# Patient Record
Sex: MALE | Race: WHITE | Employment: OTHER | URBAN - METROPOLITAN AREA
[De-identification: names, ages, dates, MRNs, and addresses within clinical notes are randomized per-mention and may not be internally consistent; named-entity substitution may affect disease eponyms.]

---

## 2019-11-26 ENCOUNTER — OFFICE VISIT (OUTPATIENT)
Dept: ENT CLINIC | Age: 74
End: 2019-11-26
Payer: MEDICARE

## 2019-11-26 VITALS
BODY MASS INDEX: 30.64 KG/M2 | HEART RATE: 88 BPM | TEMPERATURE: 97.2 F | WEIGHT: 226.2 LBS | SYSTOLIC BLOOD PRESSURE: 128 MMHG | DIASTOLIC BLOOD PRESSURE: 77 MMHG | HEIGHT: 72 IN

## 2019-11-26 DIAGNOSIS — H90.3 SENSORINEURAL HEARING LOSS (SNHL) OF BOTH EARS: Primary | ICD-10-CM

## 2019-11-26 DIAGNOSIS — J30.2 SEASONAL ALLERGIES: ICD-10-CM

## 2019-11-26 PROCEDURE — 99203 OFFICE O/P NEW LOW 30 MIN: CPT | Performed by: OTOLARYNGOLOGY

## 2019-11-26 RX ORDER — ZOLPIDEM TARTRATE 12.5 MG/1
12.5 TABLET, FILM COATED, EXTENDED RELEASE ORAL
COMMUNITY

## 2019-11-26 RX ORDER — AZELASTINE HCL 205.5 UG/1
SPRAY NASAL
COMMUNITY
Start: 2019-11-14

## 2019-11-26 RX ORDER — LOSARTAN POTASSIUM AND HYDROCHLOROTHIAZIDE 25; 100 MG/1; MG/1
TABLET ORAL
COMMUNITY
Start: 2019-10-06

## 2019-11-26 RX ORDER — TRIAMCINOLONE ACETONIDE 55 UG/1
1 SPRAY, METERED NASAL
COMMUNITY

## 2019-11-26 RX ORDER — ROSUVASTATIN CALCIUM 10 MG/1
10 TABLET, COATED ORAL
COMMUNITY

## 2019-11-26 RX ORDER — OLOPATADINE HYDROCHLORIDE 1 MG/ML
1 SOLUTION/ DROPS OPHTHALMIC
COMMUNITY

## 2019-11-26 RX ORDER — AMLODIPINE BESYLATE 5 MG/1
TABLET ORAL
COMMUNITY
Start: 2019-11-24

## 2019-11-26 RX ORDER — VITAMIN E 268 MG
CAPSULE ORAL
COMMUNITY

## 2019-11-26 RX ORDER — MELOXICAM 15 MG/1
TABLET ORAL
COMMUNITY
Start: 2019-11-12

## 2019-11-26 ASSESSMENT — ENCOUNTER SYMPTOMS
SHORTNESS OF BREATH: 0
STRIDOR: 0
VOICE CHANGE: 0
COLOR CHANGE: 0
TROUBLE SWALLOWING: 0
CHOKING: 0
APNEA: 0
COUGH: 0
SORE THROAT: 0
EYE PAIN: 0
VOMITING: 0
FACIAL SWELLING: 0
DIARRHEA: 0
CHEST TIGHTNESS: 0
BACK PAIN: 0
EYE DISCHARGE: 0
SINUS PAIN: 0
RHINORRHEA: 0
NAUSEA: 0
SINUS PRESSURE: 0
BLOOD IN STOOL: 0
WHEEZING: 0
CONSTIPATION: 0

## 2022-06-10 ENCOUNTER — HOSPITAL ENCOUNTER (OUTPATIENT)
Dept: PHYSICAL THERAPY | Age: 77
Setting detail: THERAPIES SERIES
Discharge: HOME | End: 2022-06-10
Payer: MEDICARE

## 2022-06-10 PROCEDURE — 97163 PT EVAL HIGH COMPLEX 45 MIN: CPT

## 2022-06-10 NOTE — FLOWSHEET NOTE
[address]  Phone: [phone]   Fax:     [phone]    Physical Therapy Treatment Note/ Progress Report:     Date:  2022  6/10/2022    Patient Name:  Devan Mejia    :  1945  MRN: [id number]  Restrictions/Precautions:    Medical/Treatment Diagnosis Information:  Diagnosis: R shoulder pain, L shoulder pain, B RTC tear, low back pain, L knee pain  Treatment Diagnosis: tear RTC M75.100, R shoulder pain M25.511, L shoulder pain M25.512, lumbar pain M54.5, L knee pain M25.562, post laminectomy syndrome E87.9  Insurance/Certification information:  PT Insurance Information: [employer]  Physician Information:    Dr González Parker and Dr John Hughes of care signed (Y/N):     Date of Patient follow up with Physician:      Progress Report: [x]  Yes  []  No     Date Range for reporting period:  Beginnin/10/2022  Ending: -    Progress report due (10 Rx/or 30 days whichever is less):      Recertification due (POC duration/ or 90 days whichever is less):8/10/2022     Visit # Insurance Allowable Auth Needed   1 [employer] Medicare []Yes    []No     Pain level:  3-8/10   Functional Scale: Lumbar Spine (Foto 62 and RAJESH 15.6), Shoulder (Foto 52, Dash 38.3)   Date Assessed: 6/10/2022    SUBJECTIVE:  See eval    OBJECTIVE: See eval   Observation:    Test measurements:      RESTRICTIONS/PRECAUTIONS: n/a    Exercises/Interventions:     Therapeutic Ex (83514)   Min: sets/sec reps notes   Hip Flexor stretch in standing 30 4    Bridge       Kneeling Alt Arm-Leg      Side lying LB rot      Front Plank      Side planks       Kneeling hip abd/ext      1/2 kneeling down chop      Std band pull down      SL hip abd/clam      Lateral band pull      Lateral band walk      Bosu Lunge      Slide lunge       Ham string stretch      Hip Flex stretch      Glute Stretch      SLS Ball/wall glute      Manual Intervention ([id number]) Min:      DN      Prone PA      GISTM/STM      Lumbar Manip      SI Manip      Hip belt mobs      Hip LA distraction            NMR re-education (32730)   Min:      Mf Activation- re-ed      TrA Re-ed activation      Glute Max re-ed activation      Prone zandra Shane            Therapeutic Activity (88302) Min:                                Therapeutic Exercise and NMR EXR  [x] (09058) Provided verbal/tactile cueing for activities related to strengthening, flexibility, endurance, ROM  for improvements in proximal hip and core control with self care, mobility, lifting and ambulation. [x] (68008) Provided verbal/tactile cueing for activities related to improving balance, coordination, kinesthetic sense, posture, motor skill, proprioception  to assist with core control in self care, mobility, lifting, and ambulation.      Therapeutic Activities:    [] (53498 or 33946) Provided verbal/tactile cueing for activities related to improving balance, coordination, kinesthetic sense, posture, motor skill, proprioception and motor activation to allow for proper function  with self care and ADLs  [] (24559) Provided training and instruction to the patient for proper core and proximal hip recruitment and positioning with ambulation re-education     Home Exercise Program:    [x] (49317) Reviewed/Progressed HEP activities related to strengthening, flexibility, endurance, ROM of core, proximal hip and LE for functional self-care, mobility, lifting and ambulation   [] (83952) Reviewed/Progressed HEP activities related to improving balance, coordination, kinesthetic sense, posture, motor skill, proprioception of core, proximal hip and LE for self care, mobility, lifting, and ambulation      Manual Treatments:  PROM / STM / Oscillations-Mobs:  G-I, II, III, IV (PA's, Inf., Post.)  [x] (64438) Provided manual therapy to mobilize proximal hip and LS spine soft tissue/joints for the purpose of modulating pain, promoting relaxation,  increasing ROM, reducing/eliminating soft tissue swelling/inflammation/restriction, improving soft tissue extensibility and allowing for proper ROM for normal function with self care, mobility, lifting and ambulation. Modalities:       Charges:  Timed Code Treatment Minutes: 5   Total Treatment Minutes: 45     [] EVAL (LOW) 64846 (typically 20 minutes face-to-face)  [] EVAL (MOD) 40009 (typically 30 minutes face-to-face)  [x] EVAL (HIGH) 81136 (typically 45 minutes face-to-face)  [] RE-EVAL     [] AY(18117) x     [] DRY NEEDLE 1 OR 2 MUSCLES  [] NMR (08360) x     [] DRY NEEDLE 3+ MUSCLES  [] Manual (10507) x       [] TA (06262) x     [] Mech Traction (65895)  [] ES(attended) (04133)     [] ES (un) (47978):   [] VASO (65783)  [] Other:    If BWC Please Indicate Time In/Out  CPT Code Time in Time out                                     GOALS:  Patient stated goal: relieve shoulder and buttock/leg pain  [] Progressing: [] Met: [] Not Met: [] Adjusted  Therapist goals for Patient:   Short Term Goals: To be achieved in: 2 weeks  1. Independent in HEP and progression per patient tolerance, in order to prevent re-injury. [] Progressing: [] Met: [] Not Met: [] Adjusted  2. Patient will have a decrease in pain to facilitate improvement in movement, function, and ADLs as indicated by Functional Deficits. [] Progressing: [] Met: [] Not Met: [] Adjusted    Long Term Goals: To be achieved in: 8 weeks  1. Disability index score of 62 or better for Foto (shoulder) and 25 or better for Dash score (Foto). Disability index score of 10 or better for RAJESH (Foto). To assist with reaching prior level of function. [] Progressing: [] Met: [] Not Met: [] Adjusted  2. Patient will demonstrate increased AROM to at least 160 degrees forward elevation of shoulder and good LS mobility, good hip ROM to allow for proper joint functioning as indicated by patients Functional Deficits.    [] Progressing: [] Met: [] Not Met: [] Adjusted  3. Patient will demonstrate an increase in Strength of bilateral shoulder girdle and good proximal hip and core activation to allow for proper functional mobility as indicated by patients Functional Deficits. [] Progressing: [] Met: [] Not Met: [] Adjusted  4. Patient will return to lifting and reaching in functional planes with decreased painful arc of shoulder at  degrees without increased symptoms or restriction. [] Progressing: [] Met: [] Not Met: [] Adjusted  5. Patient will report decreased lateral thigh pain with sitting and standing for greater than 30-60 minutes. (patient specific functional goal)    [] Progressing: [] Met: [] Not Met: [] Adjusted    ASSESSMENT:  See eval    Treatment/Activity Tolerance:  [x] Patient tolerated treatment well [] Patient limited by fatique  [] Patient limited by pain  [] Patient limited by other medical complications  [] Other:     Overall Progression Towards Functional goals/ Treatment Progress Update:  [] Patient is progressing as expected towards functional goals listed. [] Progression is slowed due to complexities/Impairments listed. [] Progression has been slowed due to co-morbidities. [x] Plan just implemented, too soon to assess goals progression <30days   [] Goals require adjustment due to lack of progress  [] Patient is not progressing as expected and requires additional follow up with physician  [] Other:    Prognosis for POC: [x] Good [] Fair  [] Poor    Patient requires continued skilled intervention: [x] Yes  [] No        PLAN: See eval  [] Continue per plan of care [] Alter current plan (see comments)  [x] Plan of care initiated [] Hold pending MD visit [] Discharge    Electronically signed by: Vickey Salazar PT    Note: If patient does not return for scheduled/recommended follow up visits, this note will serve as a discharge from care along with the most recent update on progress.

## 2022-06-10 NOTE — PLAN OF CARE
Todd Coronel  Phone: [phone]   Fax:     [phone]                                                       Physical Therapy Certification    Dear Dr Pablo Sanford and Dr Martin Field  ,    We had the pleasure of evaluating the following patient for physical therapy services at [address] [place] [address]. A summary of our findings can be found in the initial assessment below. This includes our plan of care. If you have any questions or concerns regarding these findings, please do not hesitate to contact me at the office phone number checked above. Thank you for the referral.       Physician Signature:_______________________________Date:__________________  By signing above (or electronic signature), therapists plan is approved by physician            Patient: Osbaldo Johnson   : 1945   MRN: [id number]  Referring Physician:  Dr Pablo Sanford (shoulder order) and Dr Martin Field (lumbar spine order)      Evaluation Date: 2022      Medical Diagnosis Information:  Diagnosis: R shoulder pain, L shoulder pain, B RTC tear, low back pain, L knee pain   Treatment Diagnosis: tear RTC M75.100, R shoulder pain M25.511, L shoulder pain M25.512, lumbar pain M54.5, L knee pain M25.562, post laminectomy syndrome M96.1                                         Insurance information: PT Insurance Information: [employer] Medicare     Precautions/ Contra-indications: none  Latex Allergy:  [x]NO      []YES  Preferred Language for Healthcare:   [x]English       []other:    C-SSRS Triggered by Intake questionnaire (Past 2 wk assessment ):   [x] No, Questionnaire did not trigger screening.   [] Yes, Patient intake triggered C-SSRS Screening      [] C-SSRS Screening completed  [] PCP notified via Epic     SUBJECTIVE: Patient stated complaint:Patient reports that he has been having issues with his back/knee for some time, as well as his shoulders. States that he had discectomy in 1986, but a few years ago started having increased issues. Had B TKR in 2015, but notes that he continues to have IT band type discomfort on L leg. Was treated with PT in 2018 for this with no significant improvement. States that he also had HARPER injection in 2019 and bilateral HARPER in 2020; both without significant improvement in pain. Underwent a genicular block in 2020 as well and did note much improvement with this. Reports that most of his pain is around buttocks and into lateral thigh. States that it occurs mostly with sitting for prolonged periods, as well as with standing. Will be having an EMG. Patient additionally having bilateral shoulder pain which is limiting him with reaching and lifting OH. Pain has been intermittent over the last 8 years, but has been more prominent recently. Patient has had xray images, but no MRI of shoulder to date.      Relevant Medical History: B TKR 2015, discectomy 1986  Functional Scale/Score: Lumbar Spine (Foto 62 and RAJESH 15.6), Shoulder (Foto 52, Dash 38.3)    Pain Scale: 3-8/10  Easing factors: ice, heat, medication  Provocative factors: sitting, standing, sleeping, reaching, OH movements     12/2019 MRI lumbar spine: severe lumbar spondylosis with severe spinal stenosis at L3-4, severe L L3 and moderate right L2, moderate to severe right L4 and moderate right L5 foraminal stenosis    Type: [x]Constant   []Intermittent  []Radiating []Localized []other:     Numbness/Tingling: n/a    Occupation/School: retired    Living Status/Prior Level of Function: Independent with ADLs and IADLs, yard work, carpentry, walking    OBJECTIVE:   Repeated Movements:    ROM  Comments   Lumbar Flex 70 degrees Fingertips to ankle mortise   Lumbar Ext 15 degrees      ROM LEFT RIGHT Comments   Lumbar Side Bend 15 10    Lumbar Rotation limited limited    Quadrant negative negative    Hip Flexion 105 102    Hip Abd 45 45    Hip ER 50 55    Hip IR 10 10    Hip Extension WNL    Bandages/Dressings/Incisions: NA    Neurodynamics: WNL    Orthopedic Special Tests:    Neural dynamic tension testing Normal Abnormal Comments   Slump Test  - Degree of knee flexion:  [x] []    SLR  [x] []    0-30 [] []    30-70 [] []    Femoral nerve (L2-4) [x] []       Normal Abnormal N/A Comments   Fwd Bend-aberrant or innominate mvmt) [x] [] []    Trendelenburg [] [] []    Kemps/Quadrant [] [] []    Vineet Fey [] [] []    CANDIDA/Pieter [] [] []    Hip scour [] [] []    Supine to sit [] [] []    Prone knee bend [] [] []           Hip thrust [] [] []    SI distraction/compression [] [] []    Sacral Spring/thrust [] [] []               [x] Patient history, allergies, meds reviewed. Medical chart reviewed. See intake form. Review Of Systems (ROS):  [x]Performed Review of systems (Integumentary, CardioPulmonary, Neurological) by intake and observation. Intake form has been scanned into medical record. Patient has been instructed to contact their primary care physician regarding ROS issues if not already being addressed at this time.       Co-morbidities/Complexities (which will affect course of rehabilitation):   []None           Arthritic conditions   []Rheumatoid arthritis (M05.9)  [x]Osteoarthritis (M19.91)   Cardiovascular conditions   [x]Hypertension (I10)  []Hyperlipidemia (E78.5)  []Angina pectoris (I20)  []Atherosclerosis (I70)  []CVA Musculoskeletal conditions   []Disc pathology   []Congenital spine pathologies   [x]Prior surgical intervention  []Osteoporosis (M81.8)  []Osteopenia (M85.8)   Endocrine conditions   []Hypothyroid (E03.9)  []Hyperthyroid Gastrointestinal conditions   []Constipation (C16.70)   Metabolic conditions   []Morbid obesity (E66.01)  []Diabetes type 1(E10.65) or 2 (E11.65)   []Neuropathy (G60.9)     Pulmonary conditions   []Asthma (J45)  []Coughing   []COPD (J44.9)   Psychological Disorders  [x]Anxiety (F41.9)  []Depression (F32.9)   []Other:   [x]Other:   Hx of CA        Barriers to/and or personal factors that will affect rehab potential:              []Age  []Sex    []Smoker              []Motivation/Lack of Motivation                        []Co-Morbidities              []Cognitive Function, education/learning barriers              []Environmental, home barriers              []profession/work barriers  []past PT/medical experience  []other:  Justification:     Falls Risk Assessment (30 days):   [x] Falls Risk assessed and no intervention required. [] Falls Risk assessed and Patient requires intervention due to being higher risk   TUG score (>12s at risk):     [] Falls education provided, including:         ASSESSMENT: Patient is a 68 yo female who presents to therapy with reports of B shoulder pain as well as chronic low back pain with symptoms distally along lateral thigh. Upon assessment, patient with decreased lumbar ROM, decreased hip ROM, decreased hip musculature flexibility, decreased hip extensor strength, and decreased bilateral UE ROM and strength. Patient will benefit from skilled PT services to address noted deficits and work to further improve ROM in lumbar spine, hips and UE; as well as improve core stability, shoulder girdle stability and eccentric control of shoulder.      Functional Impairments:     [x]Noted lumbar/proximal hip hypomobility   []Noted lumbosacral and/or generalized hypermobility   [x]Decreased Lumbosacral/hip/LE functional ROM and shoulder ROM   [x]Decreased core/proximal hip strength and neuromuscular control    [x]Decreased LE/UE functional strength    []Abnormal reflexes/sensation/myotomal/dermatomal deficits  []Reduced balance/proprioceptive control    []other:      Functional Activity Limitations (from functional questionnaire and intake)   [x]Reduced ability to tolerate prolonged functional positions   [x]Reduced ability or difficulty with changes of positions or transfers between positions   []Reduced ability to maintain good posture and demonstrate good body mechanics with sitting, bending, and lifting   [x]Reduced ability to sleep   [] Reduced ability or tolerance with driving and/or computer work   [x]Reduced ability to perform lifting, reaching, carrying tasks   [x]Reduced ability to squat   []Reduced ability to forward bend   [x]Reduced ability to ambulate prolonged functional periods/distances/surfaces   []Reduced ability to ascend/descend stairs   [x]other:       Participation Restrictions   []Reduced participation in self care activities   [x]Reduced participation in home management activities   [x]Reduced participation in yard/barn work activities   []Reduced participation in social activities. []Reduced participation in sport/recreational activities. Classification:   [x]Signs/symptoms consistent with Lumbar instability/stabilization subgroup. []Signs/symptoms consistent with Lumbar mobilization/manipulation subgroup, myotomes and dermatomes intact. Meets manipulation criteria. []Signs/symptoms consistent with Lumbar direction specific/centralization subgroup   []Signs/symptoms consistent with Lumbar traction subgroup       []Signs/symptoms consistent with lumbar facet dysfunction   [x]Signs/symptoms consistent with lumbar stenosis type dysfunction   []Signs/symptoms consistent with nerve root involvement including myotome & dermatome dysfunction   []Signs/symptoms consistent with post-surgical status including: decreased ROM, strength and function.    [x]signs/symptoms consistent with pathology which may benefit from Dry needling     [x]other:  Signs/symptoms consistent with RTC tear    Prognosis/Rehab Potential:      []Excellent   [x]Good    []Fair   []Poor    Tolerance of evaluation/treatment:    []Excellent   [x]Good    []Fair   []Poor     Physical Therapy Evaluation Complexity Justification  [x] A history of present problem with:  [] no personal factors and/or comorbidities that impact the plan of care;  [x]1-2 personal factors and/or comorbidities that impact the plan of care  []3 personal factors and/or comorbidities that impact the plan of care  [x] An examination of body systems using standardized tests and measures addressing any of the following: body structures and functions (impairments), activity limitations, and/or participation restrictions;:  [] a total of 1-2 or more elements   [] a total of 3 or more elements   [x] a total of 4 or more elements   [x] A clinical presentation with:  [] stable and/or uncomplicated characteristics   [x] evolving clinical presentation with changing characteristics  [] unstable and unpredictable characteristics;   [x] Clinical decision making of [] low, [] moderate, [x] high complexity using standardized patient assessment instrument and/or measurable assessment of functional outcome. [x] EVAL (LOW) 08792 (typically 20 minutes face-to-face)  [] EVAL (MOD) 62308 (typically 30 minutes face-to-face)  [x] EVAL (HIGH) 39412 (typically 45 minutes face-to-face)  [] RE-EVAL     PLAN: Begin PT focusing on: proximal hip mobilizations, LB mobs, LB core activation, proximal hip activation, and HEP    Frequency/Duration:  2 days per week for 8 Weeks:  Interventions:  [x]  Therapeutic exercise including: strength training, ROM, for LE, Glutes and core   [x]  NMR activation and proprioception for glutes , LE and Core   [x]  Manual therapy as indicated for Hip complex, LE and spine to include: Dry Needling/IASTM, STM, PROM, Gr I-IV mobilizations, manipulation. [x]  Modalities as needed that may include: thermal agents, E-stim, Biofeedback, US, iontophoresis as indicated  [x]  Patient education on joint protection, postural re-education, activity modification, progression of HEP. HEP instruction: hip flexor stretch (see scanned forms)    GOALS:  Patient stated goal: relieve shoulder and buttock/leg pain  [] Progressing: [] Met: [] Not Met: [] Adjusted  Therapist goals for Patient:   Short Term Goals:  To be achieved in: 2 weeks  1. Independent in HEP and progression per patient tolerance, in order to prevent re-injury. [] Progressing: [] Met: [] Not Met: [] Adjusted  2. Patient will have a decrease in pain to facilitate improvement in movement, function, and ADLs as indicated by Functional Deficits. [] Progressing: [] Met: [] Not Met: [] Adjusted    Long Term Goals: To be achieved in: 8 weeks  1. Disability index score of 62 or better for Foto (shoulder) and 25 or better for Dash score (Foto). Disability index score of 10 or better for RAJESH (Foto). To assist with reaching prior level of function. [] Progressing: [] Met: [] Not Met: [] Adjusted  2. Patient will demonstrate increased AROM to at least 160 degrees forward elevation of shoulder and good LS mobility, good hip ROM to allow for proper joint functioning as indicated by patients Functional Deficits. [] Progressing: [] Met: [] Not Met: [] Adjusted  3. Patient will demonstrate an increase in Strength of bilateral shoulder girdle and good proximal hip and core activation to allow for proper functional mobility as indicated by patients Functional Deficits. [] Progressing: [] Met: [] Not Met: [] Adjusted  4. Patient will return to lifting and reaching in functional planes with decreased painful arc of shoulder at  degrees without increased symptoms or restriction. [] Progressing: [] Met: [] Not Met: [] Adjusted  5.  Patient will report decreased lateral thigh pain with sitting and standing for greater than 30-60 minutes. (patient specific functional goal)    [] Progressing: [] Met: [] Not Met: [] Adjusted     Electronically signed by:  Abdifatah Al PT

## 2022-06-14 ENCOUNTER — HOSPITAL ENCOUNTER (OUTPATIENT)
Dept: PHYSICAL THERAPY | Age: 77
Setting detail: THERAPIES SERIES
Discharge: HOME | End: 2022-06-14
Payer: MEDICARE

## 2022-06-14 PROCEDURE — 97110 THERAPEUTIC EXERCISES: CPT

## 2022-06-14 PROCEDURE — 97140 MANUAL THERAPY 1/> REGIONS: CPT

## 2022-06-14 NOTE — FLOWSHEET NOTE
[address]  Phone: [phone]   Fax:     [phone]    Physical Therapy Treatment Note/ Progress Report:     Date:  2022      Patient Name:  Osbaldo Johnson    :  1945  MRN: [id number]  Restrictions/Precautions:    Medical/Treatment Diagnosis Information:  Diagnosis: R shoulder pain, L shoulder pain, B RTC tear, low back pain, L knee pain  Treatment Diagnosis: tear RTC M75.100, R shoulder pain M25.511, L shoulder pain M25.512, lumbar pain M54.5, L knee pain M25.562, post laminectomy syndrome Z98.5  Insurance/Certification information:  PT Insurance Information: [facility] Medicare  Physician Information:    Dr Martin Field and Dr Ale Koenig of care signed (Y/N):     Date of Patient follow up with Physician:      Progress Report: [x]  Yes  []  No     Date Range for reporting period:  Beginnin/10/2022  Ending: -    Progress report due (10 Rx/or 30 days whichever is less): 3/02/8428     Recertification due (POC duration/ or 90 days whichever is less):8/10/2022     Visit # Insurance Allowable Auth Needed   2 [employer] []Yes    []No     Pain level:  3-8/10   Functional Scale: Lumbar Spine (Foto 62 and RAJESH 15.6), Shoulder (Foto 52, Dash 38.3)   Date Assessed: 6/10/2022    SUBJECTIVE:  Patient reports that he has been working on stretch provided. Would like to review it again today.      OBJECTIVE: See eval   Observation:    Test measurements:      RESTRICTIONS/PRECAUTIONS: n/a    Exercises/Interventions:     Therapeutic Ex (13559)   Min: 20 sets/sec reps notes   Hip Flexor stretch in standing 30 4    Glute max stretch (SKTC) 30 4 bilat   Piriformis stretch supine 30 4 bilat   Hip extension POT 10sec 10 bilat   Hip abd at wall 10sec 10 bilat   Bridge       Kneeling Alt Arm-Leg      Side lying LB rot      Front Plank      Side planks       Kneeling hip abd/ext      1/2 kneeling down chop      Std band pull down      SL hip abd/clam      Lateral band pull      Lateral band walk      Bosu Lunge      Slide lunge       Ham string stretch      Hip Flex stretch      Glute Stretch      SLS Ball/wall glute      Manual Intervention (74256) Min: 25 min      Prone L hip ER and anterior hip mob  8 min    Prone L hip ER MET 30 4    Prone R hip anterior hip mob  3 min    Hip belt mobs L hip  3 min    Prone PA lumbar mobs  8 min                      NMR re-education (30462)   Min:      Mf Activation- re-ed      TrA Re-ed activation      Glute Max re-ed activation      Prone zandra Shane            Therapeutic Activity (30828) Min:                                Therapeutic Exercise and NMR EXR  [x] (00131) Provided verbal/tactile cueing for activities related to strengthening, flexibility, endurance, ROM  for improvements in proximal hip and core control with self care, mobility, lifting and ambulation. [x] (29350) Provided verbal/tactile cueing for activities related to improving balance, coordination, kinesthetic sense, posture, motor skill, proprioception  to assist with core control in self care, mobility, lifting, and ambulation.      Therapeutic Activities:    [] (88046 or 60122) Provided verbal/tactile cueing for activities related to improving balance, coordination, kinesthetic sense, posture, motor skill, proprioception and motor activation to allow for proper function  with self care and ADLs  [] (62090) Provided training and instruction to the patient for proper core and proximal hip recruitment and positioning with ambulation re-education     Home Exercise Program:    [x] (45074) Reviewed/Progressed HEP activities related to strengthening, flexibility, endurance, ROM of core, proximal hip and LE for functional self-care, mobility, lifting and ambulation   [] (83928) Reviewed/Progressed HEP activities related to improving balance, coordination, kinesthetic sense, posture, motor skill, proprioception of core, proximal hip and LE for self care, mobility, lifting, and ambulation      Manual Treatments:  PROM / STM / Oscillations-Mobs:  G-I, II, III, IV (PA's, Inf., Post.)  [x] (84584) Provided manual therapy to mobilize proximal hip and LS spine soft tissue/joints for the purpose of modulating pain, promoting relaxation,  increasing ROM, reducing/eliminating soft tissue swelling/inflammation/restriction, improving soft tissue extensibility and allowing for proper ROM for normal function with self care, mobility, lifting and ambulation. Modalities:       Charges:  Timed Code Treatment Minutes: 45   Total Treatment Minutes: 45     [] EVAL (LOW) 02404 (typically 20 minutes face-to-face)  [] EVAL (MOD) 20561 (typically 30 minutes face-to-face)  [] EVAL (HIGH) 73885 (typically 45 minutes face-to-face)  [] RE-EVAL     [x] QN(59496) x  1   [] DRY NEEDLE 1 OR 2 MUSCLES  [] NMR (85759) x     [] DRY NEEDLE 3+ MUSCLES  [x] Manual (17604) x  2     [] TA (97448) x     [] Mech Traction (00454)  [] ES(attended) (82153)     [] ES (un) (38904):   [] VASO (57922)  [] Other:    If [facility] Please Indicate Time In/Out  CPT Code Time in Time out                                     GOALS:  Patient stated goal: relieve shoulder and buttock/leg pain  [] Progressing: [] Met: [] Not Met: [] Adjusted  Therapist goals for Patient:   Short Term Goals: To be achieved in: 2 weeks  1. Independent in HEP and progression per patient tolerance, in order to prevent re-injury. [] Progressing: [] Met: [] Not Met: [] Adjusted  2. Patient will have a decrease in pain to facilitate improvement in movement, function, and ADLs as indicated by Functional Deficits. [] Progressing: [] Met: [] Not Met: [] Adjusted    Long Term Goals: To be achieved in: 8 weeks  1. Disability index score of 62 or better for Foto (shoulder) and 25 or better for Dash score (Foto). Disability index score of 10 or better for RAJESH (Foto). To assist with reaching prior level of function. [] Progressing: [] Met: [] Not Met: [] Adjusted  2. Patient will demonstrate increased AROM to at least 160 degrees forward elevation of shoulder and good LS mobility, good hip ROM to allow for proper joint functioning as indicated by patients Functional Deficits. [] Progressing: [] Met: [] Not Met: [] Adjusted  3. Patient will demonstrate an increase in Strength of bilateral shoulder girdle and good proximal hip and core activation to allow for proper functional mobility as indicated by patients Functional Deficits. [] Progressing: [] Met: [] Not Met: [] Adjusted  4. Patient will return to lifting and reaching in functional planes with decreased painful arc of shoulder at  degrees without increased symptoms or restriction. [] Progressing: [] Met: [] Not Met: [] Adjusted  5. Patient will report decreased lateral thigh pain with sitting and standing for greater than 30-60 minutes. (patient specific functional goal)    [] Progressing: [] Met: [] Not Met: [] Adjusted    ASSESSMENT:  See eval    Treatment/Activity Tolerance:  [x] Patient tolerated treatment well [] Patient limited by fatique  [] Patient limited by pain  [] Patient limited by other medical complications  [] Other:     Overall Progression Towards Functional goals/ Treatment Progress Update:  [] Patient is progressing as expected towards functional goals listed. [] Progression is slowed due to complexities/Impairments listed. [] Progression has been slowed due to co-morbidities.   [x] Plan just implemented, too soon to assess goals progression <30days   [] Goals require adjustment due to lack of progress  [] Patient is not progressing as expected and requires additional follow up with physician  [] Other:    Prognosis for POC: [x] Good [] Fair  [] Poor    Patient requires continued skilled intervention: [x] Yes  [] No        PLAN: See eval  [] Continue per plan of care [] Alter current plan (see comments)  [x] Plan of care initiated [] Hold pending MD visit [] Discharge    Electronically signed by: Candice Darden PT    Note: If patient does not return for scheduled/recommended follow up visits, this note will serve as a discharge from care along with the most recent update on progress.

## 2022-06-16 ENCOUNTER — HOSPITAL ENCOUNTER (OUTPATIENT)
Dept: PHYSICAL THERAPY | Age: 77
Setting detail: THERAPIES SERIES
Discharge: HOME | End: 2022-06-16
Payer: MEDICARE

## 2022-06-16 PROCEDURE — 97110 THERAPEUTIC EXERCISES: CPT

## 2022-06-16 PROCEDURE — 97140 MANUAL THERAPY 1/> REGIONS: CPT

## 2022-06-16 NOTE — FLOWSHEET NOTE
[address]  Phone: [phone]   Fax:     [phone]    Physical Therapy Treatment Note/ Progress Report:     Date:  2022      Patient Name:  Jose Douglas    :  1945  MRN: [id number]  Restrictions/Precautions:    Medical/Treatment Diagnosis Information:  Diagnosis: R shoulder pain, L shoulder pain, B RTC tear, low back pain, L knee pain  Treatment Diagnosis: tear RTC M75.100, R shoulder pain M25.511, L shoulder pain M25.512, lumbar pain M54.5, L knee pain M25.562, post laminectomy syndrome V25.6  Insurance/Certification information:  PT Insurance Information: [employer]  Physician Information:    Dr Perkins and Dr Washington Thapa of care signed (Y/N):     Date of Patient follow up with Physician:      Progress Report: [x]  Yes  []  No     Date Range for reporting period:  Beginnin/10/2022  Ending: -    Progress report due (10 Rx/or 30 days whichever is less):      Recertification due (POC duration/ or 90 days whichever is less):8/10/2022     Visit # Insurance Allowable Auth Needed   3 [employer] []Yes    []No     Pain level:  3-8/10   Functional Scale: Lumbar Spine (Foto 62 and RAJESH 15.6), Shoulder (Foto 52, Dash 38.3)   Date Assessed: 6/10/2022    SUBJECTIVE:  Patient reports that he has been working on home stretch provided for legs. Ready to work on shoulders today.       OBJECTIVE: See eval   Observation:    Test measurements:      RESTRICTIONS/PRECAUTIONS: n/a    Exercises/Interventions:     Therapeutic Ex (64455)   Min: 25 sets/sec reps notes   Hip Flexor stretch in standing 30 4    Glute max stretch (SKTC) 30 4 bilat   Piriformis stretch supine 30 4 bilat   Hip extension POT 10sec 10 bilat   Hip abd at wall 10sec 10 bilat   Bridge       Kneeling Alt Arm-Leg      Side lying LB rot      Front Plank      Side planks       Kneeling hip abd/ext            Shoulder press/flex/ER 1 15 Brown wand   SA punch 1 15 2 lb, bilat   Standing band row 1 15 black   Standing band sh extension 1 15 black   Side stepping ER with band 1 15 Bilat, black                                 Manual Intervention (17671) Min: 20 min      Prone L hip ER and anterior hip mob  8 min    Prone L hip ER MET 30 4    Prone R hip anterior hip mob  3 min    Hip belt mobs L hip  3 min    Prone PA lumbar mobs  8 min          Shoulder posterior and inferior mobs  20 bilat                           NMR re-education (69756)   Min:      Mf Activation- re-ed      TrA Re-ed activation      Glute Max re-ed activation      Prone froggers      [place]            Therapeutic Activity (45564) Min:                                Therapeutic Exercise and NMR EXR  [x] (34263) Provided verbal/tactile cueing for activities related to strengthening, flexibility, endurance, ROM  for improvements in proximal hip and core control with self care, mobility, lifting and ambulation. [x] (61274) Provided verbal/tactile cueing for activities related to improving balance, coordination, kinesthetic sense, posture, motor skill, proprioception  to assist with core control in self care, mobility, lifting, and ambulation.      Therapeutic Activities:    [] (41763 or 91173) Provided verbal/tactile cueing for activities related to improving balance, coordination, kinesthetic sense, posture, motor skill, proprioception and motor activation to allow for proper function  with self care and ADLs  [] (36284) Provided training and instruction to the patient for proper core and proximal hip recruitment and positioning with ambulation re-education     Home Exercise Program:    [x] (10962) Reviewed/Progressed HEP activities related to strengthening, flexibility, endurance, ROM of core, proximal hip and LE for functional self-care, mobility, lifting and ambulation   [] (12792) Reviewed/Progressed HEP activities related to improving balance, coordination, kinesthetic sense, posture, motor skill, proprioception of core, proximal hip and LE for self care, mobility, lifting, and ambulation      Manual Treatments:  PROM / STM / Oscillations-Mobs:  G-I, II, III, IV (PA's, Inf., Post.)  [x] (56184) Provided manual therapy to mobilize proximal hip and LS spine soft tissue/joints for the purpose of modulating pain, promoting relaxation,  increasing ROM, reducing/eliminating soft tissue swelling/inflammation/restriction, improving soft tissue extensibility and allowing for proper ROM for normal function with self care, mobility, lifting and ambulation. Modalities:       Charges:  Timed Code Treatment Minutes: 45   Total Treatment Minutes: 45     [] EVAL (LOW) 02221 (typically 20 minutes face-to-face)  [] EVAL (MOD) 01793 (typically 30 minutes face-to-face)  [] EVAL (HIGH) 99930 (typically 45 minutes face-to-face)  [] RE-EVAL     [x] CC(42879) x  2   [] DRY NEEDLE 1 OR 2 MUSCLES  [] NMR (56478) x     [] DRY NEEDLE 3+ MUSCLES  [x] Manual (31511) x  1     [] TA (95129) x     [] Mech Traction (42921)  [] ES(attended) (73136)     [] ES (un) (58815):   [] VASO (57333)  [] Other:    If [facility] Please Indicate Time In/Out  CPT Code Time in Time out                                     GOALS:  Patient stated goal: relieve shoulder and buttock/leg pain  [] Progressing: [] Met: [] Not Met: [] Adjusted  Therapist goals for Patient:   Short Term Goals: To be achieved in: 2 weeks  1. Independent in HEP and progression per patient tolerance, in order to prevent re-injury. [] Progressing: [] Met: [] Not Met: [] Adjusted  2. Patient will have a decrease in pain to facilitate improvement in movement, function, and ADLs as indicated by Functional Deficits. [] Progressing: [] Met: [] Not Met: [] Adjusted    Long Term Goals: To be achieved in: 8 weeks  1. Disability index score of 62 or better for Foto (shoulder) and 25 or better for Dash score (Foto). Disability index score of 10 or better for RAJESH (Foto).  To assist with reaching prior level of function. [] Progressing: [] Met: [] Not Met: [] Adjusted  2. Patient will demonstrate increased AROM to at least 160 degrees forward elevation of shoulder and good LS mobility, good hip ROM to allow for proper joint functioning as indicated by patients Functional Deficits. [] Progressing: [] Met: [] Not Met: [] Adjusted  3. Patient will demonstrate an increase in Strength of bilateral shoulder girdle and good proximal hip and core activation to allow for proper functional mobility as indicated by patients Functional Deficits. [] Progressing: [] Met: [] Not Met: [] Adjusted  4. Patient will return to lifting and reaching in functional planes with decreased painful arc of shoulder at  degrees without increased symptoms or restriction. [] Progressing: [] Met: [] Not Met: [] Adjusted  5. Patient will report decreased lateral thigh pain with sitting and standing for greater than 30-60 minutes. (patient specific functional goal)    [] Progressing: [] Met: [] Not Met: [] Adjusted    ASSESSMENT:  Patient with restricted posterior and inferior mobility in bilat shoulder. Weak in periscapular musculature and RTC. Challenged with reactive ER sidestepping. Provided band for HEP. Treatment/Activity Tolerance:  [x] Patient tolerated treatment well [] Patient limited by fatique  [] Patient limited by pain  [] Patient limited by other medical complications  [] Other:     Overall Progression Towards Functional goals/ Treatment Progress Update:  [] Patient is progressing as expected towards functional goals listed. [] Progression is slowed due to complexities/Impairments listed. [] Progression has been slowed due to co-morbidities.   [x] Plan just implemented, too soon to assess goals progression <30days   [] Goals require adjustment due to lack of progress  [] Patient is not progressing as expected and requires additional follow up with physician  [] Other:    Prognosis for POC: [x] Good [] Fair  [] Poor    Patient requires continued skilled intervention: [x] Yes  [] No        PLAN: See eval  [] Continue per plan of care [] Alter current plan (see comments)  [x] Plan of care initiated [] Hold pending MD visit [] Discharge    Electronically signed by: Hector Morris PT    Note: If patient does not return for scheduled/recommended follow up visits, this note will serve as a discharge from care along with the most recent update on progress.

## 2022-06-21 ENCOUNTER — HOSPITAL ENCOUNTER (OUTPATIENT)
Dept: PHYSICAL THERAPY | Age: 77
Setting detail: THERAPIES SERIES
Discharge: HOME | End: 2022-06-21
Payer: MEDICARE

## 2022-06-21 PROCEDURE — 97110 THERAPEUTIC EXERCISES: CPT

## 2022-06-21 PROCEDURE — 97140 MANUAL THERAPY 1/> REGIONS: CPT

## 2022-06-21 NOTE — FLOWSHEET NOTE
[address]  Phone: [phone]   Fax:     [phone]    Physical Therapy Treatment Note/ Progress Report:     Date:  2022      Patient Name:  Enedelia Madera    :  1945  MRN: [id number]  Restrictions/Precautions:    Medical/Treatment Diagnosis Information:  Diagnosis: R shoulder pain, L shoulder pain, B RTC tear, low back pain, L knee pain  Treatment Diagnosis: tear RTC M75.100, R shoulder pain M25.511, L shoulder pain M25.512, lumbar pain M54.5, L knee pain M25.562, post laminectomy syndrome H61.0  Insurance/Certification information:  PT Insurance Information: [employer]  Physician Information:    Dr Jackie Blount and Dr Andres Limb of care signed (Y/N):     Date of Patient follow up with Physician:      Progress Report: [x]  Yes  []  No     Date Range for reporting period:  Beginnin/10/2022  Ending: -    Progress report due (10 Rx/or 30 days whichever is less):      Recertification due (POC duration/ or 90 days whichever is less):8/10/2022     Visit # Insurance Allowable Auth Needed   4 [employer] Medicare []Yes    []No     Pain level:  3-8/10   Functional Scale: Lumbar Spine (Foto 62 and RAJESH 15.6), Shoulder (Foto 52, Dash 38.3)   Date Assessed: 6/10/2022    SUBJECTIVE:  Patient reports that he has been having less N/T into lower leg (from knee to calf). Notes hips are feeling more loose. Still has times when he has pain along IT band area. Uses ball to roll area. A lot depends on what he has done throughout the day. Has a few questions regarding home exercises to ensure he is doing them properly.        OBJECTIVE: See eval   Observation:    Test measurements:      RESTRICTIONS/PRECAUTIONS: n/a    Exercises/Interventions:     Therapeutic Ex (53342)   Min: 10 sets/sec reps notes   Hip Flexor stretch in standing 30 4    Glute max stretch (SKTC) 30 4 bilat   Piriformis stretch supine 30 4 bilat Hip extension POT 10sec 10 bilat   Hip abd at wall 10sec 10 bilat   Bridge       Kneeling Alt Arm-Leg      Side lying LB rot 10 10    Review of HEP  5 min    Side planks       Kneeling hip abd/ext            Shoulder press/flex/ER 1 15 Brown wand   SA punch 1 15 2 lb, bilat   Standing band row 1 15 black   Standing band sh extension 1 15 black   Side stepping ER with band 1 15 Bilat, black                                 Manual Intervention (39273) Min: 30 min      Prone L hip ER and anterior hip mob  8 min    Prone L hip ER MET 0     Prone R hip anterior hip mob  0 min    Hip belt mobs L hip  4 min    Prone PA lumbar mobs  8 min    STM IT band + IT band mobs  10 min                      Shoulder posterior and inferior mobs  20 bilat                           NMR re-education (69058)   Min:      Mf Activation- re-ed      TrA Re-ed activation      Glute Max re-ed activation      Prone froggers      [place]            Therapeutic Activity (13377) Min:                                Therapeutic Exercise and NMR EXR  [x] (30096) Provided verbal/tactile cueing for activities related to strengthening, flexibility, endurance, ROM  for improvements in proximal hip and core control with self care, mobility, lifting and ambulation. [x] (30339) Provided verbal/tactile cueing for activities related to improving balance, coordination, kinesthetic sense, posture, motor skill, proprioception  to assist with core control in self care, mobility, lifting, and ambulation.      Therapeutic Activities:    [] (96010 or 70823) Provided verbal/tactile cueing for activities related to improving balance, coordination, kinesthetic sense, posture, motor skill, proprioception and motor activation to allow for proper function  with self care and ADLs  [] (39740) Provided training and instruction to the patient for proper core and proximal hip recruitment and positioning with ambulation re-education     Home Exercise Program:    [x] (33903) Reviewed/Progressed HEP activities related to strengthening, flexibility, endurance, ROM of core, proximal hip and LE for functional self-care, mobility, lifting and ambulation   [] (23898) Reviewed/Progressed HEP activities related to improving balance, coordination, kinesthetic sense, posture, motor skill, proprioception of core, proximal hip and LE for self care, mobility, lifting, and ambulation      Manual Treatments:  PROM / STM / Oscillations-Mobs:  G-I, II, III, IV (PA's, Inf., Post.)  [x] (93145) Provided manual therapy to mobilize proximal hip and LS spine soft tissue/joints for the purpose of modulating pain, promoting relaxation,  increasing ROM, reducing/eliminating soft tissue swelling/inflammation/restriction, improving soft tissue extensibility and allowing for proper ROM for normal function with self care, mobility, lifting and ambulation. Modalities:       Charges:  Timed Code Treatment Minutes: 40   Total Treatment Minutes: 41     [] EVAL (LOW) 53253 (typically 20 minutes face-to-face)  [] EVAL (MOD) 56198 (typically 30 minutes face-to-face)  [] EVAL (HIGH) 93558 (typically 45 minutes face-to-face)  [] RE-EVAL     [x] CI(40315) x  1   [] DRY NEEDLE 1 OR 2 MUSCLES  [] NMR (99476) x     [] DRY NEEDLE 3+ MUSCLES  [x] Manual (89417) x  2     [] TA (90930) x     [] Mech Traction (86987)  [] ES(attended) (20478)     [] ES (un) (01081):   [] VASO (92901)  [] Other:    If [facility] Please Indicate Time In/Out  CPT Code Time in Time out                                     GOALS:  Patient stated goal: relieve shoulder and buttock/leg pain  [] Progressing: [] Met: [] Not Met: [] Adjusted  Therapist goals for Patient:   Short Term Goals: To be achieved in: 2 weeks  1. Independent in HEP and progression per patient tolerance, in order to prevent re-injury. [] Progressing: [] Met: [] Not Met: [] Adjusted  2.  Patient will have a decrease in pain to facilitate improvement in movement, function, and ADLs as indicated by Functional Deficits. [] Progressing: [] Met: [] Not Met: [] Adjusted    Long Term Goals: To be achieved in: 8 weeks  1. Disability index score of 62 or better for Foto (shoulder) and 25 or better for Dash score (Foto). Disability index score of 10 or better for RJAESH (Foto). To assist with reaching prior level of function. [] Progressing: [] Met: [] Not Met: [] Adjusted  2. Patient will demonstrate increased AROM to at least 160 degrees forward elevation of shoulder and good LS mobility, good hip ROM to allow for proper joint functioning as indicated by patients Functional Deficits. [] Progressing: [] Met: [] Not Met: [] Adjusted  3. Patient will demonstrate an increase in Strength of bilateral shoulder girdle and good proximal hip and core activation to allow for proper functional mobility as indicated by patients Functional Deficits. [] Progressing: [] Met: [] Not Met: [] Adjusted  4. Patient will return to lifting and reaching in functional planes with decreased painful arc of shoulder at  degrees without increased symptoms or restriction. [] Progressing: [] Met: [] Not Met: [] Adjusted  5. Patient will report decreased lateral thigh pain with sitting and standing for greater than 30-60 minutes. (patient specific functional goal)    [] Progressing: [] Met: [] Not Met: [] Adjusted    ASSESSMENT:  Patient with improving hip ER bilaterally. L hip still restricted in IR. Good improvement in hip mobility with mobilization of hip. Patient tight in L L5/S!, good improvement of joint mobility with PA and sidelying opening of l/spine. Added self stretch for lumbar rotation to HEP. Patient tolerated all stretching well. IT band restricted along mid to distal portion, as well as at attachment to vastus lateralis and lateral hamstring. IT band mobilization to assist in flexibility and extensibility of tissue. Patient fibular head WNL. Updated HEP. Discussed prior HEP adjustments and modifications. Treatment/Activity Tolerance:  [x] Patient tolerated treatment well [] Patient limited by fatique  [] Patient limited by pain  [] Patient limited by other medical complications  [] Other:     Overall Progression Towards Functional goals/ Treatment Progress Update:  [] Patient is progressing as expected towards functional goals listed. [] Progression is slowed due to complexities/Impairments listed. [] Progression has been slowed due to co-morbidities. [x] Plan just implemented, too soon to assess goals progression <30days   [] Goals require adjustment due to lack of progress  [] Patient is not progressing as expected and requires additional follow up with physician  [] Other:    Prognosis for POC: [x] Good [] Fair  [] Poor    Patient requires continued skilled intervention: [x] Yes  [] No        PLAN: See eval  [] Continue per plan of care [] Alter current plan (see comments)  [x] Plan of care initiated [] Hold pending MD visit [] Discharge    Electronically signed by: Kera Flores, PT    Note: If patient does not return for scheduled/recommended follow up visits, this note will serve as a discharge from care along with the most recent update on progress.

## 2022-06-23 ENCOUNTER — HOSPITAL ENCOUNTER (OUTPATIENT)
Dept: PHYSICAL THERAPY | Age: 77
Setting detail: THERAPIES SERIES
Discharge: HOME | End: 2022-06-23
Payer: MEDICARE

## 2022-06-23 PROCEDURE — 97140 MANUAL THERAPY 1/> REGIONS: CPT

## 2022-06-23 PROCEDURE — 97110 THERAPEUTIC EXERCISES: CPT

## 2022-06-23 NOTE — FLOWSHEET NOTE
[address]  Phone: [phone]   Fax:     [phone]    Physical Therapy Treatment Note/ Progress Report:     Date:  2022      Patient Name:  Dutch Claros    :  1945  MRN: [id number]  Restrictions/Precautions:    Medical/Treatment Diagnosis Information:  Diagnosis: R shoulder pain, L shoulder pain, B RTC tear, low back pain, L knee pain  Treatment Diagnosis: tear RTC M75.100, R shoulder pain M25.511, L shoulder pain M25.512, lumbar pain M54.5, L knee pain M25.562, post laminectomy syndrome P29.3  Insurance/Certification information:  PT Insurance Information: [employer]  Physician Information:    Dr Josh Connor and Dr Suly Cruz of care signed (Y/N):     Date of Patient follow up with Physician:      Progress Report: [x]  Yes  []  No     Date Range for reporting period:  Beginnin/10/2022  Ending: -    Progress report due (10 Rx/or 30 days whichever is less):      Recertification due (POC duration/ or 90 days whichever is less):8/10/2022     Visit # Insurance Allowable Auth Needed   5 [employer] Medicare []Yes    []No     Pain level:  3-8/10   Functional Scale: Lumbar Spine (Foto 62 and RAJESH 15.6), Shoulder (Foto 52, Dash 38.3)   Date Assessed: 6/10/2022    SUBJECTIVE:  Patient reports that he has been working on home exercises for shoulders. States that he is doing better with form for exercises and is more aware of improving position. Tender in bicep areas. .       OBJECTIVE: tender to palpate in bilateral bicep and bilat posterior shoulders.    Observation:    Test measurements:      RESTRICTIONS/PRECAUTIONS: n/a    Exercises/Interventions:     Therapeutic Ex (33725)   Min: 25 sets/sec reps notes   Hip Flexor stretch in standing 30 4    Glute max stretch (SKTC) 30 4 bilat   Piriformis stretch supine 30 4 bilat   Hip extension POT 10sec 10 bilat   Hip abd at wall 10sec 10 bilat   Bridge Kneeling Alt Arm-Leg      Side lying LB rot 10 10    Review of HEP  5 min    Side planks       Kneeling hip abd/ext            Shoulder press/flex/ER 1 15 Brown wand   SA punch 1 15 2 lb, bilat   Standing band row 1 15 black   Standing band sh extension 1 15 black   Side stepping ER with band 1 15 Bilat, black   Side stepping IR with band with eccentric control to ER 1 15 red   GH depression 10 10 [place] ball   Shoulder ER isometrics at wall 10 10                Manual Intervention (37593) Min: 20 min      Prone L hip ER and anterior hip mob  0    Prone L hip ER MET 0     Prone R hip anterior hip mob  0 min    Hip belt mobs L hip  0 min    Prone PA lumbar mobs  0 min    STM IT band + IT band mobs  0 min                      Shoulder posterior and inferior mobs, STM posterior shoulder and biceps  20 bilat                           NMR re-education (88023)   Min:      Mf Activation- re-ed      TrA Re-ed activation      Glute Max re-ed activation      Prone zandra [place]al[place]            Therapeutic Activity (40015) Min:                                Therapeutic Exercise and NMR EXR  [x] (54149) Provided verbal/tactile cueing for activities related to strengthening, flexibility, endurance, ROM  for improvements in proximal hip and core control with self care, mobility, lifting and ambulation. [x] (13831) Provided verbal/tactile cueing for activities related to improving balance, coordination, kinesthetic sense, posture, motor skill, proprioception  to assist with core control in self care, mobility, lifting, and ambulation.      Therapeutic Activities:    [] (09754 or 63287) Provided verbal/tactile cueing for activities related to improving balance, coordination, kinesthetic sense, posture, motor skill, proprioception and motor activation to allow for proper function  with self care and ADLs  [] (62938) Provided training and instruction to the patient for proper core and proximal hip recruitment and positioning with ambulation re-education     Home Exercise Program:    [x] (83443) Reviewed/Progressed HEP activities related to strengthening, flexibility, endurance, ROM of core, proximal hip and LE for functional self-care, mobility, lifting and ambulation   [] (48592) Reviewed/Progressed HEP activities related to improving balance, coordination, kinesthetic sense, posture, motor skill, proprioception of core, proximal hip and LE for self care, mobility, lifting, and ambulation      Manual Treatments:  PROM / STM / Oscillations-Mobs:  G-I, II, III, IV (PA's, Inf., Post.)  [x] (15585) Provided manual therapy to mobilize proximal hip and LS spine soft tissue/joints for the purpose of modulating pain, promoting relaxation,  increasing ROM, reducing/eliminating soft tissue swelling/inflammation/restriction, improving soft tissue extensibility and allowing for proper ROM for normal function with self care, mobility, lifting and ambulation. Modalities:       Charges:  Timed Code Treatment Minutes: 45   Total Treatment Minutes: 45     [] EVAL (LOW) 18732 (typically 20 minutes face-to-face)  [] EVAL (MOD) 16292 (typically 30 minutes face-to-face)  [] EVAL (HIGH) 22351 (typically 45 minutes face-to-face)  [] RE-EVAL     [x] YZ(18392) x  2   [] DRY NEEDLE 1 OR 2 MUSCLES  [] NMR (67671) x     [] DRY NEEDLE 3+ MUSCLES  [x] Manual (01327) x  1     [] TA (49759) x     [] Mech Traction (77289)  [] ES(attended) (03919)     [] ES (un) (36979):   [] VASO (58939)  [] Other:    If [facility] Please Indicate Time In/Out  CPT Code Time in Time out                                     GOALS:  Patient stated goal: relieve shoulder and buttock/leg pain  [] Progressing: [] Met: [] Not Met: [] Adjusted  Therapist goals for Patient:   Short Term Goals: To be achieved in: 2 weeks  1. Independent in HEP and progression per patient tolerance, in order to prevent re-injury. [] Progressing: [] Met: [] Not Met: [] Adjusted  2.  Patient will have a decrease in pain to facilitate improvement in movement, function, and ADLs as indicated by Functional Deficits. [] Progressing: [] Met: [] Not Met: [] Adjusted    Long Term Goals: To be achieved in: 8 weeks  1. Disability index score of 62 or better for Foto (shoulder) and 25 or better for Dash score (Foto). Disability index score of 10 or better for RAJESH (Foto). To assist with reaching prior level of function. [] Progressing: [] Met: [] Not Met: [] Adjusted  2. Patient will demonstrate increased AROM to at least 160 degrees forward elevation of shoulder and good LS mobility, good hip ROM to allow for proper joint functioning as indicated by patients Functional Deficits. [] Progressing: [] Met: [] Not Met: [] Adjusted  3. Patient will demonstrate an increase in Strength of bilateral shoulder girdle and good proximal hip and core activation to allow for proper functional mobility as indicated by patients Functional Deficits. [] Progressing: [] Met: [] Not Met: [] Adjusted  4. Patient will return to lifting and reaching in functional planes with decreased painful arc of shoulder at  degrees without increased symptoms or restriction. [] Progressing: [] Met: [] Not Met: [] Adjusted  5. Patient will report decreased lateral thigh pain with sitting and standing for greater than 30-60 minutes. (patient specific functional goal)    [] Progressing: [] Met: [] Not Met: [] Adjusted    ASSESSMENT:  Patient with increased soft tissue restriction in bicep tendon bilaterally. Also tender in posterior shoulder. Less crepitus with improved scapular depression. Added further eccentric and isometrics for RTC. Patient tolerated scapular depression exercises well with good control. Needs further improvement with control during depression and elevation of shoulder.      Treatment/Activity Tolerance:  [x] Patient tolerated treatment well [] Patient limited by fatique  [] Patient limited by pain  [] Patient limited by other medical complications  [] Other:     Overall Progression Towards Functional goals/ Treatment Progress Update:  [] Patient is progressing as expected towards functional goals listed. [] Progression is slowed due to complexities/Impairments listed. [] Progression has been slowed due to co-morbidities. [x] Plan just implemented, too soon to assess goals progression <30days   [] Goals require adjustment due to lack of progress  [] Patient is not progressing as expected and requires additional follow up with physician  [] Other:    Prognosis for POC: [x] Good [] Fair  [] Poor    Patient requires continued skilled intervention: [x] Yes  [] No        PLAN: See eval  [] Continue per plan of care [] Alter current plan (see comments)  [x] Plan of care initiated [] Hold pending MD visit [] Discharge    Electronically signed by: Woodrow Fabian PT    Note: If patient does not return for scheduled/recommended follow up visits, this note will serve as a discharge from care along with the most recent update on progress.

## 2022-06-28 ENCOUNTER — HOSPITAL ENCOUNTER (OUTPATIENT)
Dept: PHYSICAL THERAPY | Age: 77
Setting detail: THERAPIES SERIES
Discharge: HOME | End: 2022-06-28
Payer: MEDICARE

## 2022-06-28 PROCEDURE — 97110 THERAPEUTIC EXERCISES: CPT

## 2022-06-28 PROCEDURE — 97140 MANUAL THERAPY 1/> REGIONS: CPT

## 2022-06-30 ENCOUNTER — HOSPITAL ENCOUNTER (OUTPATIENT)
Dept: PHYSICAL THERAPY | Age: 77
Setting detail: THERAPIES SERIES
Discharge: HOME | End: 2022-06-30
Payer: MEDICARE

## 2022-06-30 PROCEDURE — 97110 THERAPEUTIC EXERCISES: CPT

## 2022-06-30 PROCEDURE — 97140 MANUAL THERAPY 1/> REGIONS: CPT

## 2022-06-30 NOTE — FLOWSHEET NOTE
bilat   Bridge  2 10    Kneeling Alt Arm-Leg- EOT 2 10    Side lying LB rot 10 10 bilat   Review of HEP  5 min    Hooklying TA + alt march to 90/90 1 10    Kneeling hip abd/ext            Prone sh ext with scap retraction 1 10 bilat   Prone row with scap retraction 1 10 bilat   Prone scap retraction with lift 1 10 bilat   SL punch 1 10 bilat   SL ER 1 10 bilat   Shoulder press/flex/ER 1 15 Brown wand   SA punch 1 15 2 lb, bilat   Standing band row 1 15 black   Standing band sh extension 1 15 black   Side stepping ER with band 1 15 Bilat, black   Side stepping IR with band with eccentric control to ER 1 15 red   GH depression 10 10 [place] ball   Shoulder ER isometrics at wall 10 10                Manual Intervention (35732) Min: 20 min      Prone hip ER and anterior hip mob  4    Prone L hip ER MET 0     Prone R hip anterior hip mob  4 min    Hip belt mobs L hip  4 min    Prone PA lumbar mobs  8 min    STM IT band + IT band mobs  0 min                      Shoulder posterior and inferior mobs, STM posterior shoulder and biceps  20 bilat                           NMR re-education (52112)   Min:      Mf Activation- re-ed      TrA Re-ed activation      Glute Max re-ed activation      Prone froggers      [place]            Therapeutic Activity (50728) Min:                                Therapeutic Exercise and NMR EXR  [x] (38440) Provided verbal/tactile cueing for activities related to strengthening, flexibility, endurance, ROM  for improvements in proximal hip and core control with self care, mobility, lifting and ambulation. [x] (33886) Provided verbal/tactile cueing for activities related to improving balance, coordination, kinesthetic sense, posture, motor skill, proprioception  to assist with core control in self care, mobility, lifting, and ambulation.      Therapeutic Activities:    [] (39868 or 64642) Provided verbal/tactile cueing for activities related to improving balance, coordination, kinesthetic sense, [place]lin  [] Patient limited by pain  [] Patient limited by other medical complications  [] Other:     Overall Progression Towards Functional goals/ Treatment Progress Update:  [] Patient is progressing as expected towards functional goals listed. [] Progression is slowed due to complexities/Impairments listed. [] Progression has been slowed due to co-morbidities. [x] Plan just implemented, too soon to assess goals progression <30days   [] Goals require adjustment due to lack of progress  [] Patient is not progressing as expected and requires additional follow up with physician  [] Other:    Prognosis for POC: [x] Good [] Fair  [] Poor    Patient requires continued skilled intervention: [x] Yes  [] No        PLAN: See eval  [] Continue per plan of care [] Alter current plan (see comments)  [x] Plan of care initiated [] Hold pending MD visit [] Discharge    Electronically signed by: Bimal Lei PT    Note: If patient does not return for scheduled/recommended follow up visits, this note will serve as a discharge from care along with the most recent update on progress.

## 2022-07-05 ENCOUNTER — APPOINTMENT (OUTPATIENT)
Dept: PHYSICAL THERAPY | Age: 77
End: 2022-07-05
Payer: MEDICARE

## 2022-07-19 ENCOUNTER — HOSPITAL ENCOUNTER (OUTPATIENT)
Dept: PHYSICAL THERAPY | Age: 77
Setting detail: THERAPIES SERIES
Discharge: HOME | End: 2022-07-19
Payer: MEDICARE

## 2022-07-19 PROCEDURE — 97140 MANUAL THERAPY 1/> REGIONS: CPT

## 2022-07-19 PROCEDURE — 97110 THERAPEUTIC EXERCISES: CPT

## 2022-07-19 NOTE — FLOWSHEET NOTE
[address]  Phone: [phone]   Fax:     [phone]    Physical Therapy Treatment Note/ Progress Report:     Date:  2022      Patient Name:  Theron Blakely    :  1945  MRN: [id number]  Restrictions/Precautions:    Medical/Treatment Diagnosis Information:  Diagnosis: R shoulder pain, L shoulder pain, B RTC tear, low back pain, L knee pain  Treatment Diagnosis: tear RTC M75.100, R shoulder pain M25.511, L shoulder pain M25.512, lumbar pain M54.5, L knee pain M25.562, post laminectomy syndrome R86.6  Insurance/Certification information:  PT Insurance Information: [address] [place] [address]  Medicare  Physician Information:    Dr Marcela Barger and Dr Lloyd Cruz of care signed (Y/N):     Date of Patient follow up with Physician:      Progress Report: [x]  Yes  []  No     Date Range for reporting period:  Beginnin/10/2022  Ending: -    Progress report due (10 Rx/or 30 days whichever is less): 5791     Recertification due (POC duration/ or 90 days whichever is less):8/10/2022     Visit # Insurance Allowable Auth Needed   8 [employer] Medicare []Yes    []No     Pain level:  3-8/10   Functional Scale: Lumbar Spine (Foto 62 and RAJESH 15.6), Shoulder (Foto 52, Dash 38.3)   Date Assessed: 6/10/2022    SUBJECTIVE:  Patient reports that he 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 Feeling more discomfort in L hip and noted that he even felt some in posterior hip after this. Also had a hard time with flying and having lay overs, with sitting for prolonged periods. OBJECTIVE: tender to palpate in bilateral bicep and bilat posterior shoulders.   Observation:   Test measurements:      RESTRICTIONS/PRECAUTIONS: n/a    Exercises/Interventions:     Therapeutic Ex (90139)   Min: 25 sets/sec reps notes   Hip Flexor stretch in standing 0     Glute max stretch (SKTC) 0  bilat   Piriformis stretch supine 30 4 bilat   Hip extension POT 10sec 10 bilat   Hip abd at wall 10sec 10 bilat   Bridge  2 10    Kneeling Alt Arm-Leg- EOT 2 10    Side lying LB rot HEP  bilat   Review of HEP 0     Hooklying TA + alt march to 90/90 2 10    Multifidus chop 1 10 black         Prone sh ext with scap retraction 1 10 bilat   Prone row with scap retraction 1 10 bilat   Prone scap retraction with lift 1 10 bilat   SL punch 1 10 bilat   SL ER 1 10 bilat   Shoulder press/flex/ER 1 15 Brown wand   SA punch 1 15 2 lb, bilat   Standing band row 1 15 black   Standing band sh extension 1 15 black   Side stepping ER with band 1 15 Bilat, black   Side stepping IR with band with eccentric control to ER 1 15 red   GH depression 10 10 [place] ball   Shoulder ER isometrics at wall 10 10                Manual Intervention (29188) Min: 16 min      Prone hip ER and anterior hip mob  4    Prone L hip ER MET 0     Prone L hip anterior hip mob  0 min    Hip belt mobs L hip  4 min    Prone PA lumbar mobs  8 min    STM IT band + IT band mobs  0 min                      Shoulder posterior and inferior mobs, STM posterior shoulder and biceps  20 bilat                           NMR re-education (06777)   Min:      Mf Activation- re-ed      TrA Re-ed activation      Glute Max re-ed activation      Prone froggers      [place]            Therapeutic Activity (34566) Min:                                Therapeutic Exercise and NMR EXR  [x] (02950) Provided verbal/tactile cueing for activities related to strengthening, flexibility, endurance, ROM  for improvements in proximal hip and core control with self care, mobility, lifting and ambulation. [x] (86241) Provided verbal/tactile cueing for activities related to improving balance, coordination, kinesthetic sense, posture, motor skill, proprioception  to assist with core control in self care, mobility, lifting, and ambulation.      Therapeutic Activities:    [] (12517 or ) Provided verbal/tactile cueing for activities related to improving balance, coordination, kinesthetic sense, posture, motor skill, proprioception and motor activation to allow for proper function  with self care and ADLs  [] (96409) Provided training and instruction to the patient for proper core and proximal hip recruitment and positioning with ambulation re-education     Home Exercise Program:    [x] (64746) Reviewed/Progressed HEP activities related to strengthening, flexibility, endurance, ROM of core, proximal hip and LE for functional self-care, mobility, lifting and ambulation   [] (39600) Reviewed/Progressed HEP activities related to improving balance, coordination, kinesthetic sense, posture, motor skill, proprioception of core, proximal hip and LE for self care, mobility, lifting, and ambulation      Manual Treatments:  PROM / STM / Oscillations-Mobs:  G-I, II, III, IV (PA's, Inf., Post.)  [x] (35442) Provided manual therapy to mobilize proximal hip and LS spine soft tissue/joints for the purpose of modulating pain, promoting relaxation,  increasing ROM, reducing/eliminating soft tissue swelling/inflammation/restriction, improving soft tissue extensibility and allowing for proper ROM for normal function with self care, mobility, lifting and ambulation.      Modalities:       Charges:  Timed Code Treatment Minutes: 41   Total Treatment Minutes: 42     [] EVAL (LOW) 68513 (typically 20 minutes face-to-face)  [] EVAL (MOD) 82943 (typically 30 minutes face-to-face)  [] EVAL (HIGH) 54620 (typically 45 minutes face-to-face)  [] RE-EVAL     [x] LI(24970) x  2   [] DRY NEEDLE 1 OR 2 MUSCLES  [] NMR (70810) x     [] DRY NEEDLE 3+ MUSCLES  [x] Manual (18350) x  1     [] TA (33614) x     [] Mech Traction (67119)  [] ES(attended) (61355)     [] ES (un) (90300):   [] VASO (13206)  [] Other:    If BW Please Indicate Time In/Out  CPT Code Time in Time out                                     GOALS:  Patient stated goal: relieve shoulder and buttock/leg pain  [] Progressing: [] Met: [] Not Met: [] Adjusted  Therapist goals for Patient:   Short Term Goals: To be achieved in: 2 weeks  1. Independent in HEP and progression per patient tolerance, in order to prevent re-injury. [] Progressing: [] Met: [] Not Met: [] Adjusted  2. Patient will have a decrease in pain to facilitate improvement in movement, function, and ADLs as indicated by Functional Deficits. [] Progressing: [] Met: [] Not Met: [] Adjusted    Long Term Goals: To be achieved in: 8 weeks  1. Disability index score of 62 or better for Foto (shoulder) and 25 or better for Dash score (Foto). Disability index score of 10 or better for RAJESH (Foto). To assist with reaching prior level of function. [] Progressing: [] Met: [] Not Met: [] Adjusted  2. Patient will demonstrate increased AROM to at least 160 degrees forward elevation of shoulder and good LS mobility, good hip ROM to allow for proper joint functioning as indicated by patients Functional Deficits. [] Progressing: [] Met: [] Not Met: [] Adjusted  3. Patient will demonstrate an increase in Strength of bilateral shoulder girdle and good proximal hip and core activation to allow for proper functional mobility as indicated by patients Functional Deficits. [] Progressing: [] Met: [] Not Met: [] Adjusted  4. Patient will return to lifting and reaching in functional planes with decreased painful arc of shoulder at  degrees without increased symptoms or restriction. [] Progressing: [] Met: [] Not Met: [] Adjusted  5. Patient will report decreased lateral thigh pain with sitting and standing for greater than 30-60 minutes. (patient specific functional goal)    [] Progressing: [] Met: [] Not Met: [] Adjusted    ASSESSMENT:  Patient with improving hip ROM bilaterally, L hip more tight today compared to last session, but R doing well especially with ER. Patient program for core progressed further.  Continues to have difficulty with co-contraction of core and proximal hip. Needs further progression of glute max and med. Will do update for shoulders and back at next visit. Treatment/Activity Tolerance:  [x] Patient tolerated treatment well [] Patient limited by fatique  [] Patient limited by pain  [] Patient limited by other medical complications  [] Other:     Overall Progression Towards Functional goals/ Treatment Progress Update:  [] Patient is progressing as expected towards functional goals listed. [] Progression is slowed due to complexities/Impairments listed. [] Progression has been slowed due to co-morbidities. [x] Plan just implemented, too soon to assess goals progression <30days   [] Goals require adjustment due to lack of progress  [] Patient is not progressing as expected and requires additional follow up with physician  [] Other:    Prognosis for POC: [x] Good [] Fair  [] Poor    Patient requires continued skilled intervention: [x] Yes  [] No        PLAN: See eval  [] Continue per plan of care [] Alter current plan (see comments)  [x] Plan of care initiated [] Hold pending MD visit [] Discharge    Electronically signed by: Bimal Lei PT    Note: If patient does not return for scheduled/recommended follow up visits, this note will serve as a discharge from care along with the most recent update on progress.

## 2022-07-21 ENCOUNTER — HOSPITAL ENCOUNTER (OUTPATIENT)
Dept: PHYSICAL THERAPY | Age: 77
Setting detail: THERAPIES SERIES
Discharge: HOME | End: 2022-07-21
Payer: MEDICARE

## 2022-07-21 ENCOUNTER — APPOINTMENT (OUTPATIENT)
Dept: PHYSICAL THERAPY | Age: 77
End: 2022-07-21
Payer: MEDICARE

## 2022-07-21 PROCEDURE — 97140 MANUAL THERAPY 1/> REGIONS: CPT

## 2022-07-21 NOTE — PLAN OF CARE
Todd [facility]onel  Phone: [phone]   Fax:     [phone]        Physical Therapy Re-Certification Plan of Care/MD UPDATE      Dear Dr Robi Snow and Dr Chandrakant Mendiola ,    We had the pleasure of treating the following patient for physical therapy services at [address] [place] [address]. A summary of our findings can be found in the updated assessment below. This includes our plan of care. If you have any questions or concerns regarding these findings, please do not hesitate to contact me at the office phone number checked above.   Thank you for the referral.     Physician Signature:________________________________Date:__________________  By signing above (or electronic signature), therapists plan is approved by physician    Date Range Of Visits: 6/10/2022 thru 2022  Total Visits to Date: 9  Overall Response to Treatment:   [x]Patient is responding well to treatment and improvement is noted with regards  to goals   []Patient should continue to improve in reasonable time if they continue HEP   []Patient has plateaued and is no longer responding to skilled PT intervention    []Patient is getting worse and would benefit from return to referring MD   []Patient unable to adhere to initial POC   []Other:       Physical Therapy Treatment Note/ Progress Report:     Date:  2022      Patient Name:  Wilfrid Montoya    :  1945  MRN: [id number]  Restrictions/Precautions:    Medical/Treatment Diagnosis Information:  Diagnosis: R shoulder pain, L shoulder pain, B RTC tear, low back pain, L knee pain  Treatment Diagnosis: tear RTC M75.100, R shoulder pain M25.511, L shoulder pain M25.512, lumbar pain M54.5, L knee pain M25.562, post laminectomy syndrome O97.2  Insurance/Certification information:  PT Insurance Information: [employer] Medicare  Physician Information:    Dr Robi Snow and Dr Ilia Mcdaniels of care signed (Y/N): Date of Patient follow up with Physician:      Progress Report: [x]  Yes  []  No     Date Range for reporting period:  Beginnin/10/2022  Endin2022    Progress report due (10 Rx/or 30 days whichever is less):      Recertification due (POC duration/ or 90 days whichever is less):8/10/2022     Visit # Insurance Allowable Auth Needed   9 [employer] Medicare []Yes    []No     Pain level:  3-8/10   Functional Scale: Lumbar Spine (Foto 70 and RAJESH 7.5), Shoulder (Foto 61, Dash 21.7)     Date Assessed: 2022    SUBJECTIVE:  Patient reports that overall he is feeling some improvement. 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 Notes that PT and his stretches the other day helped to loosen this up more. after being more active. Reports that he is still feeling discomfort in L hip and noted that he even felt some in posterior hip after this. OBJECTIVE: tender to palpate in bilateral bicep and bilat posterior shoulders.   Observation:   Test measurements:      ROM   Comments   Lumbar Flex 80 degrees Fingertips to toes   Lumbar Ext 20 degrees        ROM LEFT RIGHT Comments   Lumbar Side Bend 18 18     Lumbar Rotation limited limited     Quadrant negative negative     Hip Flexion 110 114     Hip Abd 45 45     Hip ER 50 55     Hip IR 20 15     Hip Extension limited Limited, not as greatly as L     Knee Ext WNL WNL     Knee Flex WNL WNL     Hamstring Flex full 10% limited     Piriformis 25% limited 10% limited     Vladimir test                   HIP FLEX 4 4-    HIP ABD 4 4    HIP EXT 4- 4       ROM LEFT RIGHT Comments   Shoulder flexion 170 165 Painful arc at  degrees on R only now   Shoulder abduction 110 145    Shoulder ER 60 55     Shoulder IR T6 T5                         STRENGTH LEFT RIGHT     Shoulder flexion 3/5 3/5 With pain   Shoulder scaption 3/5 3/5  with pain   Shoulder ER 4-/5 3+/5     Shoulder IR 4/5 4/5 RESTRICTIONS/PRECAUTIONS: n/a    Exercises/Interventions:     Therapeutic Ex (47734)   Min: 5 sets/sec reps notes   Hip Flexor stretch in standing 0     Glute max stretch (SKTC) 0  bilat   Piriformis stretch supine 30 4 bilat   Hip extension POT 10sec 10 bilat   Hip abd at wall 10sec 10 bilat   Bridge  2 10    Kneeling Alt Arm-Leg- EOT 2 10    Side lying LB rot HEP  bilat   Review of HEP 0     Hooklying TA + alt march to 90/90 2 10    Multifidus chop 1 10 black         Prone sh ext with scap retraction 1 10 bilat   Prone row with scap retraction 1 10 bilat   Prone scap retraction with lift 1 10 bilat   SL punch 1 10 bilat   SL ER 1 10 bilat   Shoulder press/flex/ER 1 15 Brown wand   SA punch 1 15 2 lb, bilat   Standing band row 1 15 black   Standing band sh extension 1 15 black   Side stepping ER with band 1 15 Bilat, black   Side stepping IR with band with eccentric control to ER 1 15 red   GH depression 10 10 [place] ball   Shoulder ER isometrics at wall 10 10                Manual Intervention (55822) Min: 26 min      Prone hip ER and anterior hip mob  8 min L   Prone L hip ER MET  2 min    Prone L hip anterior hip mob  0 min    Hip belt mobs bilat hip  8 min    Prone PA lumbar mobs  8 min    STM IT band + IT band mobs  0 min                      Shoulder posterior and inferior mobs, STM posterior shoulder and biceps  20 bilat                           NMR re-education (07605)   Min:      Mf Activation- re-ed      TrA Re-ed activation      Glute Max re-ed activation      Prone froggers      [place]            Therapeutic Activity (22719) Min:                                Therapeutic Exercise and NMR EXR  [x] (71572) Provided verbal/tactile cueing for activities related to strengthening, flexibility, endurance, ROM  for improvements in proximal hip and core control with self care, mobility, lifting and ambulation.   [x] (66107) Provided verbal/tactile cueing for activities related to improving balance, ES(attended) (52125)     [] ES (un) (27837):   [] VASO (22486)  [] Other:    If BWC Please Indicate Time In/Out  CPT Code Time in Time out                                     GOALS:  Patient stated goal: relieve shoulder and buttock/leg pain  [x] Progressing: [] Met: [] Not Met: [] Adjusted  Therapist goals for Patient:   Short Term Goals: To be achieved in: 2 weeks  1. Independent in HEP and progression per patient tolerance, in order to prevent re-injury. [] Progressing: [x] Met: [] Not Met: [] Adjusted  2. Patient will have a decrease in pain to facilitate improvement in movement, function, and ADLs as indicated by Functional Deficits. [x] Progressing: [] Met: [] Not Met: [] Adjusted    Long Term Goals: To be achieved in: 8 weeks  1. Disability index score of 62 or better for Foto (shoulder) and 25 or better for Dash score (Foto). Disability index score of 10 or better for RAJESH (Foto). To assist with reaching prior level of function. (MET BOLDED ITEMS)  [x] Progressing: [] Met: [] Not Met: [] Adjusted  2. Patient will demonstrate increased AROM to at least 160 degrees forward elevation of shoulder and good LS mobility, good hip ROM to allow for proper joint functioning as indicated by patients Functional Deficits. [] Progressing: [x] Met: [] Not Met: [] Adjusted  3. Patient will demonstrate an increase in Strength of bilateral shoulder girdle and good proximal hip and core activation to allow for proper functional mobility as indicated by patients Functional Deficits. [x] Progressing: [] Met: [] Not Met: [] Adjusted  4. Patient will return to lifting and reaching in functional planes with decreased painful arc of shoulder at  degrees without increased symptoms or restriction. [x] Progressing: [] Met: [] Not Met: [] Adjusted  5.  Patient will report decreased lateral thigh pain with sitting and standing for greater than 30-60 minutes. (patient specific functional goal)    [x] Progressing: [] Met: [] Not Met: [] Adjusted    ASSESSMENT:  Patient has been seen for 9 visits of physical therapy to address bilateral shoulders, lumbar spine and L hip/buttock pain. Patient has been making good progress in all aspects. Lumbar pain has decreased, with improvement in lumbar ROM and hip ROM; however still restricted in full hip ER/IR motion. Patient still has issues with symptoms into lateral thigh, however has diminished and is less intense. Patient shoulder ROM has improved in [place] elevation bilaterally with no painful arc during flexion on L shoulder- only on R now. Focus has alternated between sessions in order to cover all areas of concern, therefore progression of shoulder strengthening program has been limited. Patient has been very compliant with all HEP provided for core, hips and shoulders to date. Will continue to progress home program for all areas of noted deficits. Treatment/Activity Tolerance:  [x] Patient tolerated treatment well [] Patient limited by fatique  [] Patient limited by pain  [] Patient limited by other medical complications  [] Other:     Overall Progression Towards Functional goals/ Treatment Progress Update:  [x] Patient is progressing as expected towards functional goals listed. [] Progression is slowed due to complexities/Impairments listed. [] Progression has been slowed due to co-morbidities.   [] Plan just implemented, too soon to assess goals progression <30days   [] Goals require adjustment due to lack of progress  [] Patient is not progressing as expected and requires additional follow up with physician  [] Other:    Prognosis for POC: [x] Good [] Fair  [] Poor    Patient requires continued skilled intervention: [x] Yes  [] No        PLAN:   [x] Continue per plan of care [] Alter current plan (see comments)  [] Plan of care initiated [] Hold pending MD visit [] Discharge    Electronically signed by: Louisa Wright PT    Note: If patient does not return for scheduled/recommended follow up visits, this note will serve as a discharge from care along with the most recent update on progress.

## 2022-07-22 ENCOUNTER — APPOINTMENT (OUTPATIENT)
Dept: PHYSICAL THERAPY | Age: 77
End: 2022-07-22
Payer: MEDICARE

## 2022-07-26 ENCOUNTER — HOSPITAL ENCOUNTER (OUTPATIENT)
Dept: PHYSICAL THERAPY | Age: 77
Setting detail: THERAPIES SERIES
Discharge: HOME | End: 2022-07-26
Payer: MEDICARE

## 2022-07-26 PROCEDURE — 97110 THERAPEUTIC EXERCISES: CPT

## 2022-07-26 PROCEDURE — 97140 MANUAL THERAPY 1/> REGIONS: CPT

## 2022-07-26 PROCEDURE — 20560 NDL INSJ W/O NJX 1 OR 2 MUSC: CPT

## 2022-07-26 PROCEDURE — 97032 APPL MODALITY 1+ESTIM EA 15: CPT

## 2022-07-26 NOTE — FLOWSHEET NOTE
Todd Coronel  Phone: [phone]   Fax:     [phone]     Physical Therapy Treatment Note/ Progress Report:     Date:  2022      Patient Name:  Kevon Hamilton    :  1945  MRN: [id number]  Restrictions/Precautions:    Medical/Treatment Diagnosis Information:  Diagnosis: R shoulder pain, L shoulder pain, B RTC tear, low back pain, L knee pain  Treatment Diagnosis: tear RTC M75.100, R shoulder pain M25.511, L shoulder pain M25.512, lumbar pain M54.5, L knee pain M25.562, post laminectomy syndrome W48.3  Insurance/Certification information:  PT Insurance Information: [employer]  Physician Information:    Dr Andrzej Gallegos and Dr Rafia Sloan of care signed (Y/N):     Date of Patient follow up with Physician:      Progress Report: [x]  Yes  []  No     Date Range for reporting period:  Beginnin2022  Ending: -    Progress report due (10 Rx/or 30 days whichever is less):      Recertification due (POC duration/ or 90 days whichever is less):8/10/2022     Visit # Insurance Allowable Auth Needed   10 [employer] Medicare []Yes    []No     Pain level:  3-8/10   Functional Scale: Lumbar Spine (Foto 70 and RAJESH 7.5), Shoulder (Foto 61, Dash 21.7)     Date Assessed: 2022    SUBJECTIVE:  Patient reports that overall he is feeling some improvement. Feels that shoulders are what they are. More issue with his back and L hip. Reports when he does a lot of activity will feel increased burning along L IT band area and at times into shin. Otherwise, doing well with his stretches and HEP. OBJECTIVE: tender to palpate in bilateral bicep and bilat posterior shoulders.   Observation:   Test measurements:      ROM   Comments   Lumbar Flex 80 degrees Fingertips to toes   Lumbar Ext 20 degrees        ROM LEFT RIGHT Comments   Lumbar Side Bend 18 18     Lumbar Rotation limited limited     Quadrant negative negative Hip Flexion 110 114     Hip Abd 45 45     Hip ER 50 55     Hip IR 20 15     Hip Extension limited Limited, not as greatly as L     Knee Ext WNL WNL     Knee Flex WNL WNL     Hamstring Flex full 10% limited     Piriformis 25% limited 10% limited     Vladimir test                   HIP FLEX 4 4-    HIP ABD 4 4    HIP EXT 4- 4       ROM LEFT RIGHT Comments   Shoulder flexion 170 165 Painful arc at  degrees on R only now   Shoulder abduction 110 145    Shoulder ER 60 55     Shoulder IR T6 T5                         STRENGTH LEFT RIGHT     Shoulder flexion 3/5 3/5 With pain   Shoulder scaption 3/5 3/5  with pain   Shoulder ER 4-/5 3+/5     Shoulder IR 4/5 4/5        RESTRICTIONS/PRECAUTIONS: n/a    Exercises/Interventions:     Therapeutic Ex (92724)   Min: 10 sets/sec reps notes   Hip Flexor stretch in standing 0     Glute max stretch (SKTC) 30 4 bilat   Piriformis stretch supine 30 4 bilat   Hip extension POT 10sec 10 bilat   Hip abd at wall 10sec 10 bilat   Bridge  0     Kneeling Alt Arm-Leg- EOT 0     Side lying LB rot HEP  bilat   Review of HEP 0     Hooklying TA + alt march to 90/90 0     Multifidus chop 1 10 black         Prone sh ext with scap retraction 1 10 bilat   Prone row with scap retraction 1 10 bilat   Prone scap retraction with lift 1 10 bilat   SL punch 1 10 bilat   SL ER 1 10 bilat   Shoulder press/flex/ER 1 15 Brown wand   SA punch 1 15 2 lb, bilat   Standing band row 1 15 black   Standing band sh extension 1 15 black   Side stepping ER with band 1 15 Bilat, black   Side stepping IR with band with eccentric control to ER 1 15 red   GH depression 10 10 Thunderbolt ball   Shoulder ER isometrics at wall 10 10                Manual Intervention (06101) Min: 16 min      Prone hip ER and anterior hip mob  6 min L   Prone L hip ER MET  0 min    Prone L hip anterior hip mob  0 min    Hip belt mobs bilat hip  4 min L   Prone PA lumbar mobs  6 min    STM IT band + IT band mobs  0 min Shoulder posterior and inferior mobs, STM posterior shoulder and biceps  0 bilat                           NMR re-education (49202)   Min:      Mf Activation- re-ed      TrA Re-ed activation      Glute Max re-ed activation      Prone zandra Shane            Therapeutic Activity (14325) Min:                  DN  5    ESTIM  10      Spoke with   regarding the use of Dry Needling     Dry needling manual therapy: consisted on the placement of 8 needles in the following muscles:  bilateral multifidus L3/4, L4/5 and L5/S1 and L glute max. A 60 mm needle was inserted, piston, rotated, and coned to produce intramuscular mobilization. These techniques were used to restore functional range of motion, reduce muscle spasm and induce healing in the corresponding musculature. (93050)  Clean Technique was utilized today while applying Dry needling treatment. The treatment sites where cleaned with 70% solution of  isopropyl alcohol . The PT washed their hands and utilized treatment gloves along with hand  prior to inserting the needles. All needles where removed and discarded in the appropriate sharps container. MD has given verbal and/or written approval for this treatment. Attended low frequency (1-20Hz) electrical stimulation was utilized in conjunction with Dry Needling:  the Estim was manipulated between all above needles for a period of 10 min. at 4 volts. The low frequency electrical stimulation was used to help reduce muscle spasm and help to interrupt /[place] the pain cycle. (78919)       Therapeutic Exercise and NMR EXR  [x] (11173) Provided verbal/tactile cueing for activities related to strengthening, flexibility, endurance, ROM  for improvements in proximal hip and core control with self care, mobility, lifting and ambulation.   [x] (12144) Provided verbal/tactile cueing for activities related to improving balance, coordination, kinesthetic sense, posture, motor skill, proprioception  to assist with core control in self care, mobility, lifting, and ambulation. Therapeutic Activities:    [] (16140 or 68806) Provided verbal/tactile cueing for activities related to improving balance, coordination, kinesthetic sense, posture, motor skill, proprioception and motor activation to allow for proper function  with self care and ADLs  [] (99548) Provided training and instruction to the patient for proper core and proximal hip recruitment and positioning with ambulation re-education     Home Exercise Program:    [x] (91362) Reviewed/Progressed HEP activities related to strengthening, flexibility, endurance, ROM of core, proximal hip and LE for functional self-care, mobility, lifting and ambulation   [] (89969) Reviewed/Progressed HEP activities related to improving balance, coordination, kinesthetic sense, posture, motor skill, proprioception of core, proximal hip and LE for self care, mobility, lifting, and ambulation      Manual Treatments:  PROM / STM / Oscillations-Mobs:  G-I, II, III, IV (PA's, Inf., Post.)  [x] (48065) Provided manual therapy to mobilize proximal hip and LS spine soft tissue/joints for the purpose of modulating pain, promoting relaxation,  increasing ROM, reducing/eliminating soft tissue swelling/inflammation/restriction, improving soft tissue extensibility and allowing for proper ROM for normal function with self care, mobility, lifting and ambulation.      Modalities:       Charges:  Timed Code Treatment Minutes: 26   Total Treatment Minutes: 41     [] EVAL (LOW) 84827 (typically 20 minutes face-to-face)  [] EVAL (MOD) 73866 (typically 30 minutes face-to-face)  [] EVAL (HIGH) 22443 (typically 45 minutes face-to-face)  [] RE-EVAL     [x] RK(77654) x  1  [x] DRY NEEDLE 1 OR 2 MUSCLES  [] NMR (69445) x     [] DRY NEEDLE 3+ MUSCLES  [x] Manual (40457) x  1   [] TA (71849) x     [] Mech Traction (09622)  [x] ES(attended) (30579)     [] ES (un) (53507):   [] VASO (42539)  [] Other:    If [facility] Please Indicate Time In/Out  CPT Code Time in Time out                                     GOALS:  Patient stated goal: relieve shoulder and buttock/leg pain  [x] Progressing: [] Met: [] Not Met: [] Adjusted  Therapist goals for Patient:   Short Term Goals: To be achieved in: 2 weeks  1. Independent in HEP and progression per patient tolerance, in order to prevent re-injury. [] Progressing: [x] Met: [] Not Met: [] Adjusted  2. Patient will have a decrease in pain to facilitate improvement in movement, function, and ADLs as indicated by Functional Deficits. [x] Progressing: [] Met: [] Not Met: [] Adjusted    Long Term Goals: To be achieved in: 8 weeks  1. Disability index score of 62 or better for Foto (shoulder) and 25 or better for Dash score (Foto). Disability index score of 10 or better for RAJESH (Foto). To assist with reaching prior level of function. (MET BOLDED ITEMS)  [x] Progressing: [] Met: [] Not Met: [] Adjusted  2. Patient will demonstrate increased AROM to at least 160 degrees forward elevation of shoulder and good LS mobility, good hip ROM to allow for proper joint functioning as indicated by patients Functional Deficits. [] Progressing: [x] Met: [] Not Met: [] Adjusted  3. Patient will demonstrate an increase in Strength of bilateral shoulder girdle and good proximal hip and core activation to allow for proper functional mobility as indicated by patients Functional Deficits. [x] Progressing: [] Met: [] Not Met: [] Adjusted  4. Patient will return to lifting and reaching in functional planes with decreased painful arc of shoulder at  degrees without increased symptoms or restriction. [x] Progressing: [] Met: [] Not Met: [] Adjusted  5.  Patient will report decreased lateral thigh pain with sitting and standing for greater than 30-60 minutes. (patient specific functional goal)    [x] Progressing: [] Met: [] Not Met: [] Adjusted    ASSESSMENT:  Patient with continued IND and improvement with all hip ROM, however still somewhat restricted in lumbar spine and with tenderness in L5/S1 referral pattern to L glute. Patient agreeable to DN and tolerated DN well with significant improvement in overall prone hip ER. Good improvement in ROM with stretches for L hip as well. Patient with less overall tenderness and tightness in lumbar spine. Will continue as indicated. Treatment/Activity Tolerance:  [x] Patient tolerated treatment well [] Patient limited by fatique  [] Patient limited by pain  [] Patient limited by other medical complications  [] Other:     Overall Progression Towards Functional goals/ Treatment Progress Update:  [x] Patient is progressing as expected towards functional goals listed. [] Progression is slowed due to complexities/Impairments listed. [] Progression has been slowed due to co-morbidities. [] Plan just implemented, too soon to assess goals progression <30days   [] Goals require adjustment due to lack of progress  [] Patient is not progressing as expected and requires additional follow up with physician  [] Other:    Prognosis for POC: [x] Good [] Fair  [] Poor    Patient requires continued skilled intervention: [x] Yes  [] No        PLAN:   [x] Continue per plan of care [] Alter current plan (see comments)  [] Plan of care initiated [] Hold pending MD visit [] Discharge    Electronically signed by: Grey Perales PT    Note: If patient does not return for scheduled/recommended follow up visits, this note will serve as a discharge from care along with the most recent update on progress.

## 2022-07-29 ENCOUNTER — HOSPITAL ENCOUNTER (OUTPATIENT)
Dept: PHYSICAL THERAPY | Age: 77
Setting detail: THERAPIES SERIES
Discharge: HOME | End: 2022-07-29
Payer: MEDICARE

## 2022-07-29 PROCEDURE — 20560 NDL INSJ W/O NJX 1 OR 2 MUSC: CPT

## 2022-07-29 PROCEDURE — 97140 MANUAL THERAPY 1/> REGIONS: CPT

## 2022-07-29 PROCEDURE — 97110 THERAPEUTIC EXERCISES: CPT

## 2022-07-29 PROCEDURE — 97032 APPL MODALITY 1+ESTIM EA 15: CPT

## 2022-07-29 NOTE — FLOWSHEET NOTE
Karel [address] [address]  Phone: [phone]   Fax:     [phone]     Physical Therapy Treatment Note/ Progress Report:     Date:  2022      Patient Name:  Darren Bui    :  1945  MRN: [id number]  Restrictions/Precautions:    Medical/Treatment Diagnosis Information:  Diagnosis: R shoulder pain, L shoulder pain, B RTC tear, low back pain, L knee pain  Treatment Diagnosis: tear RTC M75.100, R shoulder pain M25.511, L shoulder pain M25.512, lumbar pain M54.5, L knee pain M25.562, post laminectomy syndrome M94.0  Insurance/Certification information:  PT Insurance Information: [employer]  Physician Information:    Dr Brad Mckenna and Dr Joaquina Carmen of care signed (Y/N):     Date of Patient follow up with Physician:      Progress Report: [x]  Yes  []  No     Date Range for reporting period:  Beginnin2022  Ending: -    Progress report due (10 Rx/or 30 days whichever is less):      Recertification due (POC duration/ or 90 days whichever is less):8/10/2022     Visit # Insurance Allowable Auth Needed   11 [employer] Medicare []Yes    []No     Pain level:  3-8/10   Functional Scale: Lumbar Spine (Foto 70 and RAJESH 7.5), Shoulder (Foto 61, Dash 21.7)     Date Assessed: 2022    SUBJECTIVE:  Patient reports that overall he is feeling some improvement. Feels that the DN was helpful in keeping back and hips more loose overall. States that he did quite a bit of walking at [employer] and was feeling ok with this. States that he was a bit sore after the DN. Would like to DN again today. OBJECTIVE: tender to palpate in bilateral bicep and bilat posterior shoulders.   Observation:   Test measurements:      ROM   Comments   Lumbar Flex 80 degrees Fingertips to toes   Lumbar Ext 20 degrees        ROM LEFT RIGHT Comments   Lumbar Side Bend 18 18     Lumbar Rotation limited limited     Quadrant negative negative Hip Flexion 110 114     Hip Abd 45 45     Hip ER 50 55     Hip IR 20 15     Hip Extension limited Limited, not as greatly as L     Knee Ext WNL WNL     Knee Flex WNL WNL     Hamstring Flex full 10% limited     Piriformis 25% limited 10% limited     Vladimir test                   HIP FLEX 4 4-    HIP ABD 4 4    HIP EXT 4- 4       ROM LEFT RIGHT Comments   Shoulder flexion 170 165 Painful arc at  degrees on R only now   Shoulder abduction 110 145    Shoulder ER 60 55     Shoulder IR T6 T5                         STRENGTH LEFT RIGHT     Shoulder flexion 3/5 3/5 With pain   Shoulder scaption 3/5 3/5  with pain   Shoulder ER 4-/5 3+/5     Shoulder IR 4/5 4/5        RESTRICTIONS/PRECAUTIONS: n/a    Exercises/Interventions:     Therapeutic Ex (61507)   Min: 10 sets/sec reps notes   Hip Flexor stretch in standing 0     Glute max stretch (SKTC) 30 4 bilat   Piriformis stretch supine 30 4 bilat   Hip extension POT 10sec 10 bilat   Hip abd at wall 10sec 10 bilat   Bridge  2 10    Kneeling Alt Arm-Leg- EOT 0     Side lying LB rot HEP  bilat   Review of HEP 0     Hooklying TA + alt march to 90/90 0     Multifidus chop 1 10 black         Prone sh ext with scap retraction 1 10 bilat   Prone row with scap retraction 1 10 bilat   Prone scap retraction with lift 1 10 bilat   SL punch 1 10 bilat   SL ER 1 10 bilat   Shoulder press/flex/ER 1 15 Brown wand   SA punch 1 15 2 lb, bilat   Standing band row 1 15 black   Standing band sh extension 1 15 black   Side stepping ER with band 1 15 Bilat, black   Side stepping IR with band with eccentric control to ER 1 15 red   GH depression 10 10 [place] ball   Shoulder ER isometrics at wall 10 10                Manual Intervention (56149) Min: 19 min      Prone hip ER and anterior hip mob  6 min L   Prone L hip ER MET  0 min    Prone L hip anterior hip mob  3 min    Hip belt mobs bilat hip  4 min L   Prone PA lumbar mobs  6 min    STM IT band + IT band mobs  0 min Shoulder posterior and inferior mobs, STM posterior shoulder and biceps  0 bilat                           NMR re-education (09459)   Min:      Mf Activation- re-ed      TrA Re-ed activation      Glute Max re-ed activation      Prone zandra Shane            Therapeutic Activity (47709) Min:                  DN  5    ESTIM  10      Spoke with   regarding the use of Dry Needling     Dry needling manual therapy: consisted on the placement of 6 needles in the following muscles:  L multifidus L3/4, L4/5 and L IT band  A 60 mm needle was inserted, piston, rotated, and coned to produce intramuscular mobilization. These techniques were used to restore functional range of motion, reduce muscle spasm and induce healing in the corresponding musculature. (26097)  Clean Technique was utilized today while applying Dry needling treatment. The treatment sites where cleaned with 70% solution of  isopropyl alcohol . The PT washed their hands and utilized treatment gloves along with hand  prior to inserting the needles. All needles where removed and discarded in the appropriate sharps container. MD has given verbal and/or written approval for this treatment. Attended low frequency (1-20Hz) electrical stimulation was utilized in conjunction with Dry Needling:  the Estim was manipulated between all above needles for a period of 10 min. at 4 volts. The low frequency electrical stimulation was used to help reduce muscle spasm and help to interrupt /[place] the pain cycle. (68091)       Therapeutic Exercise and NMR EXR  [x] (44194) Provided verbal/tactile cueing for activities related to strengthening, flexibility, endurance, ROM  for improvements in proximal hip and core control with self care, mobility, lifting and ambulation.   [x] (83633) Provided verbal/tactile cueing for activities related to improving balance, coordination, kinesthetic sense, posture, motor skill, proprioception  to assist with core control in self care, mobility, lifting, and ambulation. Therapeutic Activities:    [] (74466 or 24303) Provided verbal/tactile cueing for activities related to improving balance, coordination, kinesthetic sense, posture, motor skill, proprioception and motor activation to allow for proper function  with self care and ADLs  [] (65282) Provided training and instruction to the patient for proper core and proximal hip recruitment and positioning with ambulation re-education     Home Exercise Program:    [x] (78413) Reviewed/Progressed HEP activities related to strengthening, flexibility, endurance, ROM of core, proximal hip and LE for functional self-care, mobility, lifting and ambulation   [] (81363) Reviewed/Progressed HEP activities related to improving balance, coordination, kinesthetic sense, posture, motor skill, proprioception of core, proximal hip and LE for self care, mobility, lifting, and ambulation      Manual Treatments:  PROM / STM / Oscillations-Mobs:  G-I, II, III, IV (PA's, Inf., Post.)  [x] (73304) Provided manual therapy to mobilize proximal hip and LS spine soft tissue/joints for the purpose of modulating pain, promoting relaxation,  increasing ROM, reducing/eliminating soft tissue swelling/inflammation/restriction, improving soft tissue extensibility and allowing for proper ROM for normal function with self care, mobility, lifting and ambulation.      Modalities:       Charges:  Timed Code Treatment Minutes: 29   Total Treatment Minutes: 44     [] EVAL (LOW) 24610 (typically 20 minutes face-to-face)  [] EVAL (MOD) 16156 (typically 30 minutes face-to-face)  [] EVAL (HIGH) 96467 (typically 45 minutes face-to-face)  [] RE-EVAL     [x] LQ(25414) x  1  [x] DRY NEEDLE 1 OR 2 MUSCLES  [] NMR (21054) x     [] DRY NEEDLE 3+ MUSCLES  [x] Manual (70912) x  1   [] TA (17600) x     [] Mech Traction (58030)  [x] ES(attended) (12993)     [] ES (un) (84561):   [] VASO (87339)  [] Other:    If [facility] Please R lumbar multifidus today. Still with mild restriction in L lumbar spine and noting increased tension in IT band. Tolerated DN with good improvement in mobility. Patient with full prone hip ER after DN. Still a bit restricted in IR which improves greatly with belt mobs. Patient needing cues for improved form and decreased trunk rotation with multifidus chop and co-contraction exercises of core and proximal hip. Will continue as indicated. Treatment/Activity Tolerance:  [x] Patient tolerated treatment well [] Patient limited by fatique  [] Patient limited by pain  [] Patient limited by other medical complications  [] Other:     Overall Progression Towards Functional goals/ Treatment Progress Update:  [x] Patient is progressing as expected towards functional goals listed. [] Progression is slowed due to complexities/Impairments listed. [] Progression has been slowed due to co-morbidities. [] Plan just implemented, too soon to assess goals progression <30days   [] Goals require adjustment due to lack of progress  [] Patient is not progressing as expected and requires additional follow up with physician  [] Other:    Prognosis for POC: [x] Good [] Fair  [] Poor    Patient requires continued skilled intervention: [x] Yes  [] No        PLAN:   [x] Continue per plan of care [] Alter current plan (see comments)  [] Plan of care initiated [] Hold pending MD visit [] Discharge    Electronically signed by: Bimal Lei PT    Note: If patient does not return for scheduled/recommended follow up visits, this note will serve as a discharge from care along with the most recent update on progress.

## 2022-08-04 ENCOUNTER — HOSPITAL ENCOUNTER (OUTPATIENT)
Dept: PHYSICAL THERAPY | Age: 77
Setting detail: THERAPIES SERIES
Discharge: HOME | End: 2022-08-04
Payer: MEDICARE

## 2022-08-04 PROCEDURE — 97110 THERAPEUTIC EXERCISES: CPT

## 2022-08-04 PROCEDURE — 97140 MANUAL THERAPY 1/> REGIONS: CPT

## 2022-08-04 NOTE — FLOWSHEET NOTE
[address]  Phone: [phone]   Fax:     [phone]     Physical Therapy Treatment Note/ Progress Report:     Date:  2022      Patient Name:  Ana Geller    :  1945  MRN: [id number]  Restrictions/Precautions:    Medical/Treatment Diagnosis Information:  Diagnosis: R shoulder pain, L shoulder pain, B RTC tear, low back pain, L knee pain  Treatment Diagnosis: tear RTC M75.100, R shoulder pain M25.511, L shoulder pain M25.512, lumbar pain M54.5, L knee pain M25.562, post laminectomy syndrome Y37.8  Insurance/Certification information:  PT Insurance Information: [facility] [address] Medicare  Physician Information:    Dr Marilin Silva and Dr Tc Serna of care signed (Y/N):     Date of Patient follow up with Physician:      Progress Report: [x]  Yes  []  No     Date Range for reporting period:  Beginnin2022  Ending: -    Progress report due (10 Rx/or 30 days whichever is less):      Recertification due (POC duration/ or 90 days whichever is less):8/10/2022     Visit # Insurance Allowable Auth Needed   12 [employer] Medicare []Yes    []No     Pain level:  3-5/10   Functional Scale: Lumbar Spine (Foto 70 and RAJESH 7.5), Shoulder (Foto 61, Dash 21.7)     Date Assessed: 2022    SUBJECTIVE:  Patient reports that overall he is feeling some improvement. Had EMG and no nerve damage found at this time. Hoping to have some additional testing done to ensure cause of N/T that often occurs with prolonged walking. Does feel some improvement from DN. Did some mowing the other day and this adi his back and hips some. OBJECTIVE: tender to palpate in bilateral bicep and bilat posterior shoulders.   Observation:   Test measurements:      ROM   Comments   Lumbar Flex 80 degrees Fingertips to toes   Lumbar Ext 20 degrees        ROM LEFT RIGHT Comments   Lumbar Side Bend 18 18     Lumbar Rotation limited limited Quadrant negative negative     Hip Flexion 110 114     Hip Abd 45 45     Hip ER 50 55     Hip IR 20 15     Hip Extension limited Limited, not as greatly as L     Knee Ext WNL WNL     Knee Flex WNL WNL     Hamstring Flex full 10% limited     Piriformis 25% limited 10% limited     Vladimir test                   HIP FLEX 4 4-    HIP ABD 4 4    HIP EXT 4- 4       ROM LEFT RIGHT Comments   Shoulder flexion 170 165 Painful arc at  degrees on R only now   Shoulder abduction 110 145    Shoulder ER 60 55     Shoulder IR T6 T5                         STRENGTH LEFT RIGHT     Shoulder flexion 3/5 3/5 With pain   Shoulder scaption 3/5 3/5  with pain   Shoulder ER 4-/5 3+/5     Shoulder IR 4/5 4/5        RESTRICTIONS/PRECAUTIONS: n/a    Exercises/Interventions:     Therapeutic Ex (78689)   Min: 25 sets/sec reps notes   Hip Flexor EOT 30 4   L   Glute max stretch (SKTC) 30 4 bilat   Piriformis stretch supine 30 4 bilat   Hip extension POT 10sec 10 bilat   Hip abd at wall 10sec 10 bilat   Bridge  2 10    Kneeling Alt Arm-Leg- EOT 0     Side lying LB rot HEP  bilat   Review of HEP 0     Hooklying TA + alt march to 90/90 0     Multifidus chop 1 10 black   TM hip extension drives 1 10 bilat   Leg press hip extension 2 10 Bilat, 10 lb/ea   Prone sh ext with scap retraction 1 10 bilat   Prone row with scap retraction 1 10 bilat   Prone scap retraction with lift 1 10 bilat   SL punch 1 10 bilat   SL ER 1 10 bilat   Shoulder press/flex/ER 1 15 Brown wand   SA punch 1 15 2 lb, bilat   Standing band row 1 15 black   Standing band sh extension 1 15 black   Side stepping ER with band 1 15 Bilat, black   Side stepping IR with band with eccentric control to ER 1 15 red   GH depression 10 10 [place] ball   Shoulder ER isometrics at wall 10 10                Manual Intervention (63944) Min: 20 min      Prone hip ER and anterior hip mob  10 min bilat   Prone L hip ER MET  0 min    Prone L hip anterior hip mob  0 min    Hip belt mobs bilat hip 4 min L   Prone PA lumbar mobs  6 min    STM IT band + IT band mobs  0 min                      Shoulder posterior and inferior mobs, STM posterior shoulder and biceps  0 bilat                           NMR re-education (47127)   Min:      Mf Activation- re-ed      TrA Re-ed activation      Glute Max re-ed activation      Prone zandra Shane            Therapeutic Activity (65316) Min:                      Therapeutic Exercise and NMR EXR  [x] (14816) Provided verbal/tactile cueing for activities related to strengthening, flexibility, endurance, ROM  for improvements in proximal hip and core control with self care, mobility, lifting and ambulation. [x] (04636) Provided verbal/tactile cueing for activities related to improving balance, coordination, kinesthetic sense, posture, motor skill, proprioception  to assist with core control in self care, mobility, lifting, and ambulation.      Therapeutic Activities:    [] (96824 or 37516) Provided verbal/tactile cueing for activities related to improving balance, coordination, kinesthetic sense, posture, motor skill, proprioception and motor activation to allow for proper function  with self care and ADLs  [] (09353) Provided training and instruction to the patient for proper core and proximal hip recruitment and positioning with ambulation re-education     Home Exercise Program:    [x] (63922) Reviewed/Progressed HEP activities related to strengthening, flexibility, endurance, ROM of core, proximal hip and LE for functional self-care, mobility, lifting and ambulation   [] (87564) Reviewed/Progressed HEP activities related to improving balance, coordination, kinesthetic sense, posture, motor skill, proprioception of core, proximal hip and LE for self care, mobility, lifting, and ambulation      Manual Treatments:  PROM / STM / Oscillations-Mobs:  G-I, II, III, IV (PA's, Inf., Post.)  [x] (46681) Provided manual therapy to mobilize proximal hip and LS spine soft tissue/joints for the purpose of modulating pain, promoting relaxation,  increasing ROM, reducing/eliminating soft tissue swelling/inflammation/restriction, improving soft tissue extensibility and allowing for proper ROM for normal function with self care, mobility, lifting and ambulation. Modalities:       Charges:  Timed Code Treatment Minutes: 45   Total Treatment Minutes: 45     [] EVAL (LOW) 97019 (typically 20 minutes face-to-face)  [] EVAL (MOD) 23709 (typically 30 minutes face-to-face)  [] EVAL (HIGH) 27358 (typically 45 minutes face-to-face)  [] RE-EVAL     [x] BA(84101) x  2  [] DRY NEEDLE 1 OR 2 MUSCLES  [] NMR (48838) x     [] DRY NEEDLE 3+ MUSCLES  [x] Manual (84824) x  1   [] TA (17498) x     [] Mech Traction (74043)  [] ES(attended) (29591)     [] ES (un) (68132):   [] VASO (84342)  [] Other:    If [facility] Please Indicate Time In/Out  CPT Code Time in Time out                                     GOALS:  Patient stated goal: relieve shoulder and buttock/leg pain  [x] Progressing: [] Met: [] Not Met: [] Adjusted  Therapist goals for Patient:   Short Term Goals: To be achieved in: 2 weeks  1. Independent in HEP and progression per patient tolerance, in order to prevent re-injury. [] Progressing: [x] Met: [] Not Met: [] Adjusted  2. Patient will have a decrease in pain to facilitate improvement in movement, function, and ADLs as indicated by Functional Deficits. [x] Progressing: [] Met: [] Not Met: [] Adjusted    Long Term Goals: To be achieved in: 8 weeks  1. Disability index score of 62 or better for Foto (shoulder) and 25 or better for Dash score (Foto). Disability index score of 10 or better for RAJESH (Foto). To assist with reaching prior level of function. (MET BOLDED ITEMS)  [x] Progressing: [] Met: [] Not Met: [] Adjusted  2.  Patient will demonstrate increased AROM to at least 160 degrees forward elevation of shoulder and good LS mobility, good hip ROM to allow for proper joint functioning as indicated by patients Functional Deficits. [] Progressing: [x] Met: [] Not Met: [] Adjusted  3. Patient will demonstrate an increase in Strength of bilateral shoulder girdle and good proximal hip and core activation to allow for proper functional mobility as indicated by patients Functional Deficits. [x] Progressing: [] Met: [] Not Met: [] Adjusted  4. Patient will return to lifting and reaching in functional planes with decreased painful arc of shoulder at  degrees without increased symptoms or restriction. [x] Progressing: [] Met: [] Not Met: [] Adjusted  5. Patient will report decreased lateral thigh pain with sitting and standing for greater than 30-60 minutes. (patient specific functional goal)    [x] Progressing: [] Met: [] Not Met: [] Adjusted    ASSESSMENT:  Patient with less overall restriction in glute max today. Hip ROM still limited in ER and extension bilaterally, but R is improving greater than L. Patient with mild restriction in lumbar spine which improved with mobilization. Increased focus on gluteal activation and drive through with hip extension to decrease load throughout lateral hip and IT band. Patient fatigued in glutes at conclusion. Treatment/Activity Tolerance:  [x] Patient tolerated treatment well [] Patient limited by fatique  [] Patient limited by pain  [] Patient limited by other medical complications  [] Other:     Overall Progression Towards Functional goals/ Treatment Progress Update:  [x] Patient is progressing as expected towards functional goals listed. [] Progression is slowed due to complexities/Impairments listed. [] Progression has been slowed due to co-morbidities.   [] Plan just implemented, too soon to assess goals progression <30days   [] Goals require adjustment due to lack of progress  [] Patient is not progressing as expected and requires additional follow up with physician  [] Other:    Prognosis for POC: [x] Good [] Fair  [] Poor    Patient requires continued skilled intervention: [x] Yes  [] No        PLAN:   [x] Continue per plan of care [] Alter current plan (see comments)  [] Plan of care initiated [] Hold pending MD visit [] Discharge    Electronically signed by: Grey Perales PT    Note: If patient does not return for scheduled/recommended follow up visits, this note will serve as a discharge from care along with the most recent update on progress.

## 2022-08-09 ENCOUNTER — HOSPITAL ENCOUNTER (OUTPATIENT)
Dept: PHYSICAL THERAPY | Age: 77
Setting detail: THERAPIES SERIES
Discharge: HOME | End: 2022-08-09
Payer: MEDICARE

## 2022-08-09 PROCEDURE — 97110 THERAPEUTIC EXERCISES: CPT

## 2022-08-09 PROCEDURE — 97140 MANUAL THERAPY 1/> REGIONS: CPT

## 2022-08-09 NOTE — FLOWSHEET NOTE
[facility]  Phone: [phone]   Fax:     [phone]     Physical Therapy Treatment Note/ Progress Report:     Date:  2022      Patient Name:  Patricio Nguyen    :  1945  MRN: [id number]  Restrictions/Precautions:    Medical/Treatment Diagnosis Information:  Diagnosis: R shoulder pain, L shoulder pain, B RTC tear, low back pain, L knee pain  Treatment Diagnosis: tear RTC M75.100, R shoulder pain M25.511, L shoulder pain M25.512, lumbar pain M54.5, L knee pain M25.562, post laminectomy syndrome H96.3  Insurance/Certification information:  PT Insurance Information: [facility] [address] Medicare  Physician Information:    Dr Lupe Parra and Dr Juan Cummins of care signed (Y/N):     Date of Patient follow up with Physician:      Progress Report: [x]  Yes  []  No     Date Range for reporting period:  Beginnin2022  Ending: -    Progress report due (10 Rx/or 30 days whichever is less):      Recertification due (POC duration/ or 90 days whichever is less):8/10/2022     Visit # Insurance Allowable Auth Needed   13 [employer] Medicare []Yes    []No     Pain level:  3-5/10   Functional Scale: Lumbar Spine (Foto 70 and RAJESH 7.5), Shoulder (Foto 61, Dash 21.7)     Date Assessed: 2022    SUBJECTIVE:  Patient reports that overall he is feeling some improvement. Reports that he was really sore though after last session. Felt more in his glutes and hips. Patient notes that he did mowing and this always jars his back. Reports that doing his stretches and exercises helps to alleviate the stiffness/tightness. Would like to work more with shoulders today. OBJECTIVE: tender to palpate in bilateral bicep and bilat posterior shoulders.   Observation:   Test measurements:      ROM   Comments   Lumbar Flex 80 degrees Fingertips to toes   Lumbar Ext 20 degrees        ROM LEFT RIGHT Comments   Lumbar Side Bend 18 18 Lumbar Rotation limited limited     Quadrant negative negative     Hip Flexion 110 114     Hip Abd 45 45     Hip ER 50 55     Hip IR 20 15     Hip Extension limited Limited, not as greatly as L     Knee Ext WNL WNL     Knee Flex WNL WNL     Hamstring Flex full 10% limited     Piriformis 25% limited 10% limited     Vladimir test                   HIP FLEX 4 4-    HIP ABD 4 4    HIP EXT 4- 4       ROM LEFT RIGHT Comments   Shoulder flexion 170 165 Painful arc at  degrees on R only now   Shoulder abduction 110 145    Shoulder ER 60 55     Shoulder IR T6 T5                         STRENGTH LEFT RIGHT     Shoulder flexion 3/5 3/5 With pain   Shoulder scaption 3/5 3/5  with pain   Shoulder ER 4-/5 3+/5     Shoulder IR 4/5 4/5        RESTRICTIONS/PRECAUTIONS: n/a    Exercises/Interventions:     Therapeutic Ex (09387)   Min: 25 sets/sec reps notes   Hip Flexor EOT 30 4   L   Glute max stretch (SKTC) 30 4 bilat   Piriformis stretch supine 30 4 bilat   Hip extension POT 10sec 10 bilat   Hip abd at wall 10sec 10 bilat   Bridge  2 10    Kneeling Alt Arm-Leg- EOT 0     Side lying LB rot HEP  bilat   Review of HEP 0     Hooklying TA + alt march to 90/90 0     Multifidus chop 1 10 black   TM hip extension drives 1 10 bilat   Leg press hip extension 2 10 Bilat, 10 lb/ea   Prone sh ext with scap retraction 1 10 bilat   Prone row with scap retraction 1 10 bilat   Prone scap retraction with lift 1 10 bilat   SL punch 1 10 bilat   SL ER 1 10 bilat   Shoulder press/flex/ER 1 15 Brown wand   SA punch 1 15 2 lb, bilat   Standing band row 1 15 black   Standing band sh extension 1 15 black   Side stepping ER with band 1 15 Bilat, green   Side stepping IR with band with eccentric control to ER 1 15 red   GH depression 10 10 [place] ball   Shoulder ER isometrics at wall 10 10    Ball roll on wall 2 10 Cw/ccw, white ball   Waterfalls 5 2 Very fatiguing, no wt   Lateral wall taps 1 15 red   Manual Intervention (76862) Min: 20 min      Prone Oscillations-Mobs:  G-I, II, III, IV (PA's, Inf., Post.)  [x] (86468) Provided manual therapy to mobilize proximal hip and LS spine soft tissue/joints for the purpose of modulating pain, promoting relaxation,  increasing ROM, reducing/eliminating soft tissue swelling/inflammation/restriction, improving soft tissue extensibility and allowing for proper ROM for normal function with self care, mobility, lifting and ambulation. Modalities:       Charges:  Timed Code Treatment Minutes: 45   Total Treatment Minutes: 45     [] EVAL (LOW) 08948 (typically 20 minutes face-to-face)  [] EVAL (MOD) 49082 (typically 30 minutes face-to-face)  [] EVAL (HIGH) 49298 (typically 45 minutes face-to-face)  [] RE-EVAL     [x] GR(74002) x  2  [] DRY NEEDLE 1 OR 2 MUSCLES  [] NMR (76699) x     [] DRY NEEDLE 3+ MUSCLES  [x] Manual (97602) x  1   [] TA (44371) x     [] Mech Traction (53049)  [] ES(attended) (10631)     [] ES (un) (67182):   [] VASO (90626)  [] Other:    If [facility] Please Indicate Time In/Out  CPT Code Time in Time out                                     GOALS:  Patient stated goal: relieve shoulder and buttock/leg pain  [x] Progressing: [] Met: [] Not Met: [] Adjusted  Therapist goals for Patient:   Short Term Goals: To be achieved in: 2 weeks  1. Independent in HEP and progression per patient tolerance, in order to prevent re-injury. [] Progressing: [x] Met: [] Not Met: [] Adjusted  2. Patient will have a decrease in pain to facilitate improvement in movement, function, and ADLs as indicated by Functional Deficits. [x] Progressing: [] Met: [] Not Met: [] Adjusted    Long Term Goals: To be achieved in: 8 weeks  1. Disability index score of 62 or better for Foto (shoulder) and 25 or better for Dash score (Foto). Disability index score of 10 or better for RAJESH (Foto). To assist with reaching prior level of function. (MET BOLDED ITEMS)  [x] Progressing: [] Met: [] Not Met: [] Adjusted  2.  Patient will demonstrate increased AROM to at least 160 degrees forward elevation of shoulder and good LS mobility, good hip ROM to allow for proper joint functioning as indicated by patients Functional Deficits. [] Progressing: [x] Met: [] Not Met: [] Adjusted  3. Patient will demonstrate an increase in Strength of bilateral shoulder girdle and good proximal hip and core activation to allow for proper functional mobility as indicated by patients Functional Deficits. [x] Progressing: [] Met: [] Not Met: [] Adjusted  4. Patient will return to lifting and reaching in functional planes with decreased painful arc of shoulder at  degrees without increased symptoms or restriction. [x] Progressing: [] Met: [] Not Met: [] Adjusted  5. Patient will report decreased lateral thigh pain with sitting and standing for greater than 30-60 minutes. (patient specific functional goal)    [x] Progressing: [] Met: [] Not Met: [] Adjusted    ASSESSMENT:  Patient with less overall restriction in glute max today. Hip ROM doing better with prone ER bilaterally. Addressed additional stretching for hips. Worked on more strengthening for bilat shoulders. Focused on further progression of strength in 90 degrees of elevation and endurance. Patient very limited in endurance and being able to hold arm at 90 degrees for greater than 5 sec without loss of control of arm. Challenged with updated HEP for shoulders. Treatment/Activity Tolerance:  [x] Patient tolerated treatment well [] Patient limited by fatique  [] Patient limited by pain  [] Patient limited by other medical complications  [] Other:     Overall Progression Towards Functional goals/ Treatment Progress Update:  [x] Patient is progressing as expected towards functional goals listed. [] Progression is slowed due to complexities/Impairments listed. [] Progression has been slowed due to co-morbidities.   [] Plan just implemented, too soon to assess goals progression <30days   [] Goals require adjustment due to lack of progress  [] Patient is not progressing as expected and requires additional follow up with physician  [] Other:    Prognosis for POC: [x] Good [] Fair  [] Poor    Patient requires continued skilled intervention: [x] Yes  [] No        PLAN:   [x] Continue per plan of care [] Alter current plan (see comments)  [] Plan of care initiated [] Hold pending MD visit [] Discharge    Electronically signed by: Laury Homans, PT    Note: If patient does not return for scheduled/recommended follow up visits, this note will serve as a discharge from care along with the most recent update on progress.

## 2022-08-12 ENCOUNTER — APPOINTMENT (OUTPATIENT)
Dept: PHYSICAL THERAPY | Age: 77
End: 2022-08-12
Payer: MEDICARE

## 2022-08-23 ENCOUNTER — HOSPITAL ENCOUNTER (OUTPATIENT)
Dept: PHYSICAL THERAPY | Age: 77
Setting detail: THERAPIES SERIES
Discharge: HOME | End: 2022-08-23
Payer: MEDICARE

## 2022-08-23 PROCEDURE — 97140 MANUAL THERAPY 1/> REGIONS: CPT

## 2022-08-23 NOTE — FLOWSHEET NOTE
[address]  Phone: [phone]   Fax:     [phone]     Physical Therapy Treatment Note/ Progress Report:     Date:  2022      Patient Name:  Joselo Pichardo    :  1945  MRN: [id number]  Restrictions/Precautions:    Medical/Treatment Diagnosis Information:  Diagnosis: R shoulder pain, L shoulder pain, B RTC tear, low back pain, L knee pain  Treatment Diagnosis: tear RTC M75.100, R shoulder pain M25.511, L shoulder pain M25.512, lumbar pain M54.5, L knee pain M25.562, post laminectomy syndrome P03.7  Insurance/Certification information:  PT Insurance Information: [employer]  Physician Information:    Dr Nereyda Lyn and Dr Matilda Pastor of care signed (Y/N):     Date of Patient follow up with Physician:      Progress Report: [x]  Yes  []  No     Date Range for reporting period:  Beginnin2022  Ending: -    Progress report due (10 Rx/or 30 days whichever is less):      Recertification due (POC duration/ or 90 days whichever is less):8/10/2022     Visit # Insurance Allowable Auth Needed   14 [employer] Medicare []Yes    []No     Pain level:  3-5/10   Functional Scale: Lumbar Spine (Foto 70 and RAJESH 7.5), Shoulder (Foto 61, Dash 21.7)     Date Assessed: 2022    SUBJECTIVE:  Patient reports that his back and hips are feeling more restricted overall. States that he had a follow up in [place] for the spine doc. Ordered a new MRI for his back; and will have this done at some point and then will follow more with doc at [place] clinic closer to home. Has been working on using glutes more to push off when walking. 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 OBJECTIVE: tender to palpate in bilateral bicep and bilat posterior shoulders.   Observation:   Test measurements:      ROM   Comments   Lumbar Flex 80 degrees Fingertips to toes   Lumbar Ext 20 degrees ROM LEFT RIGHT Comments   Lumbar Side Bend 18 18     Lumbar Rotation limited limited     Quadrant negative negative     Hip Flexion 110 114     Hip Abd 45 45     Hip ER 50 55     Hip IR 20 15     Hip Extension limited Limited, not as greatly as L     Knee Ext WNL WNL     Knee Flex WNL WNL     Hamstring Flex full 10% limited     Piriformis 25% limited 10% limited     Vladimir test                   HIP FLEX 4 4-    HIP ABD 4 4    HIP EXT 4- 4       ROM LEFT RIGHT Comments   Shoulder flexion 170 165 Painful arc at  degrees on R only now   Shoulder abduction 110 145    Shoulder ER 60 55     Shoulder IR T6 T5                         STRENGTH LEFT RIGHT     Shoulder flexion 3/5 3/5 With pain   Shoulder scaption 3/5 3/5  with pain   Shoulder ER 4-/5 3+/5     Shoulder IR 4/5 4/5        RESTRICTIONS/PRECAUTIONS: n/a    Exercises/Interventions:     Therapeutic Ex (54557)   Min: 0 sets/sec reps notes   Hip Flexor EOT 30 4   L   Glute max stretch (SKTC) 30 4 bilat   Piriformis stretch supine 30 4 bilat   Hip extension POT 10sec 10 bilat   Hip abd at wall 10sec 10 bilat   Bridge  2 10    Kneeling Alt Arm-Leg- EOT 0     Side lying LB rot HEP  bilat   Review of HEP 0     Hooklying TA + alt march to 90/90 0     Multifidus chop 1 10 black   TM hip extension drives 1 10 bilat   Leg press hip extension 2 10 Bilat, 10 lb/ea   Prone sh ext with scap retraction 1 10 bilat   Prone row with scap retraction 1 10 bilat   Prone scap retraction with lift 1 10 bilat   SL punch 1 10 bilat   SL ER 1 10 bilat   Shoulder press/flex/ER 1 15 Brown wand   SA punch 1 15 2 lb, bilat   Standing band row 1 15 black   Standing band sh extension 1 15 black   Side stepping ER with band 1 15 Bilat, green   Side stepping IR with band with eccentric control to ER 1 15 red   GH depression 10 10 [place] ball   Shoulder ER isometrics at wall 10 10    Ball roll on wall 2 10 Cw/ccw, white ball   Waterfalls 5 2 Very fatiguing, no wt   Lateral wall taps 1 15 red   Manual Intervention (43497) Min: 28 min      Prone hip ER and anterior hip mob  10 min bilat   Prone L hip ER MET  0 min    Prone L hip anterior hip mob  0 min    Hip belt mobs bilat hip  8 min bilat   Prone PA lumbar mobs  10 min    STM IT band + IT band mobs  0 min                      Shoulder posterior and inferior mobs, STM posterior shoulder and biceps  0 bilat                           NMR re-education (85818)   Min:      Mf Activation- re-ed      TrA Re-ed activation      Glute Max re-ed activation      Prone zandra Shane            Therapeutic Activity (29949) Min:                      Therapeutic Exercise and NMR EXR  [x] (07877) Provided verbal/tactile cueing for activities related to strengthening, flexibility, endurance, ROM  for improvements in proximal hip and core control with self care, mobility, lifting and ambulation. [x] (84573) Provided verbal/tactile cueing for activities related to improving balance, coordination, kinesthetic sense, posture, motor skill, proprioception  to assist with core control in self care, mobility, lifting, and ambulation.      Therapeutic Activities:    [] (43522 or 42348) Provided verbal/tactile cueing for activities related to improving balance, coordination, kinesthetic sense, posture, motor skill, proprioception and motor activation to allow for proper function  with self care and ADLs  [] (13006) Provided training and instruction to the patient for proper core and proximal hip recruitment and positioning with ambulation re-education     Home Exercise Program:    [x] (52369) Reviewed/Progressed HEP activities related to strengthening, flexibility, endurance, ROM of core, proximal hip and LE for functional self-care, mobility, lifting and ambulation   [] (64314) Reviewed/Progressed HEP activities related to improving balance, coordination, kinesthetic sense, posture, motor skill, proprioception of core, proximal hip and LE for self care, mobility, Not Met: [] Adjusted  2. Patient will demonstrate increased AROM to at least 160 degrees forward elevation of shoulder and good LS mobility, good hip ROM to allow for proper joint functioning as indicated by patients Functional Deficits. [] Progressing: [x] Met: [] Not Met: [] Adjusted  3. Patient will demonstrate an increase in Strength of bilateral shoulder girdle and good proximal hip and core activation to allow for proper functional mobility as indicated by patients Functional Deficits. [x] Progressing: [] Met: [] Not Met: [] Adjusted  4. Patient will return to lifting and reaching in functional planes with decreased painful arc of shoulder at  degrees without increased symptoms or restriction. [x] Progressing: [] Met: [] Not Met: [] Adjusted  5. Patient will report decreased lateral thigh pain with sitting and standing for greater than 30-60 minutes. (patient specific functional goal)    [x] Progressing: [] Met: [] Not Met: [] Adjusted    ASSESSMENT:  Patient with quite a bit of increased restriction in L L5/S1 and L hip mobility. Had to spend increased time on mobilizing joint and lumbar spine today. Reviewed exercises for shoulder. Treatment/Activity Tolerance:  [x] Patient tolerated treatment well [] Patient limited by fatique  [] Patient limited by pain  [] Patient limited by other medical complications  [] Other:     Overall Progression Towards Functional goals/ Treatment Progress Update:  [x] Patient is progressing as expected towards functional goals listed. [] Progression is slowed due to complexities/Impairments listed. [] Progression has been slowed due to co-morbidities.   [] Plan just implemented, too soon to assess goals progression <30days   [] Goals require adjustment due to lack of progress  [] Patient is not progressing as expected and requires additional follow up with physician  [] Other:    Prognosis for POC: [x] Good [] Fair  [] Poor    Patient requires continued skilled intervention: [x] Yes  [] No        PLAN:   [x] Continue per plan of care [] Alter current plan (see comments)  [] Plan of care initiated [] Hold pending MD visit [] Discharge    Electronically signed by: Bimal Lei PT    Note: If patient does not return for scheduled/recommended follow up visits, this note will serve as a discharge from care along with the most recent update on progress.

## 2022-08-25 ENCOUNTER — HOSPITAL ENCOUNTER (OUTPATIENT)
Dept: PHYSICAL THERAPY | Age: 77
Setting detail: THERAPIES SERIES
Discharge: HOME | End: 2022-08-25
Payer: MEDICARE

## 2022-08-25 PROCEDURE — 97140 MANUAL THERAPY 1/> REGIONS: CPT

## 2022-08-25 NOTE — FLOWSHEET NOTE
[address]  Phone: [phone]   Fax:     [phone]     Physical Therapy Treatment Note/ Progress Report:     Date:  2022      Patient Name:  Cody Dial    :  1945  MRN: [id number]  Restrictions/Precautions:    Medical/Treatment Diagnosis Information:  Diagnosis: R shoulder pain, L shoulder pain, B RTC tear, low back pain, L knee pain  Treatment Diagnosis: tear RTC M75.100, R shoulder pain M25.511, L shoulder pain M25.512, lumbar pain M54.5, L knee pain M25.562, post laminectomy syndrome Z20.7  Insurance/Certification information:  PT Insurance Information: [address] [place] [address]  Medicare  Physician Information:    Dr Jarocho Miguel and Dr Abraham [facility] of care signed (Y/N):     Date of Patient follow up with Physician:      Progress Report: [x]  Yes  []  No     Date Range for reporting period:  Beginnin2022  Ending: -    Progress report due (10 Rx/or 30 days whichever is less): 3/05/4569     Recertification due (POC duration/ or 90 days whichever is less):8/10/2022     Visit # Insurance Allowable Auth Needed   15 [employer] []Yes    []No     Pain level:  3-5/10   Functional Scale: Lumbar Spine (Foto 70 and RAJESH 7.5), Shoulder (Foto 61, Dash 21.7)     Date Assessed: 2022    SUBJECTIVE:  Patient reports that his back and hip are feeling better after last session. Still a bit tight though. Also having some discomfort and tightness in shoulders. OBJECTIVE: tender to palpate in bilateral bicep and bilat posterior shoulders.   Observation:   Test measurements:      ROM   Comments   Lumbar Flex 80 degrees Fingertips to toes   Lumbar Ext 20 degrees        ROM LEFT RIGHT Comments   Lumbar Side Bend 18 18     Lumbar Rotation limited limited     Quadrant negative negative     Hip Flexion 110 114     Hip Abd 45 45     Hip ER 50 55     Hip IR 20 15     Hip Extension limited Limited, not as greatly as L     Knee Ext WNL WNL     Knee Flex WNL WNL     Hamstring Flex full 10% limited     Piriformis 25% limited 10% limited     Vladimir test                   HIP FLEX 4 4-    HIP ABD 4 4    HIP EXT 4- 4       ROM LEFT RIGHT Comments   Shoulder flexion 170 165 Painful arc at  degrees on R only now   Shoulder abduction 110 145    Shoulder ER 60 55     Shoulder IR T6 T5                         STRENGTH LEFT RIGHT     Shoulder flexion 3/5 3/5 With pain   Shoulder scaption 3/5 3/5  with pain   Shoulder ER 4-/5 3+/5     Shoulder IR 4/5 4/5        RESTRICTIONS/PRECAUTIONS: n/a    Exercises/Interventions:     Therapeutic Ex (92245)   Min: 0 sets/sec reps notes   Hip Flexor EOT 30 4   L   Glute max stretch (SKTC) 30 4 bilat   Piriformis stretch supine 30 4 bilat   Hip extension POT 10sec 10 bilat   Hip abd at wall 10sec 10 bilat   Bridge  2 10    Kneeling Alt Arm-Leg- EOT 0     Side lying LB rot HEP  bilat   Review of HEP 0     Hooklying TA + alt march to 90/90 0     Multifidus chop 1 10 black   TM hip extension drives 1 10 bilat   Leg press hip extension 2 10 Bilat, 10 lb/ea   Prone sh ext with scap retraction 1 10 bilat   Prone row with scap retraction 1 10 bilat   Prone scap retraction with lift 1 10 bilat   SL punch 1 10 bilat   SL ER 1 10 bilat   Shoulder press/flex/ER 1 15 Brown wand   SA punch 1 15 2 lb, bilat   Standing band row 1 15 black   Standing band sh extension 1 15 black   Side stepping ER with band 1 15 Bilat, green   Side stepping IR with band with eccentric control to ER 1 15 red   GH depression 10 10 [place] ball   Shoulder ER isometrics at wall 10 10    Ball roll on wall 2 10 Cw/ccw, white ball   Waterfalls 5 2 Very fatiguing, no wt   Lateral wall taps 1 15 red   Manual Intervention (07705) Min: 35 min      Prone hip ER and anterior hip mob  7 min bilat   Prone L hip ER MET  0 min    Prone L hip anterior hip mob  0 min    Hip belt mobs bilat hip  10 min bilat   Prone PA lumbar mobs  8 min    STM IT band + IT band mobs  0 min                      Shoulder posterior and inferior mobs, STM posterior shoulder and biceps  10 bilat                           NMR re-education (46731)   Min:      Mf Activation- re-ed      TrA Re-ed activation      Glute Max re-ed activation      Prone zandra Shane            Therapeutic Activity (84516) Min:                      Therapeutic Exercise and NMR EXR  [x] (94518) Provided verbal/tactile cueing for activities related to strengthening, flexibility, endurance, ROM  for improvements in proximal hip and core control with self care, mobility, lifting and ambulation. [x] (23772) Provided verbal/tactile cueing for activities related to improving balance, coordination, kinesthetic sense, posture, motor skill, proprioception  to assist with core control in self care, mobility, lifting, and ambulation.      Therapeutic Activities:    [] (33848 or 84159) Provided verbal/tactile cueing for activities related to improving balance, coordination, kinesthetic sense, posture, motor skill, proprioception and motor activation to allow for proper function  with self care and ADLs  [] (84910) Provided training and instruction to the patient for proper core and proximal hip recruitment and positioning with ambulation re-education     Home Exercise Program:    [x] (90032) Reviewed/Progressed HEP activities related to strengthening, flexibility, endurance, ROM of core, proximal hip and LE for functional self-care, mobility, lifting and ambulation   [] (30622) Reviewed/Progressed HEP activities related to improving balance, coordination, kinesthetic sense, posture, motor skill, proprioception of core, proximal hip and LE for self care, mobility, lifting, and ambulation      Manual Treatments:  PROM / STM / Oscillations-Mobs:  G-I, II, III, IV (PA's, Inf., Post.)  [x] (01203) Provided manual therapy to mobilize proximal hip and LS spine soft tissue/joints for the purpose of modulating pain, promoting relaxation,  increasing ROM, reducing/eliminating soft tissue swelling/inflammation/restriction, improving soft tissue extensibility and allowing for proper ROM for normal function with self care, mobility, lifting and ambulation. Modalities:       Charges:  Timed Code Treatment Minutes: 35   Total Treatment Minutes: 35     [] EVAL (LOW) 68948 (typically 20 minutes face-to-face)  [] EVAL (MOD) 12849 (typically 30 minutes face-to-face)  [] EVAL (HIGH) 45852 (typically 45 minutes face-to-face)  [] RE-EVAL     [] IC(68662) x    [] DRY NEEDLE 1 OR 2 MUSCLES  [] NMR (14744) x     [] DRY NEEDLE 3+ MUSCLES  [x] Manual (36839) x  2   [] TA (91346) x     [] Mech Traction (57474)  [] ES(attended) (34768)     [] ES (un) (55994):   [] VASO (62798)  [] Other:    If BWC Please Indicate Time In/Out  CPT Code Time in Time out                                     GOALS:  Patient stated goal: relieve shoulder and buttock/leg pain  [x] Progressing: [] Met: [] Not Met: [] Adjusted  Therapist goals for Patient:   Short Term Goals: To be achieved in: 2 weeks  1. Independent in HEP and progression per patient tolerance, in order to prevent re-injury. [] Progressing: [x] Met: [] Not Met: [] Adjusted  2. Patient will have a decrease in pain to facilitate improvement in movement, function, and ADLs as indicated by Functional Deficits. [x] Progressing: [] Met: [] Not Met: [] Adjusted    Long Term Goals: To be achieved in: 8 weeks  1. Disability index score of 62 or better for Foto (shoulder) and 25 or better for Dash score (Foto). Disability index score of 10 or better for RAJESH (Foto). To assist with reaching prior level of function. (MET BOLDED ITEMS)  [x] Progressing: [] Met: [] Not Met: [] Adjusted  2. Patient will demonstrate increased AROM to at least 160 degrees forward elevation of shoulder and good LS mobility, good hip ROM to allow for proper joint functioning as indicated by patients Functional Deficits.    [] Progressing: [x] Met: [] Not Met: [] Adjusted  3. Patient will demonstrate an increase in Strength of bilateral shoulder girdle and good proximal hip and core activation to allow for proper functional mobility as indicated by patients Functional Deficits. [x] Progressing: [] Met: [] Not Met: [] Adjusted  4. Patient will return to lifting and reaching in functional planes with decreased painful arc of shoulder at  degrees without increased symptoms or restriction. [x] Progressing: [] Met: [] Not Met: [] Adjusted  5. Patient will report decreased lateral thigh pain with sitting and standing for greater than 30-60 minutes. (patient specific functional goal)    [x] Progressing: [] Met: [] Not Met: [] Adjusted    ASSESSMENT:  Patient with less restriction in lumbar spine and hips today. Still quite restricted in bilateral hip IR which improved considerably with belt mobs. Patient tight in bilateral deltoids with significant TP on L shoulder. Patient with good release and improved overall mobility of tissue at conclusion. Patient will be OOT for next 2 weeks- will follow upon return. Treatment/Activity Tolerance:  [x] Patient tolerated treatment well [] Patient limited by fatique  [] Patient limited by pain  [] Patient limited by other medical complications  [] Other:     Overall Progression Towards Functional goals/ Treatment Progress Update:  [x] Patient is progressing as expected towards functional goals listed. [] Progression is slowed due to complexities/Impairments listed. [] Progression has been slowed due to co-morbidities.   [] Plan just implemented, too soon to assess goals progression <30days   [] Goals require adjustment due to lack of progress  [] Patient is not progressing as expected and requires additional follow up with physician  [] Other:    Prognosis for POC: [x] Good [] Fair  [] Poor    Patient requires continued skilled intervention: [x] Yes  [] No        PLAN:   [x] Continue per plan of care [] Alter current plan (see comments)  [] Plan of care initiated [] Hold pending MD visit [] Discharge    Electronically signed by: Bimal Lei PT    Note: If patient does not return for scheduled/recommended follow up visits, this note will serve as a discharge from care along with the most recent update on progress.

## 2022-09-08 ENCOUNTER — HOSPITAL ENCOUNTER (OUTPATIENT)
Dept: PHYSICAL THERAPY | Age: 77
Setting detail: THERAPIES SERIES
Discharge: HOME | End: 2022-09-08
Payer: MEDICARE

## 2022-09-08 PROCEDURE — 97140 MANUAL THERAPY 1/> REGIONS: CPT

## 2022-09-08 NOTE — FLOWSHEET NOTE
[address]  Phone: [phone]   Fax:     [phone]     Physical Therapy Treatment Note/ Progress Report:     Date:  2022      Patient Name:  Lucila Good    :  1945  MRN: [id number]  Restrictions/Precautions:    Medical/Treatment Diagnosis Information:  Diagnosis: R shoulder pain, L shoulder pain, B RTC tear, low back pain, L knee pain  Treatment Diagnosis: tear RTC M75.100, R shoulder pain M25.511, L shoulder pain M25.512, lumbar pain M54.5, L knee pain M25.562, post laminectomy syndrome D77.8  Insurance/Certification information:  PT Insurance Information: [address] Medicare  Physician Information:    Dr Mine Johns and Dr Alessandro De Leon of care signed (Y/N):     Date of Patient follow up with Physician:      Progress Report: [x]  Yes  []  No     Date Range for reporting period:  Beginnin2022  Ending: -    Progress report due (10 Rx/or 30 days whichever is less):      Recertification due (POC duration/ or 90 days whichever is less):8/10/2022     Visit # Insurance Allowable Auth Needed   16 [employer] Medicare []Yes    []No     Pain level:  3-5/10   Functional Scale: Lumbar Spine (Foto 70 and RAJESH 7.5), Shoulder (Foto 61, Dash 21.7)     Date Assessed: 2022    SUBJECTIVE:  Patient reports that his back and hip are feeling stoved up as he was in [place] at his cabin for the past 2 weeks and had a lot of driving which was a bit irritating. Reports that he will be having an MRI for back/hip area next week and then will be having a follow up with a doc at [facility], but in [place]. Would like to have back and hips worked on today. OBJECTIVE: tender to palpate in bilateral bicep and bilat posterior shoulders.   Observation:   Test measurements:      ROM   Comments   Lumbar Flex 80 degrees Fingertips to toes   Lumbar Ext 20 degrees        ROM LEFT RIGHT Comments   Lumbar Side Bend 18 18 Lumbar Rotation limited limited     Quadrant negative negative     Hip Flexion 110 114     Hip Abd 45 45     Hip ER 50 55     Hip IR 20 15     Hip Extension limited Limited, not as greatly as L     Knee Ext WNL WNL     Knee Flex WNL WNL     Hamstring Flex full 10% limited     Piriformis 25% limited 10% limited     Vladimir test                   HIP FLEX 4 4-    HIP ABD 4 4    HIP EXT 4- 4       ROM LEFT RIGHT Comments   Shoulder flexion 170 165 Painful arc at  degrees on R only now   Shoulder abduction 110 145    Shoulder ER 60 55     Shoulder IR T6 T5                         STRENGTH LEFT RIGHT     Shoulder flexion 3/5 3/5 With pain   Shoulder scaption 3/5 3/5  with pain   Shoulder ER 4-/5 3+/5     Shoulder IR 4/5 4/5        RESTRICTIONS/PRECAUTIONS: n/a    Exercises/Interventions:     Therapeutic Ex (80503)   Min: 0 sets/sec reps notes   Hip Flexor EOT 30 4   L   Glute max stretch (SKTC) 30 4 bilat   Piriformis stretch supine 30 4 bilat   Hip extension POT 10sec 10 bilat   Hip abd at wall 10sec 10 bilat   Bridge  2 10    Kneeling Alt Arm-Leg- EOT 0     Side lying LB rot HEP  bilat   Review of HEP 0     Hooklying TA + alt march to 90/90 0     Multifidus chop 1 10 black   TM hip extension drives 1 10 bilat   Leg press hip extension 2 10 Bilat, 10 lb/ea   Prone sh ext with scap retraction 1 10 bilat   Prone row with scap retraction 1 10 bilat   Prone scap retraction with lift 1 10 bilat   SL punch 1 10 bilat   SL ER 1 10 bilat   Shoulder press/flex/ER 1 15 Brown wand   SA punch 1 15 2 lb, bilat   Standing band row 1 15 black   Standing band sh extension 1 15 black   Side stepping ER with band 1 15 Bilat, green   Side stepping IR with band with eccentric control to ER 1 15 red   GH depression 10 10 [place] ball   Shoulder ER isometrics at wall 10 10    Ball roll on wall 2 10 Cw/ccw, white ball   Waterfalls 5 2 Very fatiguing, no wt   Lateral wall taps 1 15 red   Manual Intervention (87443) Min: 28 min      Prone hip ER and anterior hip mob  10 min bilat   Prone L hip ER MET  0 min    Prone L hip anterior hip mob  0 min    Hip belt mobs bilat hip  10 min bilat   Prone PA lumbar mobs  8 min    STM IT band + IT band mobs  0 min                      Shoulder posterior and inferior mobs, STM posterior shoulder and biceps  0 bilat                           NMR re-education (30852)   Min:      Mf Activation- re-ed      TrA Re-ed activation      Glute Max re-ed activation      Prone zandra Shane            Therapeutic Activity (13194) Min:                      Therapeutic Exercise and NMR EXR  [x] (38648) Provided verbal/tactile cueing for activities related to strengthening, flexibility, endurance, ROM  for improvements in proximal hip and core control with self care, mobility, lifting and ambulation. [x] (69545) Provided verbal/tactile cueing for activities related to improving balance, coordination, kinesthetic sense, posture, motor skill, proprioception  to assist with core control in self care, mobility, lifting, and ambulation.      Therapeutic Activities:    [] (31406 or 04770) Provided verbal/tactile cueing for activities related to improving balance, coordination, kinesthetic sense, posture, motor skill, proprioception and motor activation to allow for proper function  with self care and ADLs  [] (32450) Provided training and instruction to the patient for proper core and proximal hip recruitment and positioning with ambulation re-education     Home Exercise Program:    [x] (67148) Reviewed/Progressed HEP activities related to strengthening, flexibility, endurance, ROM of core, proximal hip and LE for functional self-care, mobility, lifting and ambulation   [] (21925) Reviewed/Progressed HEP activities related to improving balance, coordination, kinesthetic sense, posture, motor skill, proprioception of core, proximal hip and LE for self care, mobility, lifting, and ambulation      Manual Treatments:  PROM / STM / Oscillations-Mobs:  G-I, II, III, IV (PA's, Inf., Post.)  [x] (05532) Provided manual therapy to mobilize proximal hip and LS spine soft tissue/joints for the purpose of modulating pain, promoting relaxation,  increasing ROM, reducing/eliminating soft tissue swelling/inflammation/restriction, improving soft tissue extensibility and allowing for proper ROM for normal function with self care, mobility, lifting and ambulation. Modalities:       Charges:  Timed Code Treatment Minutes: 28   Total Treatment Minutes: 35     [] EVAL (LOW) 20068 (typically 20 minutes face-to-face)  [] EVAL (MOD) 71210 (typically 30 minutes face-to-face)  [] EVAL (HIGH) 86756 (typically 45 minutes face-to-face)  [] RE-EVAL     [] ZH(38974) x    [] DRY NEEDLE 1 OR 2 MUSCLES  [] NMR (58149) x     [] DRY NEEDLE 3+ MUSCLES  [x] Manual (69592) x  2   [] TA (49791) x     [] Mech Traction (61844)  [] ES(attended) (12693)     [] ES (un) (36825):   [] VASO (55482)  [] Other:    If [facility] Please Indicate Time In/Out  CPT Code Time in Time out                                     GOALS:  Patient stated goal: relieve shoulder and buttock/leg pain  [x] Progressing: [] Met: [] Not Met: [] Adjusted  Therapist goals for Patient:   Short Term Goals: To be achieved in: 2 weeks  1. Independent in HEP and progression per patient tolerance, in order to prevent re-injury. [] Progressing: [x] Met: [] Not Met: [] Adjusted  2. Patient will have a decrease in pain to facilitate improvement in movement, function, and ADLs as indicated by Functional Deficits. [x] Progressing: [] Met: [] Not Met: [] Adjusted    Long Term Goals: To be achieved in: 8 weeks  1. Disability index score of 62 or better for Foto (shoulder) and 25 or better for Dash score (Foto). Disability index score of 10 or better for RAJESH (Foto). To assist with reaching prior level of function. (MET BOLDED ITEMS)  [x] Progressing: [] Met: [] Not Met: [] Adjusted  2.  Patient will demonstrate increased AROM to at least 160 degrees forward elevation of shoulder and good LS mobility, good hip ROM to allow for proper joint functioning as indicated by patients Functional Deficits. [] Progressing: [x] Met: [] Not Met: [] Adjusted  3. Patient will demonstrate an increase in Strength of bilateral shoulder girdle and good proximal hip and core activation to allow for proper functional mobility as indicated by patients Functional Deficits. [x] Progressing: [] Met: [] Not Met: [] Adjusted  4. Patient will return to lifting and reaching in functional planes with decreased painful arc of shoulder at  degrees without increased symptoms or restriction. [x] Progressing: [] Met: [] Not Met: [] Adjusted  5. Patient will report decreased lateral thigh pain with sitting and standing for greater than 30-60 minutes. (patient specific functional goal)    [x] Progressing: [] Met: [] Not Met: [] Adjusted    ASSESSMENT:  Patient with restriction in lumbar spine and hips today. Still quite restricted in bilateral hip IR which improved considerably with belt mobs. Patient with good release and improved overall mobility of tissue at conclusion. Will plan on doing update next visit as patient will be having MD follow ups at that time. Treatment/Activity Tolerance:  [x] Patient tolerated treatment well [] Patient limited by fatique  [] Patient limited by pain  [] Patient limited by other medical complications  [] Other:     Overall Progression Towards Functional goals/ Treatment Progress Update:  [x] Patient is progressing as expected towards functional goals listed. [] Progression is slowed due to complexities/Impairments listed. [] Progression has been slowed due to co-morbidities.   [] Plan just implemented, too soon to assess goals progression <30days   [] Goals require adjustment due to lack of progress  [] Patient is not progressing as expected and requires additional follow up with physician  [] Other:    Prognosis for POC: [x] Good [] Fair  [] Poor    Patient requires continued skilled intervention: [x] Yes  [] No        PLAN:   [x] Continue per plan of care [] Alter current plan (see comments)  [] Plan of care initiated [] Hold pending MD visit [] Discharge    Electronically signed by: Neil Lentz PT    Note: If patient does not return for scheduled/recommended follow up visits, this note will serve as a discharge from care along with the most recent update on progress.

## 2022-09-13 ENCOUNTER — HOSPITAL ENCOUNTER (OUTPATIENT)
Dept: PHYSICAL THERAPY | Age: 77
Setting detail: THERAPIES SERIES
Discharge: HOME | End: 2022-09-13
Payer: MEDICARE

## 2022-09-13 PROCEDURE — 97140 MANUAL THERAPY 1/> REGIONS: CPT

## 2022-09-13 NOTE — PLAN OF CARE
lumbar pain M54.5, L knee pain M25.562, post laminectomy syndrome M29.1  Insurance/Certification information:  PT Insurance Information: [address] Medicare  Physician Information:    Dr Hitesh Smith and Dr [place] Thomas of care signed (Y/N):     Date of Patient follow up with Physician:      Progress Report: [x]  Yes  []  No     Date Range for reporting period:  Beginnin2022  Ending: -    Progress report due (10 Rx/or 30 days whichever is less): 5324     Recertification due (POC duration/ or 90 days whichever is less):8/10/2022     Visit # Insurance Allowable Auth Needed   17 A[employer] Medicare []Yes    []No     Pain level:  3/10 back and shoulders   Functional Scale: Lumbar Spine (Foto 70 and RAJESH 7.5), Shoulder (Foto 61, Dash 21.7)- 2022    Lumbar spine (Foto 63, RAJESH 14%), Shoulder (Foto 70, Dash 11%)-2022        SUBJECTIVE:  Patient reports that he will be going to see other MD next week so will hold off on therapy until after he has these appointments. Reports that he can do more with the shoulders with getting things up to the cupboard and lifting. He feels most improved with the L buttock pain. OBJECTIVE: tender to palpate in bilateral bicep and bilat posterior shoulders.   Observation:   Test measurements:      ROM   Comments   Lumbar Flex 90 degrees Fingertips to toes   Lumbar Ext 25 degrees        ROM LEFT RIGHT Comments   Lumbar Side Bend 18 18     Lumbar Rotation limited limited     Quadrant negative negative     Hip Flexion 115 115     Hip Abd 45 45     Hip ER 50 55     Hip IR 20 15     Hip Extension limited Limited, not as greatly as L     Knee Ext WNL WNL     Knee Flex WNL WNL     Hamstring Flex full 10% limited     Piriformis 10% limited 10% limited     Vladimir test                   HIP FLEX 4+ 5    HIP ABD 5 4    HIP EXT 4+ 4+       ROM LEFT RIGHT Comments   Shoulder flexion 170 170    Shoulder abduction 140 155    Shoulder ER 60 65     Shoulder IR T4 T5 STRENGTH LEFT RIGHT     Shoulder flexion 3/5 3/5 With pain   Shoulder scaption 3/5 3/5  with pain   Shoulder ER 4-/5 4/5     Shoulder IR 4/5 4/5        RESTRICTIONS/PRECAUTIONS: n/a    Exercises/Interventions:     Therapeutic Ex (72907)   Min: 0 sets/sec reps notes   Hip Flexor EOT 30 4   L   Glute max stretch (SKTC) 30 4 bilat   Piriformis stretch supine 30 4 bilat   Hip extension POT 10sec 10 bilat   Hip abd at wall 10sec 10 bilat   Bridge  2 10    Kneeling Alt Arm-Leg- EOT 0     Side lying LB rot HEP  bilat   Review of HEP 0     Hooklying TA + alt march to 90/90 0     Multifidus chop 1 10 black   TM hip extension drives 1 10 bilat   Leg press hip extension 2 10 Bilat, 10 lb/ea   Prone sh ext with scap retraction 1 10 bilat   Prone row with scap retraction 1 10 bilat   Prone scap retraction with lift 1 10 bilat   SL punch 1 10 bilat   SL ER 1 10 bilat   Shoulder press/flex/ER 1 15 Brown wand   SA punch 1 15 2 lb, bilat   Standing band row 1 15 black   Standing band sh extension 1 15 black   Side stepping ER with band 1 15 Bilat, green   Side stepping IR with band with eccentric control to ER 1 15 red   GH depression 10 10 [place] ball   Shoulder ER isometrics at wall 10 10    Ball roll on wall 2 10 Cw/ccw, white ball   Waterfalls 5 2 Very fatiguing, no wt   Lateral wall taps 1 15 red   Manual Intervention (90889) Min: 28 min      Prone hip ER and anterior hip mob  10 min bilat   Prone L hip ER MET  0 min    Prone L hip anterior hip mob  0 min    Hip belt mobs bilat hip  10 min bilat   Prone PA lumbar mobs  8 min    STM IT band + IT band mobs  0 min                      Shoulder posterior and inferior mobs, STM posterior shoulder and biceps  0 bilat                           NMR re-education (09960)   Min:      Mf Activation- re-ed      TrA Re-ed activation      Glute Max re-ed activation      Prone froggers      [place]            Therapeutic Activity (18961) Min:                      Therapeutic Exercise and NMR EXR  [x] (55503) Provided verbal/tactile cueing for activities related to strengthening, flexibility, endurance, ROM  for improvements in proximal hip and core control with self care, mobility, lifting and ambulation. [x] (16798) Provided verbal/tactile cueing for activities related to improving balance, coordination, kinesthetic sense, posture, motor skill, proprioception  to assist with core control in self care, mobility, lifting, and ambulation. Therapeutic Activities:    [] (19882 or 55150) Provided verbal/tactile cueing for activities related to improving balance, coordination, kinesthetic sense, posture, motor skill, proprioception and motor activation to allow for proper function  with self care and ADLs  [] (03860) Provided training and instruction to the patient for proper core and proximal hip recruitment and positioning with ambulation re-education     Home Exercise Program:    [x] (40817) Reviewed/Progressed HEP activities related to strengthening, flexibility, endurance, ROM of core, proximal hip and LE for functional self-care, mobility, lifting and ambulation   [] (62459) Reviewed/Progressed HEP activities related to improving balance, coordination, kinesthetic sense, posture, motor skill, proprioception of core, proximal hip and LE for self care, mobility, lifting, and ambulation      Manual Treatments:  PROM / STM / Oscillations-Mobs:  G-I, II, III, IV (PA's, Inf., Post.)  [x] (37228) Provided manual therapy to mobilize proximal hip and LS spine soft tissue/joints for the purpose of modulating pain, promoting relaxation,  increasing ROM, reducing/eliminating soft tissue swelling/inflammation/restriction, improving soft tissue extensibility and allowing for proper ROM for normal function with self care, mobility, lifting and ambulation.      Modalities:       Charges:  Timed Code Treatment Minutes: 28   Total Treatment Minutes: 35     [] EVAL (LOW) 14128 (typically 20 minutes face-to-face)  [] EVAL (MOD) 51836 (typically 30 minutes face-to-face)  [] EVAL (HIGH) 83892 (typically 45 minutes face-to-face)  [] RE-EVAL     [] PK(05541) x    [] DRY NEEDLE 1 OR 2 MUSCLES  [] NMR (29863) x     [] DRY NEEDLE 3+ MUSCLES  [x] Manual (13686) x  2   [] TA (53449) x     [] Mech Traction (13711)  [] ES(attended) (79042)     [] ES (un) (52936):   [] VASO (29726)  [] Other:    If [facility] Please Indicate Time In/Out  CPT Code Time in Time out                                     GOALS:  Patient stated goal: relieve shoulder and buttock/leg pain  [x] Progressing: [] Met: [] Not Met: [] Adjusted  Therapist goals for Patient:   Short Term Goals: To be achieved in: 2 weeks  1. Independent in HEP and progression per patient tolerance, in order to prevent re-injury. [] Progressing: [x] Met: [] Not Met: [] Adjusted  2. Patient will have a decrease in pain to facilitate improvement in movement, function, and ADLs as indicated by Functional Deficits. [] Progressing: [x] Met: [] Not Met: [] Adjusted    Long Term Goals: To be achieved in: 8 weeks  1. Disability index score of 62 or better for Foto (shoulder) and 25 or better for Dash score (Foto). Disability index score of 10 or better for RAJESH (Foto). To assist with reaching prior level of function. [x] Progressing: [] Met: [] Not Met: [] Adjusted (MET BOLDED ITEMS)  2. Patient will demonstrate increased AROM to at least 160 degrees forward elevation of shoulder and good LS mobility, good hip ROM to allow for proper joint functioning as indicated by patients Functional Deficits. [] Progressing: [x] Met: [] Not Met: [] Adjusted  3. Patient will demonstrate an increase in Strength of bilateral shoulder girdle and good proximal hip and core activation to allow for proper functional mobility as indicated by patients Functional Deficits. [x] Progressing: [] Met: [] Not Met: [] Adjusted  4.  Patient will return to lifting and reaching in functional planes with decreased painful arc of shoulder at  degrees without increased symptoms or restriction. [] Progressing: [x] Met: [] Not Met: [] Adjusted  5. Patient will report decreased lateral thigh pain with sitting and standing for greater than 30-60 minutes. (patient specific functional goal)    [] Progressing: [] Met: [x] Not Met: [] Adjusted    ASSESSMENT:  Patient has been seen for additional 8 visits of physical therapy to address lumbar spine and bilateral shoulders. Patient has made good improvement in regards to L buttock pain; however continues with lateral thigh pain L>R and L knee pain. Patient has made good improvement in overall shoulder ROM with improved OH elevation and little to no issues with painful arc. Strength however has not improved significantly since starting with PT. Patient has comprehensive HEP to continue to work with for l/spine, hips and shoulders. Patient is to have further follow up with Dr Rigo Lim for l/spine. Patient on hold from PT at this time and will return if doc requests for patient to have further PT services. Treatment/Activity Tolerance:  [x] Patient tolerated treatment well [] Patient limited by fatique  [] Patient limited by pain  [] Patient limited by other medical complications  [] Other:     Overall Progression Towards Functional goals/ Treatment Progress Update:  [x] Patient is progressing as expected towards functional goals listed. [] Progression is slowed due to complexities/Impairments listed. [] Progression has been slowed due to co-morbidities.   [] Plan just implemented, too soon to assess goals progression <30days   [] Goals require adjustment due to lack of progress  [] Patient is not progressing as expected and requires additional follow up with physician  [] Other:    Prognosis for POC: [x] Good [] Fair  [] Poor    Patient requires continued skilled intervention: [x] Yes  [] No        PLAN:   [] Continue per plan of care [] Alter current plan (see comments)  [] Plan of care initiated [x] Hold pending MD visit [] Discharge    Electronically signed by: Dhaval Morocho PT    Note: If patient does not return for scheduled/recommended follow up visits, this note will serve as a discharge from care along with the most recent update on progress.

## 2022-10-04 ENCOUNTER — HOSPITAL ENCOUNTER (OUTPATIENT)
Dept: PHYSICAL THERAPY | Age: 77
Setting detail: THERAPIES SERIES
Discharge: HOME | End: 2022-10-04
Payer: MEDICARE

## 2022-10-04 PROCEDURE — 97140 MANUAL THERAPY 1/> REGIONS: CPT

## 2022-10-04 PROCEDURE — 97110 THERAPEUTIC EXERCISES: CPT

## 2022-10-04 NOTE — FLOWSHEET NOTE
[address]  Phone: [phone]   Fax:     [phone]         Physical Therapy Treatment Note/ Progress Report:     Date:  10/4/2022      Patient Name:  Sheba Orozco    :  1945  MRN: [id number]  Restrictions/Precautions:    Medical/Treatment Diagnosis Information:  Diagnosis: R shoulder pain, L shoulder pain, B RTC tear, low back pain, L knee pain  Treatment Diagnosis: tear RTC M75.100, R shoulder pain M25.511, L shoulder pain M25.512, lumbar pain M54.5, L knee pain M25.562, post laminectomy syndrome I72.6  Insurance/Certification information:  PT Insurance Information: [address] [place] [address]  Medicare  Physician Information:    Dr Louisa Mastres and Dr Imtiaz Woodall of care signed (Y/N):     Date of Patient follow up with Physician:      Progress Report: [x]  Yes  []  No     Date Range for reporting period:  Beginnin2022  Ending: -    Progress report due (10 Rx/or 30 days whichever is less): 9356     Recertification due (POC duration/ or 90 days whichever is less):8/10/2022     Visit # Insurance Allowable Auth Needed   18 [employer] []Yes    []No     Pain level:  310 back and shoulders   Functional Scale: Lumbar Spine (Foto 70 and RAJESH 7.5), Shoulder (Foto 61, Dash 21.7)- 2022    Lumbar spine (Foto 63, RAJESH 14%), Shoulder (Foto 70, Dash 11%)-2022        SUBJECTIVE:  Patient reports that he had injection at R and L L4 segment last Thursday and had increased jerking of L leg during the injection as the doc had a hard time getting the needle in. Notes that R side feels ok and L side is still sore and aching. Had nerve pain that shot down to ankle. Some better, but still feeling into glute. OBJECTIVE: tender to palpate in bilateral bicep and bilat posterior shoulders.   Observation:   Test measurements:      ROM   Comments   Lumbar Flex 90 degrees Fingertips to toes   Lumbar Ext 25 degrees hip ER and anterior hip mob  10 min bilat   Prone L hip ER MET  0 min    Prone L hip anterior hip mob  5 min    Hip belt mobs bilat hip  0 min bilat   Prone PA lumbar mobs  10 min    STM IT band + IT band mobs  0 min    STM lumbar paraspinals  10 min                Shoulder posterior and inferior mobs, STM posterior shoulder and biceps  0 bilat                           NMR re-education (70531)   Min:      Mf Activation- re-ed      TrA Re-ed activation      Glute Max re-ed activation      Prone zandra Shane            Therapeutic Activity (84127) Min:                      Therapeutic Exercise and NMR EXR  [x] (72514) Provided verbal/tactile cueing for activities related to strengthening, flexibility, endurance, ROM  for improvements in proximal hip and core control with self care, mobility, lifting and ambulation. [x] (71364) Provided verbal/tactile cueing for activities related to improving balance, coordination, kinesthetic sense, posture, motor skill, proprioception  to assist with core control in self care, mobility, lifting, and ambulation.      Therapeutic Activities:    [] (81939 or 24489) Provided verbal/tactile cueing for activities related to improving balance, coordination, kinesthetic sense, posture, motor skill, proprioception and motor activation to allow for proper function  with self care and ADLs  [] (60130) Provided training and instruction to the patient for proper core and proximal hip recruitment and positioning with ambulation re-education     Home Exercise Program:    [x] (01595) Reviewed/Progressed HEP activities related to strengthening, flexibility, endurance, ROM of core, proximal hip and LE for functional self-care, mobility, lifting and ambulation   [] (89221) Reviewed/Progressed HEP activities related to improving balance, coordination, kinesthetic sense, posture, motor skill, proprioception of core, proximal hip and LE for self care, mobility, lifting, and ambulation Manual Treatments:  PROM / STM / Oscillations-Mobs:  G-I, II, III, IV (PA's, Inf., Post.)  [x] (33775) Provided manual therapy to mobilize proximal hip and LS spine soft tissue/joints for the purpose of modulating pain, promoting relaxation,  increasing ROM, reducing/eliminating soft tissue swelling/inflammation/restriction, improving soft tissue extensibility and allowing for proper ROM for normal function with self care, mobility, lifting and ambulation. Modalities:       Charges:  Timed Code Treatment Minutes: 45   Total Treatment Minutes: 45     [] EVAL (LOW) 43082 (typically 20 minutes face-to-face)  [] EVAL (MOD) 55410 (typically 30 minutes face-to-face)  [] EVAL (HIGH) 28298 (typically 45 minutes face-to-face)  [] RE-EVAL     [x] HE(01071) x  1  [] DRY NEEDLE 1 OR 2 MUSCLES  [] NMR (23067) x     [] DRY NEEDLE 3+ MUSCLES  [x] Manual (19219) x  2   [] TA (19159) x     [] Mech Traction (66731)  [] ES(attended) (20230)     [] ES (un) (30072):   [] VASO (95510)  [] Other:    If [facility] Please Indicate Time In/Out  CPT Code Time in Time out                                     GOALS:  Patient stated goal: relieve shoulder and buttock/leg pain  [x] Progressing: [] Met: [] Not Met: [] Adjusted  Therapist goals for Patient:   Short Term Goals: To be achieved in: 2 weeks  1. Independent in HEP and progression per patient tolerance, in order to prevent re-injury. [] Progressing: [x] Met: [] Not Met: [] Adjusted  2. Patient will have a decrease in pain to facilitate improvement in movement, function, and ADLs as indicated by Functional Deficits. [] Progressing: [x] Met: [] Not Met: [] Adjusted    Long Term Goals: To be achieved in: 8 weeks  1. Disability index score of 62 or better for Foto (shoulder) and 25 or better for Dash score (Foto). Disability index score of 10 or better for RAJESH (Foto). To assist with reaching prior level of function. [x] Progressing: [] Met: [] Not Met: [] Adjusted (MET BOLDED ITEMS)  2. Patient will demonstrate increased AROM to at least 160 degrees forward elevation of shoulder and good LS mobility, good hip ROM to allow for proper joint functioning as indicated by patients Functional Deficits. [] Progressing: [x] Met: [] Not Met: [] Adjusted  3. Patient will demonstrate an increase in Strength of bilateral shoulder girdle and good proximal hip and core activation to allow for proper functional mobility as indicated by patients Functional Deficits. [x] Progressing: [] Met: [] Not Met: [] Adjusted  4. Patient will return to lifting and reaching in functional planes with decreased painful arc of shoulder at  degrees without increased symptoms or restriction. [] Progressing: [x] Met: [] Not Met: [] Adjusted  5. Patient will report decreased lateral thigh pain with sitting and standing for greater than 30-60 minutes. (patient specific functional goal)    [] Progressing: [] Met: [x] Not Met: [] Adjusted    ASSESSMENT:  Patient very tight and restricted in L paraspinals and glute, as well as restricted in hip ER in prone. Good overall tolerance to all manual therapy with good reduction in soft tissue restriction and improvement of joint mobility. Good tolerance to hip flexibility as well at conclusion. Reporting less overall pain and nerve feeling into back and glutes at conclusion. Will see patient 1 more time prior to patient going OOT. Treatment/Activity Tolerance:  [x] Patient tolerated treatment well [] Patient limited by fatique  [] Patient limited by pain  [] Patient limited by other medical complications  [] Other:     Overall Progression Towards Functional goals/ Treatment Progress Update:  [x] Patient is progressing as expected towards functional goals listed. [] Progression is slowed due to complexities/Impairments listed. [] Progression has been slowed due to co-morbidities.   [] Plan just implemented, too soon to assess goals progression <30days   [] Goals require adjustment due to lack of progress  [] Patient is not progressing as expected and requires additional follow up with physician  [] Other:    Prognosis for POC: [x] Good [] Fair  [] Poor    Patient requires continued skilled intervention: [x] Yes  [] No        PLAN:   [] Continue per plan of care [] Alter current plan (see comments)  [] Plan of care initiated [x] Hold pending MD visit [] Discharge    Electronically signed by: Eve Peng PT    Note: If patient does not return for scheduled/recommended follow up visits, this note will serve as a discharge from care along with the most recent update on progress.

## 2022-10-10 ENCOUNTER — HOSPITAL ENCOUNTER (OUTPATIENT)
Dept: PHYSICAL THERAPY | Age: 77
Setting detail: THERAPIES SERIES
Discharge: HOME | End: 2022-10-10
Payer: MEDICARE

## 2022-10-10 PROCEDURE — 97140 MANUAL THERAPY 1/> REGIONS: CPT

## 2022-10-10 PROCEDURE — 97110 THERAPEUTIC EXERCISES: CPT

## 2022-10-10 NOTE — FLOWSHEET NOTE
[address]  Phone: [phone]   Fax:     [phone]         Physical Therapy Treatment Note/ Progress Report:     Date:  10/10/2022      Patient Name:  Janki Pardo    :  1945  MRN: [id number]  Restrictions/Precautions:    Medical/Treatment Diagnosis Information:  Diagnosis: R shoulder pain, L shoulder pain, B RTC tear, low back pain, L knee pain  Treatment Diagnosis: tear RTC M75.100, R shoulder pain M25.511, L shoulder pain M25.512, lumbar pain M54.5, L knee pain M25.562, post laminectomy syndrome O88.0  Insurance/Certification information:  PT Insurance Information: [employer]  Physician Information:    Dr Aspen Donato and Dr Blessing Grigsby of care signed (Y/N):     Date of Patient follow up with Physician:      Progress Report: [x]  Yes  []  No     Date Range for reporting period:  Beginnin2022  Ending: -    Progress report due (10 Rx/or 30 days whichever is less):      Recertification due (POC duration/ or 90 days whichever is less):8/10/2022     Visit # Insurance Allowable Auth Needed   19 [employer] Medicare []Yes    []No     Pain level:  3/10 back and shoulders   Functional Scale: Lumbar Spine (Foto 70 and RAJESH 7.5), Shoulder (Foto 61, Dash 21.7)- 2022    Lumbar spine (Foto 63, RAJESH 14%), Shoulder (Foto 70, Dash 11%)-2022        SUBJECTIVE:  Patient reports that he felt much better after leaving last session. Feels like back is back to what he normally feels. Not feeling the nerve pain until closer to end of day. Not having issue with sleeping at nigh. Is unable to discern if he has had any improvement from the injection though. Will not have follow up until beginning of Nov. Will be leaving to go OOT for the next 10 days or so. OBJECTIVE: tender to palpate in bilateral bicep and bilat posterior shoulders.   Observation:   Test measurements:      ROM   Comments Lumbar Flex 90 degrees Fingertips to toes   Lumbar Ext 25 degrees        ROM LEFT RIGHT Comments   Lumbar Side Bend 18 18     Lumbar Rotation limited limited     Quadrant negative negative     Hip Flexion 115 115     Hip Abd 45 45     Hip ER 50 55     Hip IR 20 15     Hip Extension limited Limited, not as greatly as L     Knee Ext WNL WNL     Knee Flex WNL WNL     Hamstring Flex full 10% limited     Piriformis 10% limited 10% limited     Vladimir test                   HIP FLEX 4+ 5    HIP ABD 5 4    HIP EXT 4+ 4+       ROM LEFT RIGHT Comments   Shoulder flexion 170 170    Shoulder abduction 140 155    Shoulder ER 60 65     Shoulder IR T4 T5                         STRENGTH LEFT RIGHT     Shoulder flexion 3/5 3/5 With pain   Shoulder scaption 3/5 3/5  with pain   Shoulder ER 4-/5 4/5     Shoulder IR 4/5 4/5        RESTRICTIONS/PRECAUTIONS: n/a    Exercises/Interventions:     Therapeutic Ex (87512)   Min: 10 sets/sec reps notes   Hip Flexor EOT 0  L   Glute max stretch (SKTC) 30 4 bilat   Piriformis stretch supine 30 4 bilat   Hip extension POT 10sec 10 bilat   Hip abd at wall 10sec 10 bilat   Bridge  2 10    Kneeling Alt Arm-Leg- EOT 0     Side lying LB rot HEP  bilat   Review of HEP 0     Hooklying TA + alt march to 90/90 0     Multifidus chop 1 10 black   TM hip extension drives 1 10 bilat   Leg press hip extension 2 10 Bilat, 10 lb/ea   Prone sh ext with scap retraction 1 10 bilat   Prone row with scap retraction 1 10 bilat   Prone scap retraction with lift 1 10 bilat   SL punch 1 10 bilat   SL ER 1 10 bilat   Shoulder press/flex/ER 1 15 Brown wand   SA punch 1 15 2 lb, bilat   Standing band row 1 15 black   Standing band sh extension 1 15 black   Side stepping ER with band 0  Bilat, green   Side stepping IR with band with eccentric control to ER 1 15 red   GH depression 10 10 [place] ball   Shoulder ER isometrics at wall 10 10    Ball roll on wall 0  Cw/ccw, white ball   Waterfalls 0  Very fatiguing, no wt Lateral wall taps 0  red   Manual Intervention (65837) Min: 35 min      Prone hip ER and anterior hip mob  10 min bilat   Prone L hip ER MET  0 min    Prone L hip anterior hip mob  5 min    Hip belt mobs bilat hip  0 min bilat   Prone PA lumbar mobs  10 min    STM IT band + IT band mobs  0 min    STM lumbar paraspinals  10 min                Shoulder posterior and inferior mobs, STM posterior shoulder and biceps  0 bilat                           NMR re-education (19466)   Min:      Mf Activation- re-ed      TrA Re-ed activation      Glute Max re-ed activation      Prone zandra Shane            Therapeutic Activity (88578) Min:                      Therapeutic Exercise and NMR EXR  [x] (16105) Provided verbal/tactile cueing for activities related to strengthening, flexibility, endurance, ROM  for improvements in proximal hip and core control with self care, mobility, lifting and ambulation. [x] (26989) Provided verbal/tactile cueing for activities related to improving balance, coordination, kinesthetic sense, posture, motor skill, proprioception  to assist with core control in self care, mobility, lifting, and ambulation.      Therapeutic Activities:    [] (61628 or 42687) Provided verbal/tactile cueing for activities related to improving balance, coordination, kinesthetic sense, posture, motor skill, proprioception and motor activation to allow for proper function  with self care and ADLs  [] (17057) Provided training and instruction to the patient for proper core and proximal hip recruitment and positioning with ambulation re-education     Home Exercise Program:    [x] (76673) Reviewed/Progressed HEP activities related to strengthening, flexibility, endurance, ROM of core, proximal hip and LE for functional self-care, mobility, lifting and ambulation   [] (28829) Reviewed/Progressed HEP activities related to improving balance, coordination, kinesthetic sense, posture, motor skill, proprioception of core, proximal hip and LE for self care, mobility, lifting, and ambulation      Manual Treatments:  PROM / STM / Oscillations-Mobs:  G-I, II, III, IV (PA's, Inf., Post.)  [x] (78962) Provided manual therapy to mobilize proximal hip and LS spine soft tissue/joints for the purpose of modulating pain, promoting relaxation,  increasing ROM, reducing/eliminating soft tissue swelling/inflammation/restriction, improving soft tissue extensibility and allowing for proper ROM for normal function with self care, mobility, lifting and ambulation. Modalities:       Charges:  Timed Code Treatment Minutes: 45   Total Treatment Minutes: 45     [] EVAL (LOW) 29322 (typically 20 minutes face-to-face)  [] EVAL (MOD) 14405 (typically 30 minutes face-to-face)  [] EVAL (HIGH) 53797 (typically 45 minutes face-to-face)  [] RE-EVAL     [x] GM(26656) x  1  [] DRY NEEDLE 1 OR 2 MUSCLES  [] NMR (88546) x     [] DRY NEEDLE 3+ MUSCLES  [x] Manual (63862) x  2   [] TA (88787) x     [] Mech Traction (16906)  [] ES(attended) (18830)     [] ES (un) (35294):   [] VASO (21541)  [] Other:    If [facility] Please Indicate Time In/Out  CPT Code Time in Time out                                     GOALS:  Patient stated goal: relieve shoulder and buttock/leg pain  [x] Progressing: [] Met: [] Not Met: [] Adjusted  Therapist goals for Patient:   Short Term Goals: To be achieved in: 2 weeks  1. Independent in HEP and progression per patient tolerance, in order to prevent re-injury. [] Progressing: [x] Met: [] Not Met: [] Adjusted  2. Patient will have a decrease in pain to facilitate improvement in movement, function, and ADLs as indicated by Functional Deficits. [] Progressing: [x] Met: [] Not Met: [] Adjusted    Long Term Goals: To be achieved in: 8 weeks  1. Disability index score of 62 or better for Foto (shoulder) and 25 or better for Dash score (Foto). Disability index score of 10 or better for RAJESH (Foto). To assist with reaching prior level of function.  [x] Progressing: [] Met: [] Not Met: [] Adjusted (MET BOLDED ITEMS)  2. Patient will demonstrate increased AROM to at least 160 degrees forward elevation of shoulder and good LS mobility, good hip ROM to allow for proper joint functioning as indicated by patients Functional Deficits. [] Progressing: [x] Met: [] Not Met: [] Adjusted  3. Patient will demonstrate an increase in Strength of bilateral shoulder girdle and good proximal hip and core activation to allow for proper functional mobility as indicated by patients Functional Deficits. [x] Progressing: [] Met: [] Not Met: [] Adjusted  4. Patient will return to lifting and reaching in functional planes with decreased painful arc of shoulder at  degrees without increased symptoms or restriction. [] Progressing: [x] Met: [] Not Met: [] Adjusted  5. Patient will report decreased lateral thigh pain with sitting and standing for greater than 30-60 minutes. (patient specific functional goal)    [] Progressing: [] Met: [x] Not Met: [] Adjusted    ASSESSMENT:  Patient with less restriction in soft tissue and lumbar joint mobility today compared to last session. Tight in bilateral IR, not as limited in ER of hip. Did well with stretching of hips and low lumbar spine. Reporting less overall pain at conclusion. Treatment/Activity Tolerance:  [x] Patient tolerated treatment well [] Patient limited by fatique  [] Patient limited by pain  [] Patient limited by other medical complications  [] Other:     Overall Progression Towards Functional goals/ Treatment Progress Update:  [x] Patient is progressing as expected towards functional goals listed. [] Progression is slowed due to complexities/Impairments listed. [] Progression has been slowed due to co-morbidities.   [] Plan just implemented, too soon to assess goals progression <30days   [] Goals require adjustment due to lack of progress  [] Patient is not progressing as expected and requires additional follow up with physician  [] Other:    Prognosis for POC: [x] Good [] Fair  [] Poor    Patient requires continued skilled intervention: [x] Yes  [] No        PLAN:   [] Continue per plan of care [] Alter current plan (see comments)  [] Plan of care initiated [x] Hold pending MD visit [] Discharge    Electronically signed by: Bevin Duane, PT    Note: If patient does not return for scheduled/recommended follow up visits, this note will serve as a discharge from care along with the most recent update on progress.

## 2022-10-24 ENCOUNTER — HOSPITAL ENCOUNTER (OUTPATIENT)
Dept: PHYSICAL THERAPY | Age: 77
Setting detail: THERAPIES SERIES
Discharge: HOME | End: 2022-10-24
Payer: MEDICARE

## 2022-10-24 PROCEDURE — 97140 MANUAL THERAPY 1/> REGIONS: CPT

## 2022-10-24 PROCEDURE — 97110 THERAPEUTIC EXERCISES: CPT

## 2022-10-24 NOTE — FLOWSHEET NOTE
[address]arel [address], [address]  Phone: [phone]   Fax:     [phone]         Physical Therapy Treatment Note/ Progress Report:     Date:  10/24/2022      Patient Name:  Madelyn Dupont    :  1945  MRN: [id number]  Restrictions/Precautions:    Medical/Treatment Diagnosis Information:  Diagnosis: R shoulder pain, L shoulder pain, B RTC tear, low back pain, L knee pain  Treatment Diagnosis: tear RTC M75.100, R shoulder pain M25.511, L shoulder pain M25.512, lumbar pain M54.5, L knee pain M25.562, post laminectomy syndrome E45.5  Insurance/Certification information:  PT Insurance Information: [facility] [address] Medicare  Physician Information:    Dr Marshall Zhou and Dr Alireza Cameron of care signed (Y/N):     Date of Patient follow up with Physician:      Progress Report: [x]  Yes  []  No     Date Range for reporting period:  Beginnin2022  Ending: -    Progress report due (10 Rx/or 30 days whichever is less):      Recertification due (POC duration/ or 90 days whichever is less):8/10/2022     Visit # Insurance Allowable Auth Needed   20 [employer] Medicare []Yes    []No     Pain level:  3/10 back and shoulders   Functional Scale: Lumbar Spine (Foto 70 and RAJESH 7.5), Shoulder (Foto 61, Dash 21.7)- 2022    Lumbar spine (Foto 63, RAJESH 14%), Shoulder (Foto 70, Dash 11%)-2022        SUBJECTIVE:  Patient reports that he felt much better after leaving last session. Had a flight and a long drive back from [place]. States that he was really stoved up in back and hips after driving for so long. States that he is a little more loose today and not as sore. Will be having follow up with MD about injection on . OBJECTIVE: tender to palpate in bilateral bicep and bilat posterior shoulders.   Observation:   Test measurements:      ROM   Comments   Lumbar Flex 90 degrees Fingertips to toes   Lumbar Ext 25 degrees        ROM LEFT RIGHT Comments   Lumbar Side Bend 18 18     Lumbar Rotation limited limited     Quadrant negative negative     Hip Flexion 115 115     Hip Abd 45 45     Hip ER 50 55     Hip IR 20 15     Hip Extension limited Limited, not as greatly as L     Knee Ext WNL WNL     Knee Flex WNL WNL     Hamstring Flex full 10% limited     Piriformis 10% limited 10% limited     Vladimir test                   HIP FLEX 4+ 5    HIP ABD 5 4    HIP EXT 4+ 4+       ROM LEFT RIGHT Comments   Shoulder flexion 170 170    Shoulder abduction 140 155    Shoulder ER 60 65     Shoulder IR T4 T5                         STRENGTH LEFT RIGHT     Shoulder flexion 3/5 3/5 With pain   Shoulder scaption 3/5 3/5  with pain   Shoulder ER 4-/5 4/5     Shoulder IR 4/5 4/5        RESTRICTIONS/PRECAUTIONS: n/a    Exercises/Interventions:     Therapeutic Ex (82863)   Min: 10 sets/sec reps notes   Hip Flexor EOT 0  L   Glute max stretch (SKTC) 30 4 bilat   Piriformis stretch supine 30 4 bilat   Hip extension POT 10sec 10 bilat   Hip abd at wall 10sec 10 bilat   Bridge  2 10    Kneeling Alt Arm-Leg- EOT 0     Side lying LB rot HEP  bilat   Review of HEP 0     Hooklying TA + alt march to 90/90 0     Multifidus chop 1 10 black   TM hip extension drives 1 10 bilat   Leg press hip extension 2 10 Bilat, 10 lb/ea   Prone sh ext with scap retraction 1 10 bilat   Prone row with scap retraction 1 10 bilat   Prone scap retraction with lift 1 10 bilat   SL punch 1 10 bilat   SL ER 1 10 bilat   Shoulder press/flex/ER 1 15 Brown wand   SA punch 1 15 2 lb, bilat   Standing band row 1 15 black   Standing band sh extension 1 15 black   Side stepping ER with band 0  Bilat, green   Side stepping IR with band with eccentric control to ER 1 15 red   GH depression 10 10 [place] ball   Shoulder ER isometrics at wall 10 10    Ball roll on wall 0  Cw/ccw, white ball   Waterfalls 0  Very fatiguing, no wt   Lateral wall taps 0  red   Manual Intervention (27022) Min: 30 min      Prone hip ER and anterior hip mob  10 min bilat   Prone L hip ER MET  0 min    Prone L hip anterior hip mob  0 min    Hip belt mobs bilat hip  5 min bilat   Prone PA lumbar mobs  10 min    STM IT band + IT band mobs  0 min    STM lumbar paraspinals  5 min                Shoulder posterior and inferior mobs, STM posterior shoulder and biceps  0 bilat                           NMR re-education (89458)   Min:      Mf Activation- re-ed      TrA Re-ed activation      Glute Max re-ed activation      Prone zandra Shane            Therapeutic Activity (29784) Min:                      Therapeutic Exercise and NMR EXR  [x] (84784) Provided verbal/tactile cueing for activities related to strengthening, flexibility, endurance, ROM  for improvements in proximal hip and core control with self care, mobility, lifting and ambulation. [x] (30018) Provided verbal/tactile cueing for activities related to improving balance, coordination, kinesthetic sense, posture, motor skill, proprioception  to assist with core control in self care, mobility, lifting, and ambulation.      Therapeutic Activities:    [] (33017 or 71714) Provided verbal/tactile cueing for activities related to improving balance, coordination, kinesthetic sense, posture, motor skill, proprioception and motor activation to allow for proper function  with self care and ADLs  [] (79322) Provided training and instruction to the patient for proper core and proximal hip recruitment and positioning with ambulation re-education     Home Exercise Program:    [x] (37094) Reviewed/Progressed HEP activities related to strengthening, flexibility, endurance, ROM of core, proximal hip and LE for functional self-care, mobility, lifting and ambulation   [] (45437) Reviewed/Progressed HEP activities related to improving balance, coordination, kinesthetic sense, posture, motor skill, proprioception of core, proximal hip and LE for self care, mobility, lifting, and ambulation      Manual Treatments:  PROM / STM / Oscillations-Mobs:  G-I, II, III, IV (PA's, Inf., Post.)  [x] (95599) Provided manual therapy to mobilize proximal hip and LS spine soft tissue/joints for the purpose of modulating pain, promoting relaxation,  increasing ROM, reducing/eliminating soft tissue swelling/inflammation/restriction, improving soft tissue extensibility and allowing for proper ROM for normal function with self care, mobility, lifting and ambulation. Modalities:       Charges:  Timed Code Treatment Minutes: 40   Total Treatment Minutes: 40     [] EVAL (LOW) 26358 (typically 20 minutes face-to-face)  [] EVAL (MOD) 86375 (typically 30 minutes face-to-face)  [] EVAL (HIGH) 13876 (typically 45 minutes face-to-face)  [] RE-EVAL     [x] BG(16160) x  1  [] DRY NEEDLE 1 OR 2 MUSCLES  [] NMR (46283) x     [] DRY NEEDLE 3+ MUSCLES  [x] Manual (79139) x  2   [] TA (27189) x     [] Mech Traction (65718)  [] ES(attended) (36511)     [] ES (un) (69462):   [] VASO (62936)  [] Other:    If [facility] Please Indicate Time In/Out  CPT Code Time in Time out                                     GOALS:  Patient stated goal: relieve shoulder and buttock/leg pain  [x] Progressing: [] Met: [] Not Met: [] Adjusted  Therapist goals for Patient:   Short Term Goals: To be achieved in: 2 weeks  1. Independent in HEP and progression per patient tolerance, in order to prevent re-injury. [] Progressing: [x] Met: [] Not Met: [] Adjusted  2. Patient will have a decrease in pain to facilitate improvement in movement, function, and ADLs as indicated by Functional Deficits. [] Progressing: [x] Met: [] Not Met: [] Adjusted    Long Term Goals: To be achieved in: 8 weeks  1. Disability index score of 62 or better for Foto (shoulder) and 25 or better for Dash score (Foto). Disability index score of 10 or better for RAJESH (Foto). To assist with reaching prior level of function.  [x] Progressing: [] Met: [] Not Met: [] Adjusted (MET BOLDED ITEMS)  2. Patient will demonstrate increased AROM to at least 160 degrees forward elevation of shoulder and good LS mobility, good hip ROM to allow for proper joint functioning as indicated by patients Functional Deficits. [] Progressing: [x] Met: [] Not Met: [] Adjusted  3. Patient will demonstrate an increase in Strength of bilateral shoulder girdle and good proximal hip and core activation to allow for proper functional mobility as indicated by patients Functional Deficits. [x] Progressing: [] Met: [] Not Met: [] Adjusted  4. Patient will return to lifting and reaching in functional planes with decreased painful arc of shoulder at  degrees without increased symptoms or restriction. [] Progressing: [x] Met: [] Not Met: [] Adjusted  5. Patient will report decreased lateral thigh pain with sitting and standing for greater than 30-60 minutes. (patient specific functional goal)    [] Progressing: [] Met: [x] Not Met: [] Adjusted    ASSESSMENT:  Patient with less restriction in soft tissue and lumbar joint mobility today compared to last session. Tight in bilateral IR, not as limited in ER of hip. Did well with stretching of hips and low lumbar spine. Reporting less overall pain at conclusion. Treatment/Activity Tolerance:  [x] Patient tolerated treatment well [] Patient limited by fatique  [] Patient limited by pain  [] Patient limited by other medical complications  [] Other:     Overall Progression Towards Functional goals/ Treatment Progress Update:  [x] Patient is progressing as expected towards functional goals listed. [] Progression is slowed due to complexities/Impairments listed. [] Progression has been slowed due to co-morbidities.   [] Plan just implemented, too soon to assess goals progression <30days   [] Goals require adjustment due to lack of progress  [] Patient is not progressing as expected and requires additional follow up with physician  [] Other:    Prognosis for POC: [x] Good [] Fair  [] Poor    Patient requires continued skilled intervention: [x] Yes  [] No        PLAN:   [] Continue per plan of care [] Alter current plan (see comments)  [] Plan of care initiated [x] Hold pending MD visit [] Discharge    Electronically signed by: Serenity Sender, PT    Note: If patient does not return for scheduled/recommended follow up visits, this note will serve as a discharge from care along with the most recent update on progress.

## 2022-11-09 ENCOUNTER — HOSPITAL ENCOUNTER (OUTPATIENT)
Dept: PHYSICAL THERAPY | Age: 77
Setting detail: THERAPIES SERIES
Discharge: HOME | End: 2022-11-09
Payer: MEDICARE

## 2022-11-09 PROCEDURE — 97140 MANUAL THERAPY 1/> REGIONS: CPT

## 2022-11-09 PROCEDURE — 97110 THERAPEUTIC EXERCISES: CPT

## 2022-11-09 NOTE — FLOWSHEET NOTE
[address]  Phone: [phone]   Fax:     [phone]         Physical Therapy Treatment Note/ Progress Report:     Date:  2022      Patient Name:  Lexie Franklin    :  1945  MRN: [id number]  Restrictions/Precautions:    Medical/Treatment Diagnosis Information:  Diagnosis: R shoulder pain, L shoulder pain, B RTC tear, low back pain, L knee pain  Treatment Diagnosis: tear RTC M75.100, R shoulder pain M25.511, L shoulder pain M25.512, lumbar pain M54.5, L knee pain M25.562, post laminectomy syndrome M07.8  Insurance/Certification information:  PT Insurance Information: [place] [employer]  Physician Information:    Dr Mary Cerda and Dr Lc Ritchie of care signed (Y/N):     Date of Patient follow up with Physician:      Progress Report: [x]  Yes  []  No     Date Range for reporting period:  Beginnin2022  Ending: -    Progress report due (10 Rx/or 30 days whichever is less):      Recertification due (POC duration/ or 90 days whichever is less):     Visit # Insurance Allowable Auth Needed   21 [employer] Medicare []Yes    []No     Pain level:  3/10 back and shoulders   Functional Scale: Lumbar Spine (Foto 70 and RAJESH 7.5), Shoulder (Foto 61, Dash 21.7)- 2022    Lumbar spine (Foto 63, RAJESH 14%), Shoulder (Foto 70, Dash 11%)-2022        SUBJECTIVE:  Patient reports that he felt much better after leaving last session. States that back is feeling pretty good today. Had follow up with MD and will be having next injection on . OBJECTIVE: tender to palpate in bilateral bicep and bilat posterior shoulders.   Observation:   Test measurements:      ROM   Comments   Lumbar Flex 90 degrees Fingertips to toes   Lumbar Ext 25 degrees        ROM LEFT RIGHT Comments   Lumbar Side Bend 18 18     Lumbar Rotation limited limited     Quadrant negative negative     Hip Flexion 115 115     Hip Abd 45 45 Hip ER 50 55     Hip IR 20 15     Hip Extension limited Limited, not as greatly as L     Knee Ext WNL WNL     Knee Flex WNL WNL     Hamstring Flex full 10% limited     Piriformis 10% limited 10% limited     Vladimir test                   HIP FLEX 4+ 5    HIP ABD 5 4    HIP EXT 4+ 4+       ROM LEFT RIGHT Comments   Shoulder flexion 170 170    Shoulder abduction 140 155    Shoulder ER 60 65     Shoulder IR T4 T5                         STRENGTH LEFT RIGHT     Shoulder flexion 3/5 3/5 With pain   Shoulder scaption 3/5 3/5  with pain   Shoulder ER 4-/5 4/5     Shoulder IR 4/5 4/5        RESTRICTIONS/PRECAUTIONS: n/a    Exercises/Interventions:     Therapeutic Ex (02189)   Min: 10 sets/sec reps notes   Hip Flexor EOT 0  L   Glute max stretch (SKTC) 30 4 bilat   Piriformis stretch supine 30 4 bilat   Hip extension POT 10sec 10 bilat   Hip abd at wall 10sec 10 bilat   Bridge  2 10    Kneeling Alt Arm-Leg- EOT 0     Side lying LB rot HEP  bilat   Review of HEP 0     Hooklying TA + alt march to 90/90 0     Multifidus chop 1 10 black   TM hip extension drives 1 10 bilat   Leg press hip extension 2 10 Bilat, 10 lb/ea   Prone sh ext with scap retraction 1 10 bilat   Prone row with scap retraction 1 10 bilat   Prone scap retraction with lift 1 10 bilat   SL punch 1 10 bilat   SL ER 1 10 bilat   Shoulder press/flex/ER 1 15 Brown wand   SA punch 1 15 2 lb, bilat   Standing band row 1 15 black   Standing band sh extension 1 15 black   Side stepping ER with band 0  Bilat, green   Side stepping IR with band with eccentric control to ER 1 15 red   GH depression 10 10 [place] ball   Shoulder ER isometrics at wall 10 10    Ball roll on wall 0  Cw/ccw, white ball   Waterfalls 0  Very fatiguing, no wt   Lateral wall taps 0  red   Manual Intervention (15179) Min: 30 min      Prone hip ER and anterior hip mob  10 min bilat   Prone L hip ER MET  0 min    Prone L hip anterior hip mob  0 min    Hip belt mobs bilat hip  5 min bilat   Prone PA lumbar mobs  10 min    STM IT band + IT band mobs  0 min    STM lumbar paraspinals  5 min                Shoulder posterior and inferior mobs, STM posterior shoulder and biceps  0 bilat                           NMR re-education (12919)   Min:      Mf Activation- re-ed      TrA Re-ed activation      Glute Max re-ed activation      Prone zandra Shane            Therapeutic Activity (29315) Min:                      Therapeutic Exercise and NMR EXR  [x] (65944) Provided verbal/tactile cueing for activities related to strengthening, flexibility, endurance, ROM  for improvements in proximal hip and core control with self care, mobility, lifting and ambulation. [x] (71497) Provided verbal/tactile cueing for activities related to improving balance, coordination, kinesthetic sense, posture, motor skill, proprioception  to assist with core control in self care, mobility, lifting, and ambulation.      Therapeutic Activities:    [] (16512 or 43667) Provided verbal/tactile cueing for activities related to improving balance, coordination, kinesthetic sense, posture, motor skill, proprioception and motor activation to allow for proper function  with self care and ADLs  [] (78157) Provided training and instruction to the patient for proper core and proximal hip recruitment and positioning with ambulation re-education     Home Exercise Program:    [x] (64795) Reviewed/Progressed HEP activities related to strengthening, flexibility, endurance, ROM of core, proximal hip and LE for functional self-care, mobility, lifting and ambulation   [] (66616) Reviewed/Progressed HEP activities related to improving balance, coordination, kinesthetic sense, posture, motor skill, proprioception of core, proximal hip and LE for self care, mobility, lifting, and ambulation      Manual Treatments:  PROM / STM / Oscillations-Mobs:  G-I, II, III, IV (PA's, Inf., Post.)  [x] (00380) Provided manual therapy to mobilize proximal hip and LS spine soft tissue/joints for the purpose of modulating pain, promoting relaxation,  increasing ROM, reducing/eliminating soft tissue swelling/inflammation/restriction, improving soft tissue extensibility and allowing for proper ROM for normal function with self care, mobility, lifting and ambulation. Modalities:       Charges:  Timed Code Treatment Minutes: 40   Total Treatment Minutes: 40     [] EVAL (LOW) 96119 (typically 20 minutes face-to-face)  [] EVAL (MOD) 10172 (typically 30 minutes face-to-face)  [] EVAL (HIGH) 29715 (typically 45 minutes face-to-face)  [] RE-EVAL     [x] CX(69878) x  1  [] DRY NEEDLE 1 OR 2 MUSCLES  [] NMR (61318) x     [] DRY NEEDLE 3+ MUSCLES  [x] Manual (70428) x  2   [] TA (31505) x     [] Mech Traction (70712)  [] ES(attended) (29161)     [] ES (un) (07931):   [] VASO (96389)  [] Other:    If [facility] Please Indicate Time In/Out  CPT Code Time in Time out                                     GOALS:  Patient stated goal: relieve shoulder and buttock/leg pain  [x] Progressing: [] Met: [] Not Met: [] Adjusted  Therapist goals for Patient:   Short Term Goals: To be achieved in: 2 weeks  1. Independent in HEP and progression per patient tolerance, in order to prevent re-injury. [] Progressing: [x] Met: [] Not Met: [] Adjusted  2. Patient will have a decrease in pain to facilitate improvement in movement, function, and ADLs as indicated by Functional Deficits. [] Progressing: [x] Met: [] Not Met: [] Adjusted    Long Term Goals: To be achieved in: 8 weeks  1. Disability index score of 62 or better for Foto (shoulder) and 25 or better for Dash score (Foto). Disability index score of 10 or better for RAJESH (Foto). To assist with reaching prior level of function.  [x] Progressing: [] Met: [] Not Met: [] Adjusted (MET BOLDED ITEMS)  2. Patient will demonstrate increased AROM to at least 160 degrees forward elevation of shoulder and good LS mobility, good hip ROM to allow for proper joint functioning as indicated by patients Functional Deficits. [] Progressing: [x] Met: [] Not Met: [] Adjusted  3. Patient will demonstrate an increase in Strength of bilateral shoulder girdle and good proximal hip and core activation to allow for proper functional mobility as indicated by patients Functional Deficits. [x] Progressing: [] Met: [] Not Met: [] Adjusted  4. Patient will return to lifting and reaching in functional planes with decreased painful arc of shoulder at  degrees without increased symptoms or restriction. [] Progressing: [x] Met: [] Not Met: [] Adjusted  5. Patient will report decreased lateral thigh pain with sitting and standing for greater than 30-60 minutes. (patient specific functional goal)    [] Progressing: [] Met: [x] Not Met: [] Adjusted    ASSESSMENT:  Patient with continued improvement in soft tissue and lumbar joint mobility today compared to last session. No restriction noted in ER in prone today. Mild restriction in bilateral IR. Did well with stretching of hips and low lumbar spine. Reporting less overall pain at conclusion. Will continue every other week. Treatment/Activity Tolerance:  [x] Patient tolerated treatment well [] Patient limited by fatique  [] Patient limited by pain  [] Patient limited by other medical complications  [] Other:     Overall Progression Towards Functional goals/ Treatment Progress Update:  [x] Patient is progressing as expected towards functional goals listed. [] Progression is slowed due to complexities/Impairments listed. [] Progression has been slowed due to co-morbidities.   [] Plan just implemented, too soon to assess goals progression <30days   [] Goals require adjustment due to lack of progress  [] Patient is not progressing as expected and requires additional follow up with physician  [] Other:    Prognosis for POC: [x] Good [] Fair  [] Poor    Patient requires continued skilled intervention: [x] Yes  [] No        PLAN:   [x] Continue per plan of care [] Alter current plan (see comments)  [] Plan of care initiated [] Hold pending MD visit [] Discharge    Electronically signed by: Li Prajapati PT    Note: If patient does not return for scheduled/recommended follow up visits, this note will serve as a discharge from care along with the most recent update on progress.

## 2022-11-22 ENCOUNTER — HOSPITAL ENCOUNTER (OUTPATIENT)
Dept: PHYSICAL THERAPY | Age: 77
Setting detail: THERAPIES SERIES
Discharge: HOME | End: 2022-11-22
Payer: MEDICARE

## 2022-11-22 PROCEDURE — 97110 THERAPEUTIC EXERCISES: CPT

## 2022-11-22 PROCEDURE — 97140 MANUAL THERAPY 1/> REGIONS: CPT

## 2022-11-22 NOTE — FLOWSHEET NOTE
[address]  Phone: [phone]   Fax:     [phone]         Physical Therapy Treatment Note/ Progress Report:     Date:  2022      Patient Name:  Eric Wallis    :  1945  MRN: [id number]  Restrictions/Precautions:    Medical/Treatment Diagnosis Information:  Diagnosis: R shoulder pain, L shoulder pain, B RTC tear, low back pain, L knee pain  Treatment Diagnosis: tear RTC M75.100, R shoulder pain M25.511, L shoulder pain M25.512, lumbar pain M54.5, L knee pain M25.562, post laminectomy syndrome C74.9  Insurance/Certification information:  PT Insurance Information: [address] [place] [address]  Medicare  Physician Information:    Dr Isaiah Vazquez and Dr Melissa Lee of care signed (Y/N):     Date of Patient follow up with Physician:      Progress Report: [x]  Yes  []  No     Date Range for reporting period:  Beginnin2022  Ending: -    Progress report due (10 Rx/or 30 days whichever is less):      Recertification due (POC duration/ or 90 days whichever is less):     Visit # Insurance Allowable Auth Needed   22 A[employer] Medicare []Yes    []No     Pain level:  3/10 back and shoulders   Functional Scale: Lumbar Spine (Foto 70 and RAJESH 7.5), Shoulder (Foto 61, Dash 21.7)- 2022    Lumbar spine (Foto 63, RAJESH 14%), Shoulder (Foto 70, Dash 11%)-2022        SUBJECTIVE:  Patient reports that his L hip is feeling stiff today, as well as low back and even his upper back is feeling tight. Notes that he will be having diagnostic US on shoulders on  and then will be having next injection on . OBJECTIVE: tender to palpate in bilateral bicep and bilat posterior shoulders.   Observation:   Test measurements:      ROM   Comments   Lumbar Flex 90 degrees Fingertips to toes   Lumbar Ext 25 degrees        ROM LEFT RIGHT Comments   Lumbar Side Bend 18 18     Lumbar Rotation limited limited     Quadrant negative negative     Hip Flexion 115 115     Hip Abd 45 45     Hip ER 50 55     Hip IR 20 15     Hip Extension limited Limited, not as greatly as L     Knee Ext WNL WNL     Knee Flex WNL WNL     Hamstring Flex full 10% limited     Piriformis 10% limited 10% limited     Vladimir test                   HIP FLEX 4+ 5    HIP ABD 5 4    HIP EXT 4+ 4+       ROM LEFT RIGHT Comments   Shoulder flexion 170 170    Shoulder abduction 140 155    Shoulder ER 60 65     Shoulder IR T4 T5                         STRENGTH LEFT RIGHT     Shoulder flexion 3/5 3/5 With pain   Shoulder scaption 3/5 3/5  with pain   Shoulder ER 4-/5 4/5     Shoulder IR 4/5 4/5        RESTRICTIONS/PRECAUTIONS: n/a    Exercises/Interventions:     Therapeutic Ex (21622)   Min: 10 sets/sec reps notes   Hip Flexor EOT 0  L   Glute max stretch (SKTC) 30 4 bilat   Piriformis stretch supine 30 4 bilat   Hip extension POT 10sec 10 bilat   Hip abd at wall 10sec 10 bilat   Bridge  2 10    Kneeling Alt Arm-Leg- EOT 0     Side lying LB rot HEP  bilat   Review of HEP 0     Hooklying TA + alt march to 90/90 0     Multifidus chop 1 10 black   TM hip extension drives 1 10 bilat   Leg press hip extension 2 10 Bilat, 10 lb/ea   Prone sh ext with scap retraction 1 10 bilat   Prone row with scap retraction 1 10 bilat   Prone scap retraction with lift 1 10 bilat   SL punch 1 10 bilat   SL ER 1 10 bilat   Shoulder press/flex/ER 1 15 Brown wand   SA punch 1 15 2 lb, bilat   Standing band row 1 15 black   Standing band sh extension 1 15 black   Side stepping ER with band 0  Bilat, green   Side stepping IR with band with eccentric control to ER 1 15 red   GH depression 10 10 [place] ball   Shoulder ER isometrics at wall 10 10    Ball roll on wall 0  Cw/ccw, white ball   Waterfalls 0  Very fatiguing, no wt   Lateral wall taps 0  red   Manual Intervention (28743) Min: 30 min      Prone hip ER and anterior hip mob  0 min bilat   Prone L hip ER MET  0 min    Prone L hip anterior hip mob  0 min Hip belt mobs bilat hip  5 min L   Prone PA lumbar mobs  10 min    STM IT band + IT band mobs  0 min    STM lumbar paraspinals  5 min    Prone PA thoracic spine  10 min          Shoulder posterior and inferior mobs, STM posterior shoulder and biceps  0 bilat                           NMR re-education (45568)   Min:      Mf Activation- re-ed      TrA Re-ed activation      Glute Max re-ed activation      Prone zandra Shane            Therapeutic Activity (34723) Min:                      Therapeutic Exercise and NMR EXR  [x] (99147) Provided verbal/tactile cueing for activities related to strengthening, flexibility, endurance, ROM  for improvements in proximal hip and core control with self care, mobility, lifting and ambulation. [x] (17324) Provided verbal/tactile cueing for activities related to improving balance, coordination, kinesthetic sense, posture, motor skill, proprioception  to assist with core control in self care, mobility, lifting, and ambulation.      Therapeutic Activities:    [] (83258 or 84955) Provided verbal/tactile cueing for activities related to improving balance, coordination, kinesthetic sense, posture, motor skill, proprioception and motor activation to allow for proper function  with self care and ADLs  [] (42204) Provided training and instruction to the patient for proper core and proximal hip recruitment and positioning with ambulation re-education     Home Exercise Program:    [x] (89089) Reviewed/Progressed HEP activities related to strengthening, flexibility, endurance, ROM of core, proximal hip and LE for functional self-care, mobility, lifting and ambulation   [] (50618) Reviewed/Progressed HEP activities related to improving balance, coordination, kinesthetic sense, posture, motor skill, proprioception of core, proximal hip and LE for self care, mobility, lifting, and ambulation      Manual Treatments:  PROM / STM / Oscillations-Mobs:  G-I, II, III, IV (PA's, Inf., Post.)  [x] (62588) Provided manual therapy to mobilize proximal hip and LS spine soft tissue/joints for the purpose of modulating pain, promoting relaxation,  increasing ROM, reducing/eliminating soft tissue swelling/inflammation/restriction, improving soft tissue extensibility and allowing for proper ROM for normal function with self care, mobility, lifting and ambulation. Modalities:       Charges:  Timed Code Treatment Minutes: 40   Total Treatment Minutes: 40     [] EVAL (LOW) 46711 (typically 20 minutes face-to-face)  [] EVAL (MOD) 72684 (typically 30 minutes face-to-face)  [] EVAL (HIGH) 93697 (typically 45 minutes face-to-face)  [] RE-EVAL     [x] KP(99282) x  1  [] DRY NEEDLE 1 OR 2 MUSCLES  [] NMR (52622) x     [] DRY NEEDLE 3+ MUSCLES  [x] Manual (61389) x  2   [] TA (61071) x     [] Mech Traction (64517)  [] ES(attended) (17243)     [] ES (un) (28213):   [] VASO (17346)  [] Other:    If [facility] Please Indicate Time In/Out  CPT Code Time in Time out                                     GOALS:  Patient stated goal: relieve shoulder and buttock/leg pain  [x] Progressing: [] Met: [] Not Met: [] Adjusted  Therapist goals for Patient:   Short Term Goals: To be achieved in: 2 weeks  1. Independent in HEP and progression per patient tolerance, in order to prevent re-injury. [] Progressing: [x] Met: [] Not Met: [] Adjusted  2. Patient will have a decrease in pain to facilitate improvement in movement, function, and ADLs as indicated by Functional Deficits. [] Progressing: [x] Met: [] Not Met: [] Adjusted    Long Term Goals: To be achieved in: 8 weeks  1. Disability index score of 62 or better for Foto (shoulder) and 25 or better for Dash score (Foto). Disability index score of 10 or better for RAJESH (Foto). To assist with reaching prior level of function.  [x] Progressing: [] Met: [] Not Met: [] Adjusted (MET BOLDED ITEMS)  2. Patient will demonstrate increased AROM to at least 160 degrees forward elevation of shoulder and good LS mobility, good hip ROM to allow for proper joint functioning as indicated by patients Functional Deficits. [] Progressing: [x] Met: [] Not Met: [] Adjusted  3. Patient will demonstrate an increase in Strength of bilateral shoulder girdle and good proximal hip and core activation to allow for proper functional mobility as indicated by patients Functional Deficits. [x] Progressing: [] Met: [] Not Met: [] Adjusted  4. Patient will return to lifting and reaching in functional planes with decreased painful arc of shoulder at  degrees without increased symptoms or restriction. [] Progressing: [x] Met: [] Not Met: [] Adjusted  5. Patient will report decreased lateral thigh pain with sitting and standing for greater than 30-60 minutes. (patient specific functional goal)    [] Progressing: [] Met: [x] Not Met: [] Adjusted    ASSESSMENT:  Patient with tightness noted today in lumbar and thoracic spine, as well as with L hip IR. Did well with all mobilization of spine and hip with good improvement in overall mobility. Treatment/Activity Tolerance:  [x] Patient tolerated treatment well [] Patient limited by fatique  [] Patient limited by pain  [] Patient limited by other medical complications  [] Other:     Overall Progression Towards Functional goals/ Treatment Progress Update:  [x] Patient is progressing as expected towards functional goals listed. [] Progression is slowed due to complexities/Impairments listed. [] Progression has been slowed due to co-morbidities.   [] Plan just implemented, too soon to assess goals progression <30days   [] Goals require adjustment due to lack of progress  [] Patient is not progressing as expected and requires additional follow up with physician  [] Other:    Prognosis for POC: [x] Good [] Fair  [] Poor    Patient requires continued skilled intervention: [x] Yes  [] No        PLAN:   [x] Continue per plan of care [] Alter current plan (see comments)  [] Plan of care initiated [] Hold pending MD visit [] Discharge    Electronically signed by: Orquidea Owusu PT    Note: If patient does not return for scheduled/recommended follow up visits, this note will serve as a discharge from care along with the most recent update on progress.

## 2022-12-01 ENCOUNTER — HOSPITAL ENCOUNTER (OUTPATIENT)
Dept: PHYSICAL THERAPY | Age: 77
Setting detail: THERAPIES SERIES
Discharge: HOME | End: 2022-12-01
Payer: MEDICARE

## 2022-12-01 PROCEDURE — 97110 THERAPEUTIC EXERCISES: CPT

## 2022-12-01 PROCEDURE — 97140 MANUAL THERAPY 1/> REGIONS: CPT

## 2022-12-01 NOTE — FLOWSHEET NOTE
[address]  Phone: [phone]   Fax:     [phone]         Physical Therapy Treatment Note/ Progress Report:     Date:  2022      Patient Name:  Rhonda Alston    :  1945  MRN: [id number]  Restrictions/Precautions:    Medical/Treatment Diagnosis Information:  Diagnosis: R shoulder pain, L shoulder pain, B RTC tear, low back pain, L knee pain  Treatment Diagnosis: tear RTC M75.100, R shoulder pain M25.511, L shoulder pain M25.512, lumbar pain M54.5, L knee pain M25.562, post laminectomy syndrome L56.2  Insurance/Certification information:  PT Insurance Information: [address] [place] [address]  Medicare  Physician Information:    Dr Scott Chen and Dr Sloane Arnett of care signed (Y/N):     Date of Patient follow up with Physician:      Progress Report: [x]  Yes  []  No     Date Range for reporting period:  Beginnin2022  Ending: -    Progress report due (10 Rx/or 30 days whichever is less):      Recertification due (POC duration/ or 90 days whichever is less):     Visit # Insurance Allowable Auth Needed   23 [employer] Medicare []Yes    []No     Pain level:  3/10 back and shoulders   Functional Scale: Lumbar Spine (Foto 70 and RAJESH 7.5), Shoulder (Foto 61, Dash 21.7)- 2022    Lumbar spine (Foto 63, RAJESH 14%), Shoulder (Foto 70, Dash 11%)-2022        SUBJECTIVE:  Patient reports that his hips, low back and upper back are a little sore today. Had to move a few 50 lb bags of feed yesterday. Shoulders did ok with this. He will be having diagnostic US on shoulders on  and then will be having next injection on . OBJECTIVE: tender to palpate in bilateral bicep and bilat posterior shoulders.   Observation:   Test measurements:      ROM   Comments   Lumbar Flex 90 degrees Fingertips to toes   Lumbar Ext 25 degrees        ROM LEFT RIGHT Comments   Lumbar Side Bend 18 18     Lumbar Rotation limited limited Quadrant negative negative     Hip Flexion 115 115     Hip Abd 45 45     Hip ER 50 55     Hip IR 20 15     Hip Extension limited Limited, not as greatly as L     Knee Ext WNL WNL     Knee Flex WNL WNL     Hamstring Flex full 10% limited     Piriformis 10% limited 10% limited     Vladimir test                   HIP FLEX 4+ 5    HIP ABD 5 4    HIP EXT 4+ 4+       ROM LEFT RIGHT Comments   Shoulder flexion 170 170    Shoulder abduction 140 155    Shoulder ER 60 65     Shoulder IR T4 T5                         STRENGTH LEFT RIGHT     Shoulder flexion 3/5 3/5 With pain   Shoulder scaption 3/5 3/5  with pain   Shoulder ER 4-/5 4/5     Shoulder IR 4/5 4/5        RESTRICTIONS/PRECAUTIONS: n/a    Exercises/Interventions:     Therapeutic Ex (81356)   Min: 10 sets/sec reps notes   Hip Flexor EOT 0  L   Glute max stretch (SKTC) 30 4 bilat   Piriformis stretch supine 30 4 bilat   Hip extension POT 10sec 10 bilat   Hip abd at wall 10sec 10 bilat   Bridge  2 10    Kneeling Alt Arm-Leg- EOT 0     Side lying LB rot HEP  bilat   Review of HEP 0     Hooklying TA + alt march to 90/90 0     Multifidus chop 1 10 black   TM hip extension drives 1 10 bilat   Leg press hip extension 2 10 Bilat, 10 lb/ea   Prone sh ext with scap retraction 1 10 bilat   Prone row with scap retraction 1 10 bilat   Prone scap retraction with lift 1 10 bilat   SL punch 1 10 bilat   SL ER 1 10 bilat   Shoulder press/flex/ER 1 15 Brown wand   SA punch 1 15 2 lb, bilat   Standing band row 1 15 black   Standing band sh extension 1 15 black   Side stepping ER with band 0  Bilat, green   Side stepping IR with band with eccentric control to ER 1 15 red   GH depression 10 10 [place] ball   Shoulder ER isometrics at wall 10 10    Ball roll on wall 0  Cw/ccw, white ball   Waterfalls 0  Very fatiguing, no wt   Lateral wall taps 0  red   Manual Intervention (39382) Min: 33 min      Prone hip ER and anterior hip mob  2 min bilat   Prone L hip ER MET  0 min    Prone L hip anterior hip mob  0 min    Hip belt mobs bilat hip  6 min L   Prone PA lumbar mobs  10 min    STM IT band + IT band mobs  0 min    STM lumbar paraspinals  5 min    Prone PA thoracic spine  10 min          Shoulder posterior and inferior mobs, STM posterior shoulder and biceps  0 bilat                           NMR re-education (26783)   Min:      Mf Activation- re-ed      TrA Re-ed activation      Glute Max re-ed activation      Prone zandra Shane            Therapeutic Activity (52557) Min:                      Therapeutic Exercise and NMR EXR  [x] (00471) Provided verbal/tactile cueing for activities related to strengthening, flexibility, endurance, ROM  for improvements in proximal hip and core control with self care, mobility, lifting and ambulation. [x] (27769) Provided verbal/tactile cueing for activities related to improving balance, coordination, kinesthetic sense, posture, motor skill, proprioception  to assist with core control in self care, mobility, lifting, and ambulation.      Therapeutic Activities:    [] (76933 or 30199) Provided verbal/tactile cueing for activities related to improving balance, coordination, kinesthetic sense, posture, motor skill, proprioception and motor activation to allow for proper function  with self care and ADLs  [] (25106) Provided training and instruction to the patient for proper core and proximal hip recruitment and positioning with ambulation re-education     Home Exercise Program:    [x] (23157) Reviewed/Progressed HEP activities related to strengthening, flexibility, endurance, ROM of core, proximal hip and LE for functional self-care, mobility, lifting and ambulation   [] (16061) Reviewed/Progressed HEP activities related to improving balance, coordination, kinesthetic sense, posture, motor skill, proprioception of core, proximal hip and LE for self care, mobility, lifting, and ambulation      Manual Treatments:  PROM / STM / Oscillations-Mobs:  G-I, II, III, IV (Jered, Inf., Post.)  [x] (00597) Provided manual therapy to mobilize proximal hip and LS spine soft tissue/joints for the purpose of modulating pain, promoting relaxation,  increasing ROM, reducing/eliminating soft tissue swelling/inflammation/restriction, improving soft tissue extensibility and allowing for proper ROM for normal function with self care, mobility, lifting and ambulation. Modalities:       Charges:  Timed Code Treatment Minutes: 43   Total Treatment Minutes: 43     [] EVAL (LOW) 87444 (typically 20 minutes face-to-face)  [] EVAL (MOD) 40491 (typically 30 minutes face-to-face)  [] EVAL (HIGH) 09789 (typically 45 minutes face-to-face)  [] RE-EVAL     [x] HL(39991) x  1  [] DRY NEEDLE 1 OR 2 MUSCLES  [] NMR (05557) x     [] DRY NEEDLE 3+ MUSCLES  [x] Manual (01477) x  2   [] TA (91884) x     [] Mech Traction (19535)  [] ES(attended) (22174)     [] ES (un) (61241):   [] VASO (15548)  [] Other:    If [facility] Please Indicate Time In/Out  CPT Code Time in Time out                                     GOALS:  Patient stated goal: relieve shoulder and buttock/leg pain  [x] Progressing: [] Met: [] Not Met: [] Adjusted  Therapist goals for Patient:   Short Term Goals: To be achieved in: 2 weeks  1. Independent in HEP and progression per patient tolerance, in order to prevent re-injury. [] Progressing: [x] Met: [] Not Met: [] Adjusted  2. Patient will have a decrease in pain to facilitate improvement in movement, function, and ADLs as indicated by Functional Deficits. [] Progressing: [x] Met: [] Not Met: [] Adjusted    Long Term Goals: To be achieved in: 8 weeks  1. Disability index score of 62 or better for Foto (shoulder) and 25 or better for Dash score (Foto). Disability index score of 10 or better for RAJESH (Foto). To assist with reaching prior level of function.  [x] Progressing: [] Met: [] Not Met: [] Adjusted (MET BOLDED ITEMS)  2. Patient will demonstrate increased AROM to at least 160 degrees forward elevation of shoulder and good LS mobility, good hip ROM to allow for proper joint functioning as indicated by patients Functional Deficits. [] Progressing: [x] Met: [] Not Met: [] Adjusted  3. Patient will demonstrate an increase in Strength of bilateral shoulder girdle and good proximal hip and core activation to allow for proper functional mobility as indicated by patients Functional Deficits. [x] Progressing: [] Met: [] Not Met: [] Adjusted  4. Patient will return to lifting and reaching in functional planes with decreased painful arc of shoulder at  degrees without increased symptoms or restriction. [] Progressing: [x] Met: [] Not Met: [] Adjusted  5. Patient will report decreased lateral thigh pain with sitting and standing for greater than 30-60 minutes. (patient specific functional goal)    [] Progressing: [] Met: [x] Not Met: [] Adjusted    ASSESSMENT:  Patient with tightness noted today in lumbar and thoracic spine, as well as with bilat hip IR. Did well with all mobilization of spine and hip with good improvement in overall mobility. Much less soreness and tightness overall at conclusion of session. Will follow up after next injection. Treatment/Activity Tolerance:  [x] Patient tolerated treatment well [] Patient limited by fatique  [] Patient limited by pain  [] Patient limited by other medical complications  [] Other:     Overall Progression Towards Functional goals/ Treatment Progress Update:  [x] Patient is progressing as expected towards functional goals listed. [] Progression is slowed due to complexities/Impairments listed. [] Progression has been slowed due to co-morbidities.   [] Plan just implemented, too soon to assess goals progression <30days   [] Goals require adjustment due to lack of progress  [] Patient is not progressing as expected and requires additional follow up with physician  [] Other:    Prognosis for POC: [x] Good [] Fair  [] Poor    Patient requires continued skilled intervention: [x] Yes  [] No        PLAN:   [x] Continue per plan of care [] Alter current plan (see comments)  [] Plan of care initiated [] Hold pending MD visit [] Discharge    Electronically signed by: Nuria Melgoza PT    Note: If patient does not return for scheduled/recommended follow up visits, this note will serve as a discharge from care along with the most recent update on progress.

## 2022-12-13 ENCOUNTER — HOSPITAL ENCOUNTER (OUTPATIENT)
Dept: PHYSICAL THERAPY | Age: 77
Setting detail: THERAPIES SERIES
Discharge: HOME | End: 2022-12-13
Payer: MEDICARE

## 2022-12-13 PROCEDURE — 97140 MANUAL THERAPY 1/> REGIONS: CPT

## 2022-12-13 NOTE — FLOWSHEET NOTE
[address]  Phone: [phone]   Fax:     [phone]         Physical Therapy Treatment Note/ Progress Report:     Date:  2022      Patient Name:  Amalia Mancini    :  1945  MRN: [id number]  Restrictions/Precautions:    Medical/Treatment Diagnosis Information:  Diagnosis: R shoulder pain, L shoulder pain, B RTC tear, low back pain, L knee pain  Treatment Diagnosis: tear RTC M75.100, R shoulder pain M25.511, L shoulder pain M25.512, lumbar pain M54.5, L knee pain M25.562, post laminectomy syndrome O31.4  Insurance/Certification information:  PT Insurance Information: [address] [place] [address]  Medicare  Physician Information:    Dr Campos Dear and Dr Ridley Breaker of care signed (Y/N):     Date of Patient follow up with Physician:      Progress Report: [x]  Yes  []  No     Date Range for reporting period:  Beginnin2022  Ending: -    Progress report due (10 Rx/or 30 days whichever is less):      Recertification due (POC duration/ or 90 days whichever is less):     Visit # Insurance Allowable Auth Needed   24 [employer] Medicare []Yes    []No     Pain level:  3/10 back and shoulders   Functional Scale: Lumbar Spine (Foto 70 and RAJESH 7.5), Shoulder (Foto 61, Dash 21.7)- 2022    Lumbar spine (Foto 63, RAJESH 14%), Shoulder (Foto 70, Dash 11%)-2022        SUBJECTIVE:  Patient reports that he had dx US on shoulders- isn't going to do much with them at this point. Had injection in his back on Thursday and had much better results than with his first injection. States that he had 3 good days without back or hip pain. Will be having another follow up on  to determine where to go next. States that his hips are tight and low back is a little tight from doing more over the past few days. OBJECTIVE: tender to palpate in bilateral bicep and bilat posterior shoulders.   Observation:   Test measurements:      ROM Comments   Lumbar Flex 90 degrees Fingertips to toes   Lumbar Ext 25 degrees        ROM LEFT RIGHT Comments   Lumbar Side Bend 18 18     Lumbar Rotation limited limited     Quadrant negative negative     Hip Flexion 115 115     Hip Abd 45 45     Hip ER 50 55     Hip IR 20 15     Hip Extension limited Limited, not as greatly as L     Knee Ext WNL WNL     Knee Flex WNL WNL     Hamstring Flex full 10% limited     Piriformis 10% limited 10% limited     Vladimir test                   HIP FLEX 4+ 5    HIP ABD 5 4    HIP EXT 4+ 4+       ROM LEFT RIGHT Comments   Shoulder flexion 170 170    Shoulder abduction 140 155    Shoulder ER 60 65     Shoulder IR T4 T5                         STRENGTH LEFT RIGHT     Shoulder flexion 3/5 3/5 With pain   Shoulder scaption 3/5 3/5  with pain   Shoulder ER 4-/5 4/5     Shoulder IR 4/5 4/5        RESTRICTIONS/PRECAUTIONS: n/a    Exercises/Interventions:     Therapeutic Ex (87260)   Min: 0 sets/sec reps notes   Hip Flexor EOT 0  L   Glute max stretch (SKTC) 30 4 bilat   Piriformis stretch supine 30 4 bilat   Hip extension POT 10sec 10 bilat   Hip abd at wall 10sec 10 bilat   Bridge  2 10    Kneeling Alt Arm-Leg- EOT 0     Side lying LB rot HEP  bilat   Review of HEP 0     Hooklying TA + alt march to 90/90 0     Multifidus chop 1 10 black   TM hip extension drives 1 10 bilat   Leg press hip extension 2 10 Bilat, 10 lb/ea   Prone sh ext with scap retraction 1 10 bilat   Prone row with scap retraction 1 10 bilat   Prone scap retraction with lift 1 10 bilat   SL punch 1 10 bilat   SL ER 1 10 bilat   Shoulder press/flex/ER 1 15 Brown wand   SA punch 1 15 2 lb, bilat   Standing band row 1 15 black   Standing band sh extension 1 15 black   Side stepping ER with band 0  Bilat, green   Side stepping IR with band with eccentric control to ER 1 15 red   GH depression 10 10 [place] ball   Shoulder ER isometrics at wall 10 10    Ball roll on wall 0  Cw/ccw, white ball   Waterfalls 0  Very fatiguing, no wt Lateral wall taps 0  red   Manual Intervention (65063) Min: 35 min      Prone hip ER and anterior hip mob  4 min bilat   Prone L hip ER MET  0 min    Prone L hip anterior hip mob  0 min    Hip belt mobs bilat hip  6 min L   Prone PA lumbar mobs  10 min    STM IT band + IT band mobs  0 min    STM lumbar paraspinals  5 min    Prone PA thoracic spine  10 min          Shoulder posterior and inferior mobs, STM posterior shoulder and biceps  0 bilat                           NMR re-education (01494)   Min:      Mf Activation- re-ed      TrA Re-ed activation      Glute Max re-ed activation      Prone zandra Shane            Therapeutic Activity (05380) Min:                      Therapeutic Exercise and NMR EXR  [x] (68085) Provided verbal/tactile cueing for activities related to strengthening, flexibility, endurance, ROM  for improvements in proximal hip and core control with self care, mobility, lifting and ambulation. [x] (47599) Provided verbal/tactile cueing for activities related to improving balance, coordination, kinesthetic sense, posture, motor skill, proprioception  to assist with core control in self care, mobility, lifting, and ambulation.      Therapeutic Activities:    [] (84773 or 10697) Provided verbal/tactile cueing for activities related to improving balance, coordination, kinesthetic sense, posture, motor skill, proprioception and motor activation to allow for proper function  with self care and ADLs  [] (32573) Provided training and instruction to the patient for proper core and proximal hip recruitment and positioning with ambulation re-education     Home Exercise Program:    [x] (18078) Reviewed/Progressed HEP activities related to strengthening, flexibility, endurance, ROM of core, proximal hip and LE for functional self-care, mobility, lifting and ambulation   [] (55933) Reviewed/Progressed HEP activities related to improving balance, coordination, kinesthetic sense, posture, motor skill, proprioception of core, proximal hip and LE for self care, mobility, lifting, and ambulation      Manual Treatments:  PROM / STM / Oscillations-Mobs:  G-I, II, III, IV (PA's, Inf., Post.)  [x] (80391) Provided manual therapy to mobilize proximal hip and LS spine soft tissue/joints for the purpose of modulating pain, promoting relaxation,  increasing ROM, reducing/eliminating soft tissue swelling/inflammation/restriction, improving soft tissue extensibility and allowing for proper ROM for normal function with self care, mobility, lifting and ambulation. Modalities:       Charges:  Timed Code Treatment Minutes: 35   Total Treatment Minutes: 35     [] EVAL (LOW) 26596 (typically 20 minutes face-to-face)  [] EVAL (MOD) 02912 (typically 30 minutes face-to-face)  [] EVAL (HIGH) 54650 (typically 45 minutes face-to-face)  [] RE-EVAL     [] PQ(20839) x    [] DRY NEEDLE 1 OR 2 MUSCLES  [] NMR (49166) x     [] DRY NEEDLE 3+ MUSCLES  [x] Manual (50484) x  2   [] TA (08843) x     [] Mech Traction (24494)  [] ES(attended) (88417)     [] ES (un) (04541):   [] VASO (73322)  [] Other:    If [facility] Please Indicate Time In/Out  CPT Code Time in Time out                                     GOALS:  Patient stated goal: relieve shoulder and buttock/leg pain  [x] Progressing: [] Met: [] Not Met: [] Adjusted  Therapist goals for Patient:   Short Term Goals: To be achieved in: 2 weeks  1. Independent in HEP and progression per patient tolerance, in order to prevent re-injury. [] Progressing: [x] Met: [] Not Met: [] Adjusted  2. Patient will have a decrease in pain to facilitate improvement in movement, function, and ADLs as indicated by Functional Deficits. [] Progressing: [x] Met: [] Not Met: [] Adjusted    Long Term Goals: To be achieved in: 8 weeks  1. Disability index score of 62 or better for Foto (shoulder) and 25 or better for Dash score (Foto). Disability index score of 10 or better for RAJESH (Foto).  To assist with reaching prior level of function. [x] Progressing: [] Met: [] Not Met: [] Adjusted (MET BOLDED ITEMS)  2. Patient will demonstrate increased AROM to at least 160 degrees forward elevation of shoulder and good LS mobility, good hip ROM to allow for proper joint functioning as indicated by patients Functional Deficits. [] Progressing: [x] Met: [] Not Met: [] Adjusted  3. Patient will demonstrate an increase in Strength of bilateral shoulder girdle and good proximal hip and core activation to allow for proper functional mobility as indicated by patients Functional Deficits. [x] Progressing: [] Met: [] Not Met: [] Adjusted  4. Patient will return to lifting and reaching in functional planes with decreased painful arc of shoulder at  degrees without increased symptoms or restriction. [] Progressing: [x] Met: [] Not Met: [] Adjusted  5. Patient will report decreased lateral thigh pain with sitting and standing for greater than 30-60 minutes. (patient specific functional goal)    [] Progressing: [] Met: [x] Not Met: [] Adjusted    ASSESSMENT:  Patient with tightness noted today in lumbar and thoracic spine, as well as with bilat hip IR and L hip ER. Did well with all mobilization of spine and hip with good improvement in overall mobility. Much less soreness and tightness overall at conclusion of session. Will follow up in 2 weeks. Treatment/Activity Tolerance:  [x] Patient tolerated treatment well [] Patient limited by fatique  [] Patient limited by pain  [] Patient limited by other medical complications  [] Other:     Overall Progression Towards Functional goals/ Treatment Progress Update:  [x] Patient is progressing as expected towards functional goals listed. [] Progression is slowed due to complexities/Impairments listed. [] Progression has been slowed due to co-morbidities.   [] Plan just implemented, too soon to assess goals progression <30days   [] Goals require adjustment due to lack of progress  [] Patient is not progressing as expected and requires additional follow up with physician  [] Other:    Prognosis for POC: [x] Good [] Fair  [] Poor    Patient requires continued skilled intervention: [x] Yes  [] No        PLAN:   [x] Continue per plan of care [] Alter current plan (see comments)  [] Plan of care initiated [] Hold pending MD visit [] Discharge    Electronically signed by: Cecilia Peña PT    Note: If patient does not return for scheduled/recommended follow up visits, this note will serve as a discharge from care along with the most recent update on progress.

## 2022-12-29 ENCOUNTER — HOSPITAL ENCOUNTER (OUTPATIENT)
Dept: PHYSICAL THERAPY | Age: 77
Setting detail: THERAPIES SERIES
Discharge: HOME | End: 2022-12-29
Payer: MEDICARE

## 2022-12-29 PROCEDURE — 97140 MANUAL THERAPY 1/> REGIONS: CPT

## 2022-12-29 NOTE — FLOWSHEET NOTE
[address]  Phone: [phone]   Fax:     [phone]         Physical Therapy Treatment Note/ Progress Report:     Date:  2022      Patient Name:  Ming Drake    :  1945  MRN: [id number]  Restrictions/Precautions:    Medical/Treatment Diagnosis Information:  Diagnosis: R shoulder pain, L shoulder pain, B RTC tear, low back pain, L knee pain  Treatment Diagnosis: tear RTC M75.100, R shoulder pain M25.511, L shoulder pain M25.512, lumbar pain M54.5, L knee pain M25.562, post laminectomy syndrome V91.6  Insurance/Certification information:  PT Insurance Information: [address] [place] [address]  Medicare  Physician Information:    Dr Bassam Perkins and Dr Irving Vargas of care signed (Y/N):     Date of Patient follow up with Physician:      Progress Report: [x]  Yes  []  No     Date Range for reporting period:  Beginnin2022  Ending: -    Progress report due (10 Rx/or 30 days whichever is less):      Recertification due (POC duration/ or 90 days whichever is less):     Visit # Insurance Allowable Auth Needed   25 [employer] Medicare []Yes    []No     Pain level:  3/10 back and shoulders   Functional Scale: Lumbar Spine (Foto 70 and RAJSEH 7.5), Shoulder (Foto 61, Dash 21.7)- 2022    Lumbar spine (Foto 63, RAJESH 14%), Shoulder (Foto 70, Dash 11%)-2022        SUBJECTIVE:  Patient reports that overall his back has been feeling ok. When he squats down to get stuff from ground or has to do stuff for extended period of time which requires him to be down on ground, then he will feel more vague feeling along knees and calves, but not as much as what he had been feeling. Upper back feeling tight again today. OBJECTIVE: tender to palpate in bilateral bicep and bilat posterior shoulders.   Observation:   Test measurements:      ROM   Comments   Lumbar Flex 90 degrees Fingertips to toes   Lumbar Ext 25 degrees        ROM LEFT RIGHT Comments   Lumbar Side Bend 18 18     Lumbar Rotation limited limited     Quadrant negative negative     Hip Flexion 115 115     Hip Abd 45 45     Hip ER 50 55     Hip IR 20 15     Hip Extension limited Limited, not as greatly as L     Knee Ext WNL WNL     Knee Flex WNL WNL     Hamstring Flex full 10% limited     Piriformis 10% limited 10% limited     Vladimir test                   HIP FLEX 4+ 5    HIP ABD 5 4    HIP EXT 4+ 4+       ROM LEFT RIGHT Comments   Shoulder flexion 170 170    Shoulder abduction 140 155    Shoulder ER 60 65     Shoulder IR T4 T5                         STRENGTH LEFT RIGHT     Shoulder flexion 3/5 3/5 With pain   Shoulder scaption 3/5 3/5  with pain   Shoulder ER 4-/5 4/5     Shoulder IR 4/5 4/5        RESTRICTIONS/PRECAUTIONS: n/a    Exercises/Interventions:     Therapeutic Ex (99127)   Min: 0 sets/sec reps notes   Hip Flexor EOT 0  L   Glute max stretch (SKTC) 30 4 bilat   Piriformis stretch supine 30 4 bilat   Hip extension POT 10sec 10 bilat   Hip abd at wall 10sec 10 bilat   Bridge  2 10    Kneeling Alt Arm-Leg- EOT 0     Side lying LB rot HEP  bilat   Review of HEP 0     Hooklying TA + alt march to 90/90 0     Multifidus chop 1 10 black   TM hip extension drives 1 10 bilat   Leg press hip extension 2 10 Bilat, 10 lb/ea   Prone sh ext with scap retraction 1 10 bilat   Prone row with scap retraction 1 10 bilat   Prone scap retraction with lift 1 10 bilat   SL punch 1 10 bilat   SL ER 1 10 bilat   Shoulder press/flex/ER 1 15 Brown wand   SA punch 1 15 2 lb, bilat   Standing band row 1 15 black   Standing band sh extension 1 15 black   Side stepping ER with band 0  Bilat, green   Side stepping IR with band with eccentric control to ER 1 15 red   GH depression 10 10 [place] ball   Shoulder ER isometrics at wall 10 10    Ball roll on wall 0  Cw/ccw, white ball   Waterfalls 0  Very fatiguing, no wt   Lateral wall taps 0  red   Manual Intervention (29989) Min: 35 min      Prone hip ER and anterior hip mob  4 min bilat   Prone L hip ER MET  0 min    Prone L hip anterior hip mob  0 min    Hip belt mobs bilat hip  6 min L   Prone PA lumbar mobs  10 min    STM IT band + IT band mobs  0 min    STM lumbar paraspinals  5 min    Prone PA thoracic spine  10 min          Shoulder posterior and inferior mobs, STM posterior shoulder and biceps  0 bilat                           NMR re-education (05149)   Min:      Mf Activation- re-ed      TrA Re-ed activation      Glute Max re-ed activation      Prone zandra Shane            Therapeutic Activity (61387) Min:                      Therapeutic Exercise and NMR EXR  [x] (73526) Provided verbal/tactile cueing for activities related to strengthening, flexibility, endurance, ROM  for improvements in proximal hip and core control with self care, mobility, lifting and ambulation. [x] (61918) Provided verbal/tactile cueing for activities related to improving balance, coordination, kinesthetic sense, posture, motor skill, proprioception  to assist with core control in self care, mobility, lifting, and ambulation.      Therapeutic Activities:    [] (35380 or 50552) Provided verbal/tactile cueing for activities related to improving balance, coordination, kinesthetic sense, posture, motor skill, proprioception and motor activation to allow for proper function  with self care and ADLs  [] (03676) Provided training and instruction to the patient for proper core and proximal hip recruitment and positioning with ambulation re-education     Home Exercise Program:    [x] (87195) Reviewed/Progressed HEP activities related to strengthening, flexibility, endurance, ROM of core, proximal hip and LE for functional self-care, mobility, lifting and ambulation   [] (18338) Reviewed/Progressed HEP activities related to improving balance, coordination, kinesthetic sense, posture, motor skill, proprioception of core, proximal hip and LE for self care, mobility, lifting, and ambulation      Manual Treatments:  PROM / STM / Oscillations-Mobs:  G-I, II, III, IV (PA's, Inf., Post.)  [x] (76750) Provided manual therapy to mobilize proximal hip and LS spine soft tissue/joints for the purpose of modulating pain, promoting relaxation,  increasing ROM, reducing/eliminating soft tissue swelling/inflammation/restriction, improving soft tissue extensibility and allowing for proper ROM for normal function with self care, mobility, lifting and ambulation. Modalities:       Charges:  Timed Code Treatment Minutes: 35   Total Treatment Minutes: 35     [] EVAL (LOW) 80812 (typically 20 minutes face-to-face)  [] EVAL (MOD) 26800 (typically 30 minutes face-to-face)  [] EVAL (HIGH) 36358 (typically 45 minutes face-to-face)  [] RE-EVAL     [] XZ(76708) x    [] DRY NEEDLE 1 OR 2 MUSCLES  [] NMR (91871) x     [] DRY NEEDLE 3+ MUSCLES  [x] Manual (40321) x  2   [] TA (87990) x     [] Mech Traction (40252)  [] ES(attended) (87401)     [] ES (un) (02869):   [] VASO (59415)  [] Other:    If [facility] Please Indicate Time In/Out  CPT Code Time in Time out                                     GOALS:  Patient stated goal: relieve shoulder and buttock/leg pain  [x] Progressing: [] Met: [] Not Met: [] Adjusted  Therapist goals for Patient:   Short Term Goals: To be achieved in: 2 weeks  1. Independent in HEP and progression per patient tolerance, in order to prevent re-injury. [] Progressing: [x] Met: [] Not Met: [] Adjusted  2. Patient will have a decrease in pain to facilitate improvement in movement, function, and ADLs as indicated by Functional Deficits. [] Progressing: [x] Met: [] Not Met: [] Adjusted    Long Term Goals: To be achieved in: 8 weeks  1. Disability index score of 62 or better for Foto (shoulder) and 25 or better for Dash score (Foto). Disability index score of 10 or better for RAJESH (Foto). To assist with reaching prior level of function.  [x] Progressing: [] Met: [] Not Met: [] Adjusted (MET BOLDED ITEMS)  2. Patient will demonstrate increased AROM to at least 160 degrees forward elevation of shoulder and good LS mobility, good hip ROM to allow for proper joint functioning as indicated by patients Functional Deficits. [] Progressing: [x] Met: [] Not Met: [] Adjusted  3. Patient will demonstrate an increase in Strength of bilateral shoulder girdle and good proximal hip and core activation to allow for proper functional mobility as indicated by patients Functional Deficits. [x] Progressing: [] Met: [] Not Met: [] Adjusted  4. Patient will return to lifting and reaching in functional planes with decreased painful arc of shoulder at  degrees without increased symptoms or restriction. [] Progressing: [x] Met: [] Not Met: [] Adjusted  5. Patient will report decreased lateral thigh pain with sitting and standing for greater than 30-60 minutes. (patient specific functional goal)    [] Progressing: [] Met: [x] Not Met: [] Adjusted    ASSESSMENT:  Patient with tightness noted today in lumbar and thoracic spine, as well as with bilat hip IR. Good ROM in bilateral hip ER today. Did well with all mobilization of spine and hip with good improvement in overall mobility. Much less soreness and tightness overall at conclusion of session. IND with all stretching exercises. Will follow up in 2 weeks  after patient has further follow up with spine doc. Treatment/Activity Tolerance:  [x] Patient tolerated treatment well [] Patient limited by fatique  [] Patient limited by pain  [] Patient limited by other medical complications  [] Other:     Overall Progression Towards Functional goals/ Treatment Progress Update:  [x] Patient is progressing as expected towards functional goals listed. [] Progression is slowed due to complexities/Impairments listed. [] Progression has been slowed due to co-morbidities.   [] Plan just implemented, too soon to assess goals progression <30days   [] Goals require adjustment due to lack of progress  [] Patient is not progressing as expected and requires additional follow up with physician  [] Other:    Prognosis for POC: [x] Good [] Fair  [] Poor    Patient requires continued skilled intervention: [x] Yes  [] No        PLAN:   [x] Continue per plan of care [] Alter current plan (see comments)  [] Plan of care initiated [] Hold pending MD visit [] Discharge    Electronically signed by: Enrico Schwab, PT    Note: If patient does not return for scheduled/recommended follow up visits, this note will serve as a discharge from care along with the most recent update on progress.

## 2023-01-12 ENCOUNTER — HOSPITAL ENCOUNTER (OUTPATIENT)
Dept: PHYSICAL THERAPY | Age: 78
Setting detail: THERAPIES SERIES
Discharge: HOME | End: 2023-01-12
Payer: MEDICARE

## 2023-01-12 PROCEDURE — 97140 MANUAL THERAPY 1/> REGIONS: CPT

## 2023-01-12 NOTE — PLAN OF CARE
Todd Coronel  Phone: [phone]   Fax:     [phone]      Physical Therapy Re-Certification Plan of Care/MD UPDATE      Dear Dr Meli Grewal and Dr Radha Beasley  ,    We had the pleasure of treating the following patient for physical therapy services at [address] [place] [address]. A summary of our findings can be found in the updated assessment below. This includes our plan of care. If you have any questions or concerns regarding these findings, please do not hesitate to contact me at the office phone number checked above.   Thank you for the referral.     Physician Signature:________________________________Date:__________________  By signing above (or electronic signature), therapists plan is approved by physician    Date Range Of Visits: 6/10/2022 thru 2023  Total Visits to Date: 32  Overall Response to Treatment:   [x]Patient is responding well to treatment and improvement is noted with regards  to goals   []Patient should continue to improve in reasonable time if they continue HEP   []Patient has plateaued and is no longer responding to skilled PT intervention    []Patient is getting worse and would benefit from return to referring MD   []Patient unable to adhere to initial POC   []Other:        Physical Therapy Treatment Note/ Progress Report:     Date:  2023      Patient Name:  Stephania Reyna    :  1945  MRN: [id number]  Restrictions/Precautions:    Medical/Treatment Diagnosis Information:  Diagnosis: R shoulder pain, L shoulder pain, B RTC tear, low back pain, L knee pain  Treatment Diagnosis: tear RTC M75.100, R shoulder pain M25.511, L shoulder pain M25.512, lumbar pain M54.5, L knee pain M25.562, post laminectomy syndrome N20.2  Insurance/Certification information:  PT Insurance Information: A[employer]na Medicare  Physician Information:    Dr Meli Grewal and Dr Arian Chris of care signed (Y/N): Date of Patient follow up with Physician:      Progress Report: [x]  Yes  []  No     Date Range for reporting period:  Beginnin2022  Endin2023  /  Progress report due (10 Rx/or 30 days whichever is less):      Recertification due (POC duration/ or 90 days whichever is less):     Visit # Insurance Allowable Auth Needed   26 A[employer] Medicare []Yes    []No     Pain level:  310 back and shoulders   Functional Scale: Lumbar Spine (Foto 70 and RAJESH 7.5), Shoulder (Foto 61, Dash 21.7)- 2022    Lumbar spine (Foto 63, RAJESH 14%), Shoulder (Foto 70, Dash 11%)-2022    Lumbar spine (Foto 59, RAJESH 18%), Shoulder (Foto 60, Dash 24%)- 2023        SUBJECTIVE:  Patient reports that he had Zoom follow up with MD. Notes that his epidural injection lasted about 2 weeks, about 50% improvement. States that he will be having another epidural in March and that MD wants him to continue with PT to ensure that he maintains mobility in l/spine and hips. Will also be getting US of knees to ensure everything is working well there.                OBJECTIVE:   Observation:   Test measurements:      ROM   Comments   Lumbar Flex 90 degrees Fingertips to toes   Lumbar Ext 25 degrees        ROM LEFT RIGHT Comments   Lumbar Side Bend 18 18     Lumbar Rotation limited limited     Quadrant negative negative     Hip Flexion 115 115     Hip Abd 45 45     Hip ER 50 55     Hip IR 20 15     Hip Extension limited Limited, not as greatly as L     Knee Ext WNL WNL     Knee Flex WNL WNL     Hamstring Flex full 10% limited     Piriformis 10% limited 10% limited     Vladimir test                   HIP FLEX 4+ 5    HIP ABD 5 4    HIP EXT 4+ 4+       ROM LEFT RIGHT Comments   Shoulder flexion 170 170    Shoulder abduction 140 155    Shoulder ER 60 65     Shoulder IR T4 T5                         STRENGTH LEFT RIGHT     Shoulder flexion 3/5 3/5 With pain   Shoulder scaption 3/5 3/5  with pain   Shoulder ER 4-/5 4/5     Shoulder IR 4/5 4/5 RESTRICTIONS/PRECAUTIONS: n/a    Exercises/Interventions:     Therapeutic Ex (28790)   Min: 0 sets/sec reps notes   Hip Flexor EOT 0  L   Glute max stretch (SKTC) 30 4 bilat   Piriformis stretch supine 30 4 bilat   Hip extension POT 10sec 10 bilat   Hip abd at wall 10sec 10 bilat   Bridge  2 10    Kneeling Alt Arm-Leg- EOT 0     Side lying LB rot HEP  bilat   Review of HEP 0     Hooklying TA + alt march to 90/90 0     Multifidus chop 1 10 black   TM hip extension drives 1 10 bilat   Leg press hip extension 2 10 Bilat, 10 lb/ea   Prone sh ext with scap retraction 1 10 bilat   Prone row with scap retraction 1 10 bilat   Prone scap retraction with lift 1 10 bilat   SL punch 1 10 bilat   SL ER 1 10 bilat   Shoulder press/flex/ER 1 15 Brown wand   SA punch 1 15 2 lb, bilat   Standing band row 1 15 black   Standing band sh extension 1 15 black   Side stepping ER with band 0  Bilat, green   Side stepping IR with band with eccentric control to ER 1 15 red   GH depression 10 10 [place] ball   Shoulder ER isometrics at wall 10 10    Ball roll on wall 0  Cw/ccw, white ball   Waterfalls 0  Very fatiguing, no wt   Lateral wall taps 0  red   Manual Intervention (70489) Min: 35 min      Prone hip ER and anterior hip mob  4 min bilat   Prone L hip ER MET  0 min    Prone L hip anterior hip mob  0 min    Hip belt mobs bilat hip  6 min L   Prone PA lumbar mobs  10 min    STM IT band + IT band mobs  0 min    STM lumbar paraspinals  5 min    Prone PA thoracic spine  10 min          Shoulder posterior and inferior mobs, STM posterior shoulder and biceps  0 bilat                           NMR re-education (87412)   Min:      Mf Activation- re-ed      TrA Re-ed activation      Glute Max re-ed activation      Prone froggers      [place]            Therapeutic Activity (28849) Min:                      Therapeutic Exercise and NMR EXR  [x] (61978) Provided verbal/tactile cueing for activities related to strengthening, flexibility, endurance, ROM  for improvements in proximal hip and core control with self care, mobility, lifting and ambulation. [x] (33860) Provided verbal/tactile cueing for activities related to improving balance, coordination, kinesthetic sense, posture, motor skill, proprioception  to assist with core control in self care, mobility, lifting, and ambulation. Therapeutic Activities:    [] (85059 or 05529) Provided verbal/tactile cueing for activities related to improving balance, coordination, kinesthetic sense, posture, motor skill, proprioception and motor activation to allow for proper function  with self care and ADLs  [] (91874) Provided training and instruction to the patient for proper core and proximal hip recruitment and positioning with ambulation re-education     Home Exercise Program:    [x] (70918) Reviewed/Progressed HEP activities related to strengthening, flexibility, endurance, ROM of core, proximal hip and LE for functional self-care, mobility, lifting and ambulation   [] (78588) Reviewed/Progressed HEP activities related to improving balance, coordination, kinesthetic sense, posture, motor skill, proprioception of core, proximal hip and LE for self care, mobility, lifting, and ambulation      Manual Treatments:  PROM / STM / Oscillations-Mobs:  G-I, II, III, IV (PA's, Inf., Post.)  [x] (87865) Provided manual therapy to mobilize proximal hip and LS spine soft tissue/joints for the purpose of modulating pain, promoting relaxation,  increasing ROM, reducing/eliminating soft tissue swelling/inflammation/restriction, improving soft tissue extensibility and allowing for proper ROM for normal function with self care, mobility, lifting and ambulation.      Modalities:       Charges:  Timed Code Treatment Minutes: 35   Total Treatment Minutes: 35     [] EVAL (LOW) 46132 (typically 20 minutes face-to-face)  [] EVAL (MOD) 38212 (typically 30 minutes face-to-face)  [] EVAL (HIGH) 23845 (typically 45 minutes face-to-face)  [] RE-EVAL     [] YF(06214) x    [] DRY NEEDLE 1 OR 2 MUSCLES  [] NMR (18383) x     [] DRY NEEDLE 3+ MUSCLES  [x] Manual (13773) x  2   [] TA (62289) x     [] Mech Traction (98137)  [] ES(attended) (37720)     [] ES (un) (81086):   [] VASO (19826)  [] Other:    If [facility] Please Indicate Time In/Out  CPT Code Time in Time out                                     GOALS:  Patient stated goal: relieve shoulder and buttock/leg pain  [x] Progressing: [] Met: [] Not Met: [] Adjusted  Therapist goals for Patient:   Short Term Goals: To be achieved in: 2 weeks  1. Independent in HEP and progression per patient tolerance, in order to prevent re-injury. [] Progressing: [x] Met: [] Not Met: [] Adjusted  2. Patient will have a decrease in pain to facilitate improvement in movement, function, and ADLs as indicated by Functional Deficits. [] Progressing: [x] Met: [] Not Met: [] Adjusted    Long Term Goals: To be achieved in: 8 weeks  1. Disability index score of 62 or better for Foto (shoulder) and 25 or better for Dash score (Foto). Disability index score of 10 or better for RAJESH (Foto). To assist with reaching prior level of function. [x] Progressing: [] Met: [] Not Met: [] Adjusted (MET BOLDED ITEMS)  2. Patient will demonstrate increased AROM to at least 160 degrees forward elevation of shoulder and good LS mobility, good hip ROM to allow for proper joint functioning as indicated by patients Functional Deficits. [] Progressing: [x] Met: [] Not Met: [] Adjusted  3. Patient will demonstrate an increase in Strength of bilateral shoulder girdle and good proximal hip and core activation to allow for proper functional mobility as indicated by patients Functional Deficits. [x] Progressing: [] Met: [] Not Met: [] Adjusted  4. Patient will return to lifting and reaching in functional planes with decreased painful arc of shoulder at  degrees without increased symptoms or restriction.    [] Progressing: [x] Met: [] Not Met: [] Adjusted  5. Patient will report decreased lateral thigh pain with sitting and standing for greater than 30-60 minutes. (patient specific functional goal)    [x] Progressing: [] Met: [] Not Met: [] Adjusted    ASSESSMENT:  Patient has been seen for 9 additional visits since last progress note. Patient has been working with partner of [facility] in [place] to address back and shoulders. Patient had epidural injection in his back which helped for about 2 weeks and will be having another in March. Since injection, patient has had improved mobility in lumbar spine, as well as improved overall mobility in bilateral hips. L hip does continue with more stiffness compared to R in rotational movements. Patient tolerates PT sessions every 3 weeks in order to maintain overall mobility of l/spine and hips to allow patient to have improved pain relief as well as continued improvement in ROM, extensibility of tissues and lumbar/hip mobility. Treatment/Activity Tolerance:  [x] Patient tolerated treatment well [] Patient limited by fatique  [] Patient limited by pain  [] Patient limited by other medical complications  [] Other:     Overall Progression Towards Functional goals/ Treatment Progress Update:  [x] Patient is progressing as expected towards functional goals listed. [] Progression is slowed due to complexities/Impairments listed. [] Progression has been slowed due to co-morbidities.   [] Plan just implemented, too soon to assess goals progression <30days   [] Goals require adjustment due to lack of progress  [] Patient is not progressing as expected and requires additional follow up with physician  [] Other:    Prognosis for POC: [x] Good [] Fair  [] Poor    Patient requires continued skilled intervention: [x] Yes  [] No        PLAN:   [x] Continue per plan of care [] Alter current plan (see comments)  [] Plan of care initiated [] Hold pending MD visit [] Discharge    Electronically signed by: Luis Carlos Mooney PT    Note: If patient does not return for scheduled/recommended follow up visits, this note will serve as a discharge from care along with the most recent update on progress.

## 2023-02-01 ENCOUNTER — HOSPITAL ENCOUNTER (OUTPATIENT)
Dept: PHYSICAL THERAPY | Age: 78
Setting detail: THERAPIES SERIES
Discharge: HOME | End: 2023-02-01
Payer: MEDICARE

## 2023-02-01 PROCEDURE — 97140 MANUAL THERAPY 1/> REGIONS: CPT

## 2023-02-01 NOTE — FLOWSHEET NOTE
Todd Coronel 167  Phone: [phone]   Fax:     [phone]          Physical Therapy Treatment Note/ Progress Report:     Date:  2023      Patient Name:  Naida Delgado    :  1945  MRN: [id number]  Restrictions/Precautions:    Medical/Treatment Diagnosis Information:  Diagnosis: R shoulder pain, L shoulder pain, B RTC tear, low back pain, L knee pain  Treatment Diagnosis: tear RTC M75.100, R shoulder pain M25.511, L shoulder pain M25.512, lumbar pain M54.5, L knee pain M25.562, post laminectomy syndrome P19.9  Insurance/Certification information:  PT Insurance Information: Aetna Medicare  Physician Information:    Dr Indigo Ryder and Dr Estefany Morales of care signed (Y/N):     Date of Patient follow up with Physician:      Progress Report: [x]  Yes  []  No     Date Range for reporting period:  Beginnin2022  Endin2023    Progress report due (10 Rx/or 30 days whichever is less):      Recertification due (POC duration/ or 90 days whichever is less):     Visit # Insurance Allowable Auth Needed   27 Aetna Medicare []Yes    []No     Pain level:  3/10 back and shoulders   Functional Scale: Lumbar Spine (Foto 70 and RAJESH 7.5), Shoulder (Foto 61, Dash 21.7)- 2022    Lumbar spine (Foto 63, RAJESH 14%), Shoulder (Foto 70, Dash 11%)-2022    Lumbar spine (Foto 59, RAJESH 18%), Shoulder (Foto 60, Dash 24%)- 2023        SUBJECTIVE:  Patient reports that he will be having another epidural injection in beginning of March and then will be having US of L knee the following week to see if any specific issue with knee. Notes that he is feeling very tight in his back, as well as upper back.                 OBJECTIVE:   Observation:   Test measurements:      ROM   Comments   Lumbar Flex 90 degrees Fingertips to toes   Lumbar Ext 25 degrees        ROM LEFT RIGHT Comments   Lumbar Side Bend 18 18     Lumbar Rotation limited limited     Quadrant negative negative     Hip Flexion 115 115     Hip Abd 45 45     Hip ER 50 55     Hip IR 20 15     Hip Extension limited Limited, not as greatly as L     Knee Ext WNL WNL     Knee Flex WNL WNL     Hamstring Flex full 10% limited     Piriformis 10% limited 10% limited     Vladimir test                   HIP FLEX 4+ 5    HIP ABD 5 4    HIP EXT 4+ 4+       ROM LEFT RIGHT Comments   Shoulder flexion 170 170    Shoulder abduction 140 155    Shoulder ER 60 65     Shoulder IR T4 T5                         STRENGTH LEFT RIGHT     Shoulder flexion 3/5 3/5 With pain   Shoulder scaption 3/5 3/5  with pain   Shoulder ER 4-/5 4/5     Shoulder IR 4/5 4/5        RESTRICTIONS/PRECAUTIONS: n/a    Exercises/Interventions:     Therapeutic Ex (88762)   Min: 0 sets/sec reps notes   Hip Flexor EOT 0  L   Glute max stretch (SKTC) 30 4 bilat   Piriformis stretch supine 30 4 bilat   Hip extension POT 10sec 10 bilat   Hip abd at wall 10sec 10 bilat   Bridge  2 10    Kneeling Alt Arm-Leg- EOT 0     Side lying LB rot HEP  bilat   Review of HEP 0     Hooklying TA + alt march to 90/90 0     Multifidus chop 1 10 black   TM hip extension drives 1 10 bilat   Leg press hip extension 2 10 Bilat, 10 lb/ea   Prone sh ext with scap retraction 1 10 bilat   Prone row with scap retraction 1 10 bilat   Prone scap retraction with lift 1 10 bilat   SL punch 1 10 bilat   SL ER 1 10 bilat   Shoulder press/flex/ER 1 15 Brown wand   SA punch 1 15 2 lb, bilat   Standing band row 1 15 black   Standing band sh extension 1 15 black   Side stepping ER with band 0  Bilat, green   Side stepping IR with band with eccentric control to ER 1 15 red   GH depression 10 10 [place] ball   Shoulder ER isometrics at wall 10 10    Ball roll on wall 0  Cw/ccw, white ball   Waterfalls 0  Very fatiguing, no wt   Lateral wall taps 0  red   Manual Intervention (30551) Min: 33 min      Prone hip ER and anterior hip mob  0 min Good motion today   Prone L hip ER MET 0 min    Prone L hip anterior hip mob  0 min    Hip belt mobs bilat hip  8 min bilat   Prone PA lumbar mobs  10 min    STM IT band + IT band mobs  0 min    STM lumbar paraspinals  5 min    Prone PA thoracic spine  10 min          Shoulder posterior and inferior mobs, STM posterior shoulder and biceps  0 bilat                           NMR re-education (36699)   Min:      Mf Activation- re-ed      TrA Re-ed activation      Glute Max re-ed activation      Prone zandra Shane            Therapeutic Activity (19962) Min:                      Therapeutic Exercise and NMR EXR  [x] (68408) Provided verbal/tactile cueing for activities related to strengthening, flexibility, endurance, ROM  for improvements in proximal hip and core control with self care, mobility, lifting and ambulation. [x] (15295) Provided verbal/tactile cueing for activities related to improving balance, coordination, kinesthetic sense, posture, motor skill, proprioception  to assist with core control in self care, mobility, lifting, and ambulation.      Therapeutic Activities:    [] (46650 or 92313) Provided verbal/tactile cueing for activities related to improving balance, coordination, kinesthetic sense, posture, motor skill, proprioception and motor activation to allow for proper function  with self care and ADLs  [] (36899) Provided training and instruction to the patient for proper core and proximal hip recruitment and positioning with ambulation re-education     Home Exercise Program:    [x] (90795) Reviewed/Progressed HEP activities related to strengthening, flexibility, endurance, ROM of core, proximal hip and LE for functional self-care, mobility, lifting and ambulation   [] (98768) Reviewed/Progressed HEP activities related to improving balance, coordination, kinesthetic sense, posture, motor skill, proprioception of core, proximal hip and LE for self care, mobility, lifting, and ambulation      Manual Treatments:  PROM / STM / Oscillations-Mobs:  G-I, II, III, IV (PA's, Inf., Post.)  [x] (70021) Provided manual therapy to mobilize proximal hip and LS spine soft tissue/joints for the purpose of modulating pain, promoting relaxation,  increasing ROM, reducing/eliminating soft tissue swelling/inflammation/restriction, improving soft tissue extensibility and allowing for proper ROM for normal function with self care, mobility, lifting and ambulation. Modalities:       Charges:  Timed Code Treatment Minutes: 33   Total Treatment Minutes: 35     [] EVAL (LOW) 63193 (typically 20 minutes face-to-face)  [] EVAL (MOD) 31355 (typically 30 minutes face-to-face)  [] EVAL (HIGH) 27295 (typically 45 minutes face-to-face)  [] RE-EVAL     [] SS(47431) x    [] DRY NEEDLE 1 OR 2 MUSCLES  [] NMR (28410) x     [] DRY NEEDLE 3+ MUSCLES  [x] Manual (00378) x  2   [] TA (92729) x     [] Mech Traction (35225)  [] ES(attended) (47314)     [] ES (un) (28435):   [] VASO (12217)  [] Other:    If [facility] Please Indicate Time In/Out  CPT Code Time in Time out                                     GOALS:  Patient stated goal: relieve shoulder and buttock/leg pain  [x] Progressing: [] Met: [] Not Met: [] Adjusted  Therapist goals for Patient:   Short Term Goals: To be achieved in: 2 weeks  1. Independent in HEP and progression per patient tolerance, in order to prevent re-injury. [] Progressing: [x] Met: [] Not Met: [] Adjusted  2. Patient will have a decrease in pain to facilitate improvement in movement, function, and ADLs as indicated by Functional Deficits. [] Progressing: [x] Met: [] Not Met: [] Adjusted    Long Term Goals: To be achieved in: 8 weeks  1. Disability index score of 62 or better for Foto (shoulder) and 25 or better for Dash score (Foto). Disability index score of 10 or better for RAJESH (Foto). To assist with reaching prior level of function.  [x] Progressing: [] Met: [] Not Met: [] Adjusted (MET BOLDED ITEMS)  2. Patient will demonstrate increased AROM to at least 160 degrees forward elevation of shoulder and good LS mobility, good hip ROM to allow for proper joint functioning as indicated by patients Functional Deficits. [] Progressing: [x] Met: [] Not Met: [] Adjusted  3. Patient will demonstrate an increase in Strength of bilateral shoulder girdle and good proximal hip and core activation to allow for proper functional mobility as indicated by patients Functional Deficits. [x] Progressing: [] Met: [] Not Met: [] Adjusted  4. Patient will return to lifting and reaching in functional planes with decreased painful arc of shoulder at  degrees without increased symptoms or restriction. [] Progressing: [x] Met: [] Not Met: [] Adjusted  5. Patient will report decreased lateral thigh pain with sitting and standing for greater than 30-60 minutes. (patient specific functional goal)    [x] Progressing: [] Met: [] Not Met: [] Adjusted    ASSESSMENT:  Patient with improving bilateral hip ER mobility, still tight in bilateral hip IR though. Good improvement with hip mobilization. Tight in l/spine and t/spine. Good improvement with manual therapy and manipulation to t/spine. Good overall improvement. Will follow up again in 3 weeks in order to maintain overall mobility of l/spine and hips to allow patient to have improved pain relief as well as continued improvement in ROM, extensibility of tissues and lumbar/hip mobility. Treatment/Activity Tolerance:  [x] Patient tolerated treatment well [] Patient limited by fatique  [] Patient limited by pain  [] Patient limited by other medical complications  [] Other:     Overall Progression Towards Functional goals/ Treatment Progress Update:  [x] Patient is progressing as expected towards functional goals listed. [] Progression is slowed due to complexities/Impairments listed. [] Progression has been slowed due to co-morbidities.   [] Plan just implemented, too soon to assess goals progression <30days   [] Goals require adjustment due to lack of progress  [] Patient is not progressing as expected and requires additional follow up with physician  [] Other:    Prognosis for POC: [x] Good [] Fair  [] Poor    Patient requires continued skilled intervention: [x] Yes  [] No        PLAN:   [x] Continue per plan of care [] Alter current plan (see comments)  [] Plan of care initiated [] Hold pending MD visit [] Discharge    Electronically signed by: Bevin Duane, PT    Note: If patient does not return for scheduled/recommended follow up visits, this note will serve as a discharge from care along with the most recent update on progress.

## 2023-02-23 ENCOUNTER — HOSPITAL ENCOUNTER (OUTPATIENT)
Dept: PHYSICAL THERAPY | Age: 78
Setting detail: THERAPIES SERIES
Discharge: HOME | End: 2023-02-23
Payer: MEDICARE

## 2023-02-23 PROCEDURE — 97140 MANUAL THERAPY 1/> REGIONS: CPT

## 2023-02-23 NOTE — PLAN OF CARE
Todd Coronel  Phone: [phone]   Fax:     [phone]        Physical Therapy Re-Certification Plan of Care/MD UPDATE      Dear Dr Bettie Gallardo  ,    We had the pleasure of treating the following patient for physical therapy services at [address] [place] [address]. A summary of our findings can be found in the updated assessment below. This includes our plan of care. If you have any questions or concerns regarding these findings, please do not hesitate to contact me at the office phone number checked above. Thank you for the referral.     Physician Signature:________________________________Date:__________________  By signing above (or electronic signature), therapists plan is approved by physician    Date Range Of Visits: 2023 (6/10/2022 thru 2023)   Total Visits to Date: 1 (27 in  for back and shoulders)  Overall Response to Treatment:   []Patient is responding well to treatment and improvement is noted with regards  to goals   []Patient should continue to improve in reasonable time if they continue HEP   []Patient has plateaued and is no longer responding to skilled PT intervention    []Patient is getting worse and would benefit from return to referring MD   []Patient unable to adhere to initial POC   [x]Other: Patient was being treated by partner Dr Beth Mendoza and Dr Jose Tyler with [facility] and has since transferred to [facility] location with Dr Bettie Gallardo. New PT order received for low back. Updated POC.          Physical Therapy Treatment Note/ Progress Report:     Date:  2023      Patient Name:  Amalia Mancini    :  1945  MRN: [id number]  Restrictions/Precautions:    Medical/Treatment Diagnosis Information:  Diagnosis: spondylosis without myelopathy, lumbar stenosis, low back pain, L knee pain  Treatment Diagnosis: spondylosis without myleopathy M47.816, lumbar stenosis M48.026, lumbar pain M54.5, L knee pain M25.562, post laminectomy syndrome C72.6  Insurance/Certification information:  PT Insurance Information: A[employer]na Medicare  Physician Information:    Dr Anders Garcia of care signed (Y/N):     Date of Patient follow up with Physician:      Progress Report: [x]  Yes  []  No     Date Range for reporting period:  Beginnin2023  Ending:     Progress report due (10 Rx/or 30 days whichever is less):      Recertification due (POC duration/ or 90 days whichever is less):     Visit # Insurance Allowable Auth Needed   1 A[employer]na Medicare []Yes    []No     Pain level:  3/10 back and shoulders   Functional Scale: Lumbar Spine (Foto 70 and RAJESH 7.5), Shoulder (Foto 61, Dash 21.7)- 2022    Lumbar spine (Foto 63, RAJESH 14%), Shoulder (Foto 70, Dash 11%)-2022    Lumbar spine (Foto 59, RAJESH 18%), Shoulder (Foto 60, Dash 24%)- 2023        SUBJECTIVE:  Patient reports that he will be having another epidural injection in beginning of March and then will be having US of L knee the following week to see if any specific issue with knee. Notes that he is feeling very tight in his back, as well as upper back. Notes that his doc in [place] should have sent updated PT order.                OBJECTIVE:   Observation:   Test measurements:      ROM   Comments   Lumbar Flex 90 degrees Fingertips to toes   Lumbar Ext 25 degrees        ROM LEFT RIGHT Comments   Lumbar Side Bend 18 18     Lumbar Rotation limited limited     Quadrant negative negative     Hip Flexion 115 115     Hip Abd 45 45     Hip ER 50 55     Hip IR 20 15     Hip Extension limited Limited, not as greatly as L     Knee Ext WNL WNL     Knee Flex WNL WNL     Hamstring Flex full 10% limited     Piriformis 10% limited 10% limited     Vladimir test                   HIP FLEX 4+ 5    HIP ABD 5 4    HIP EXT 4+ 4+       ROM LEFT RIGHT Comments   Shoulder flexion 170 170    Shoulder abduction 140 155    Shoulder ER 60 65     Shoulder IR T4 T5 STRENGTH LEFT RIGHT     Shoulder flexion 3/5 3/5 With pain   Shoulder scaption 3/5 3/5  with pain   Shoulder ER 4-/5 4/5     Shoulder IR 4/5 4/5        RESTRICTIONS/PRECAUTIONS: n/a    Exercises/Interventions:     Therapeutic Ex (25472)   Min: 0 sets/sec reps notes   Hip Flexor EOT 0  L   Glute max stretch (SKTC) 30 4 bilat   Piriformis stretch supine 30 4 bilat   Hip extension POT 10sec 10 bilat   Hip abd at wall 10sec 10 bilat   Bridge  2 10    Kneeling Alt Arm-Leg- EOT 0     Side lying LB rot HEP  bilat   Review of HEP 0     Hooklying TA + alt march to 90/90 0     Multifidus chop 1 10 black   TM hip extension drives 1 10 bilat   Leg press hip extension 2 10 Bilat, 10 lb/ea   Prone sh ext with scap retraction 1 10 bilat   Prone row with scap retraction 1 10 bilat   Prone scap retraction with lift 1 10 bilat   SL punch 1 10 bilat   SL ER 1 10 bilat   Shoulder press/flex/ER 1 15 Brown wand   SA punch 1 15 2 lb, bilat   Standing band row 1 15 black   Standing band sh extension 1 15 black   Side stepping ER with band 0  Bilat, green   Side stepping IR with band with eccentric control to ER 1 15 red   GH depression 10 10 [place] ball   Shoulder ER isometrics at wall 10 10    Ball roll on wall 0  Cw/ccw, white ball   Waterfalls 0  Very fatiguing, no wt   Lateral wall taps 0  red   Manual Intervention (60405) Min: 33 min      Prone hip ER and anterior hip mob  0 min Good motion today   Prone L hip ER MET  0 min    Prone L hip anterior hip mob  0 min    Hip belt mobs bilat hip  8 min bilat   Prone PA lumbar mobs  10 min    STM IT band + IT band mobs  0 min    STM lumbar paraspinals  5 min    Prone PA thoracic spine  10 min          Shoulder posterior and inferior mobs, STM posterior shoulder and biceps  0 bilat                           NMR re-education (99789)   Min:      Mf Activation- re-ed      TrA Re-ed activation      Glute Max re-ed activation      [facility]      Romelia Edgarison Therapeutic Activity (08264) Min:                      Therapeutic Exercise and NMR EXR  [x] (24950) Provided verbal/tactile cueing for activities related to strengthening, flexibility, endurance, ROM  for improvements in proximal hip and core control with self care, mobility, lifting and ambulation. [x] (80234) Provided verbal/tactile cueing for activities related to improving balance, coordination, kinesthetic sense, posture, motor skill, proprioception  to assist with core control in self care, mobility, lifting, and ambulation. Therapeutic Activities:    [] (13857 or 85807) Provided verbal/tactile cueing for activities related to improving balance, coordination, kinesthetic sense, posture, motor skill, proprioception and motor activation to allow for proper function  with self care and ADLs  [] (87093) Provided training and instruction to the patient for proper core and proximal hip recruitment and positioning with ambulation re-education     Home Exercise Program:    [x] (34827) Reviewed/Progressed HEP activities related to strengthening, flexibility, endurance, ROM of core, proximal hip and LE for functional self-care, mobility, lifting and ambulation   [] (45351) Reviewed/Progressed HEP activities related to improving balance, coordination, kinesthetic sense, posture, motor skill, proprioception of core, proximal hip and LE for self care, mobility, lifting, and ambulation      Manual Treatments:  PROM / STM / Oscillations-Mobs:  G-I, II, III, IV (PA's, Inf., Post.)  [x] (14582) Provided manual therapy to mobilize proximal hip and LS spine soft tissue/joints for the purpose of modulating pain, promoting relaxation,  increasing ROM, reducing/eliminating soft tissue swelling/inflammation/restriction, improving soft tissue extensibility and allowing for proper ROM for normal function with self care, mobility, lifting and ambulation.      Modalities:       Charges:  Timed Code Treatment Minutes: 33   Total Treatment Minutes: 35     [] EVAL (LOW) 19851 (typically 20 minutes face-to-face)  [] EVAL (MOD) 67845 (typically 30 minutes face-to-face)  [] EVAL (HIGH) 88282 (typically 45 minutes face-to-face)  [] RE-EVAL     [] PM(15791) x    [] DRY NEEDLE 1 OR 2 MUSCLES  [] NMR (24833) x     [] DRY NEEDLE 3+ MUSCLES  [x] Manual (60743) x  2   [] TA (05008) x     [] Mech Traction (52687)  [] ES(attended) (00845)     [] ES (un) (93503):   [] VASO (36383)  [] Other:    If [facility] Please Indicate Time In/Out  CPT Code Time in Time out                                     GOALS:  Patient stated goal: relieve buttock/leg pain  [x] Progressing: [] Met: [] Not Met: [] Adjusted  Therapist goals for Patient:   Short Term Goals: To be achieved in: 2 weeks  1. Independent in HEP and progression per patient tolerance, in order to prevent re-injury. [] Progressing: [x] Met: [] Not Met: [] Adjusted  2. Patient will have a decrease in pain to facilitate improvement in movement, function, and ADLs as indicated by Functional Deficits. [] Progressing: [x] Met: [] Not Met: [] Adjusted    Long Term Goals: To be achieved in: 8 weeks  1. Disability index score of 62 or better for Foto (shoulder) and 25 or better for Dash score (Foto). Disability index score of 10 or better for RAJESH (Foto). To assist with reaching prior level of function. [x] Progressing: [] Met: [] Not Met: [] Adjusted (MET BOLDED ITEMS)  2. Patient will demonstrate increased AROM of bilateral hip IR to 40 degrees and hip ER to 45 degrees and good LS mobility, good hip ROM to allow for proper joint functioning as indicated by patients Functional Deficits. [] Progressing: [] Met: [] Not Met: [x] Adjusted  3. Patient will demonstrate an increase in proximal hip and core activation to allow for proper functional mobility as indicated by patients Functional Deficits. [] Progressing: [] Met: [] Not Met: [x] Adjusted  4.  Patient will return to bending, lifting, carrying objects less than 25-30 lbs in functional planes with without increased symptoms or restriction. [] Progressing: [] Met: [] Not Met: [x] Adjusted  5. Patient will report decreased lateral thigh pain with sitting and standing for greater than 30-60 minutes. (patient specific functional goal)    [x] Progressing: [] Met: [] Not Met: [] Adjusted    ASSESSMENT:  Patient has been following up with new doc in [place] and will be having second round of epidural injection next week, followed by US of L hip/knee the week after. Patient continues with stiffness in lumbar spine and thoracic spine, as well as decreased bilateral hip ROM. Good improvement in all areas with manual therapy. Will follow up again in 3 weeks in order to maintain overall mobility of l/spine and hips to allow patient to have improved pain relief as well as continued improvement in ROM, extensibility of tissues and lumbar/hip mobility. Treatment/Activity Tolerance:  [x] Patient tolerated treatment well [] Patient limited by fatique  [] Patient limited by pain  [] Patient limited by other medical complications  [] Other:     Overall Progression Towards Functional goals/ Treatment Progress Update:  [x] Patient is progressing as expected towards functional goals listed. [] Progression is slowed due to complexities/Impairments listed. [] Progression has been slowed due to co-morbidities.   [] Plan just implemented, too soon to assess goals progression <30days   [] Goals require adjustment due to lack of progress  [] Patient is not progressing as expected and requires additional follow up with physician  [] Other:    Prognosis for POC: [x] Good [] Fair  [] Poor    Patient requires continued skilled intervention: [x] Yes  [] No        PLAN:   [x] Continue per plan of care [] Alter current plan (see comments)  [] Plan of care initiated [] Hold pending MD visit [] Discharge    Electronically signed by: Daryle Self, PT    Note: If patient does not return for scheduled/recommended follow up visits, this note will serve as a discharge from care along with the most recent update on progress.

## 2023-03-16 ENCOUNTER — HOSPITAL ENCOUNTER (OUTPATIENT)
Dept: PHYSICAL THERAPY | Age: 78
Setting detail: THERAPIES SERIES
Discharge: HOME | End: 2023-03-16
Payer: MEDICARE

## 2023-03-16 PROCEDURE — 97140 MANUAL THERAPY 1/> REGIONS: CPT

## 2023-03-16 NOTE — FLOWSHEET NOTE
[address]  Phone: [phone]   Fax:     [phone]         Physical Therapy Treatment Note/ Progress Report:     Date:  3/16/2023      Patient Name:  Rhonda Alston    :  1945  MRN: [id number]  Restrictions/Precautions:    Medical/Treatment Diagnosis Information:  Diagnosis: spondylosis without myelopathy, lumbar stenosis, low back pain, L knee pain  Treatment Diagnosis: spondylosis without myleopathy M47.816, lumbar stenosis M48.026, lumbar pain M54.5, L knee pain M25.562, post laminectomy syndrome H15.4  Insurance/Certification information:  PT Insurance Information: Aetna Medicare  Physician Information:    Dr Hailee Thompson of care signed (Y/N):     Date of Patient follow up with Physician:      Progress Report: [x]  Yes  []  No     Date Range for reporting period:  Beginnin2023  Ending:     Progress report due (10 Rx/or 30 days whichever is less):      Recertification due (POC duration/ or 90 days whichever is less):     Visit # Insurance Allowable Auth Needed   2 A[employer]na Medicare []Yes    []No     Pain level:  3/10 back and shoulders   Functional Scale: Lumbar Spine (Foto 70 and RAJESH 7.5), Shoulder (Foto 61, Dash 21.7)- 2022    Lumbar spine (Foto 63, RAJESH 14%), Shoulder (Foto 70, Dash 11%)-2022    Lumbar spine (Foto 59, RAJESH 18%), Shoulder (Foto 60, Dash 24%)- 2023        SUBJECTIVE:  Patient reports that he has had another round of injections and back felt absolutely wonderful for 3 days. He worked on getting a lot done around his house. This has since worn off. Notes that he also had US of knees performed and no impingement was found, but doc wants him to follow with a revision specialist to ensure no underlying issues with replacement itself. Additionally, after this is done, may be getting worked up for a block and then ablation in near future.                  OBJECTIVE: Observation:   Test measurements:      ROM   Comments   Lumbar Flex 90 degrees Fingertips to toes   Lumbar Ext 25 degrees        ROM LEFT RIGHT Comments   Lumbar Side Bend 18 18     Lumbar Rotation limited limited     Quadrant negative negative     Hip Flexion 115 115     Hip Abd 45 45     Hip ER 50 55     Hip IR 20 15     Hip Extension limited Limited, not as greatly as L     Knee Ext WNL WNL     Knee Flex WNL WNL     Hamstring Flex full 10% limited     Piriformis 10% limited 10% limited     Vladimir test                   HIP FLEX 4+ 5    HIP ABD 5 4    HIP EXT 4+ 4+       ROM LEFT RIGHT Comments   Shoulder flexion 170 170    Shoulder abduction 140 155    Shoulder ER 60 65     Shoulder IR T4 T5                         STRENGTH LEFT RIGHT     Shoulder flexion 3/5 3/5 With pain   Shoulder scaption 3/5 3/5  with pain   Shoulder ER 4-/5 4/5     Shoulder IR 4/5 4/5        RESTRICTIONS/PRECAUTIONS: n/a    Exercises/Interventions:     Therapeutic Ex (27144)   Min: 0 sets/sec reps notes   Hip Flexor EOT 0  L   Glute max stretch (SKTC) 30 4 bilat   Piriformis stretch supine 30 4 bilat   Hip extension POT 10sec 10 bilat   Hip abd at wall 10sec 10 bilat   Bridge  2 10    Kneeling Alt Arm-Leg- EOT 0     Side lying LB rot HEP  bilat   Review of HEP 0     Hooklying TA + alt march to 90/90 0     Multifidus chop 1 10 black   TM hip extension drives 1 10 bilat   Leg press hip extension 2 10 Bilat, 10 lb/ea   Prone sh ext with scap retraction 1 10 bilat   Prone row with scap retraction 1 10 bilat   Prone scap retraction with lift 1 10 bilat   SL punch 1 10 bilat   SL ER 1 10 bilat   Shoulder press/flex/ER 1 15 Brown wand   SA punch 1 15 2 lb, bilat   Standing band row 1 15 black   Standing band sh extension 1 15 black   Side stepping ER with band 0  Bilat, green   Side stepping IR with band with eccentric control to ER 1 15 red   GH depression 10 10 [place] ball   Shoulder ER isometrics at wall 10 10    Ball roll on wall 0  Cw/ccw, white ball   Waterfalls 0  Very fatiguing, no wt   Lateral wall taps 0  red   Manual Intervention (42252) Min: 33 min      Prone hip ER and anterior hip mob  0 min Good motion today   Prone L hip ER MET  0 min    Prone L hip anterior hip mob  0 min    Hip belt mobs bilat hip  8 min bilat   Prone PA lumbar mobs  10 min    STM IT band + IT band mobs  0 min    STM lumbar paraspinals  5 min    Prone PA thoracic spine  10 min          Shoulder posterior and inferior mobs, STM posterior shoulder and biceps  0 bilat                           NMR re-education (25581)   Min:      Mf Activation- re-ed      TrA Re-ed activation      Glute Max re-ed activation      Prone froggers      [place]            Therapeutic Activity (30036) Min:                      Therapeutic Exercise and NMR EXR  [x] (10637) Provided verbal/tactile cueing for activities related to strengthening, flexibility, endurance, ROM  for improvements in proximal hip and core control with self care, mobility, lifting and ambulation. [x] (03207) Provided verbal/tactile cueing for activities related to improving balance, coordination, kinesthetic sense, posture, motor skill, proprioception  to assist with core control in self care, mobility, lifting, and ambulation.      Therapeutic Activities:    [] (43479 or 33180) Provided verbal/tactile cueing for activities related to improving balance, coordination, kinesthetic sense, posture, motor skill, proprioception and motor activation to allow for proper function  with self care and ADLs  [] (46858) Provided training and instruction to the patient for proper core and proximal hip recruitment and positioning with ambulation re-education     Home Exercise Program:    [x] (13990) Reviewed/Progressed HEP activities related to strengthening, flexibility, endurance, ROM of core, proximal hip and LE for functional self-care, mobility, lifting and ambulation   [] (50035) Reviewed/Progressed HEP activities related to improving balance, coordination, kinesthetic sense, posture, motor skill, proprioception of core, proximal hip and LE for self care, mobility, lifting, and ambulation      Manual Treatments:  PROM / STM / Oscillations-Mobs:  G-I, II, III, IV (PA's, Inf., Post.)  [x] (58020) Provided manual therapy to mobilize proximal hip and LS spine soft tissue/joints for the purpose of modulating pain, promoting relaxation,  increasing ROM, reducing/eliminating soft tissue swelling/inflammation/restriction, improving soft tissue extensibility and allowing for proper ROM for normal function with self care, mobility, lifting and ambulation. Modalities:       Charges:  Timed Code Treatment Minutes: 33   Total Treatment Minutes: 35     [] EVAL (LOW) 74779 (typically 20 minutes face-to-face)  [] EVAL (MOD) 21102 (typically 30 minutes face-to-face)  [] EVAL (HIGH) 52153 (typically 45 minutes face-to-face)  [] RE-EVAL     [] TS(80192) x    [] DRY NEEDLE 1 OR 2 MUSCLES  [] NMR (76584) x     [] DRY NEEDLE 3+ MUSCLES  [x] Manual (23904) x  2   [] TA (38335) x     [] Mech Traction (64117)  [] ES(attended) (28352)     [] ES (un) (71994):   [] VASO (70674)  [] Other:    If [facility] Please Indicate Time In/Out  CPT Code Time in Time out                                     GOALS:  Patient stated goal: relieve buttock/leg pain  [x] Progressing: [] Met: [] Not Met: [] Adjusted  Therapist goals for Patient:   Short Term Goals: To be achieved in: 2 weeks  1. Independent in HEP and progression per patient tolerance, in order to prevent re-injury. [] Progressing: [x] Met: [] Not Met: [] Adjusted  2. Patient will have a decrease in pain to facilitate improvement in movement, function, and ADLs as indicated by Functional Deficits. [] Progressing: [x] Met: [] Not Met: [] Adjusted    Long Term Goals: To be achieved in: 8 weeks  1. Disability index score of 62 or better for Foto (shoulder) and 25 or better for Dash score (Foto).  Disability index score of 10 or better for RAJESH (Foto). To assist with reaching prior level of function. [x] Progressing: [] Met: [] Not Met: [] Adjusted (MET BOLDED ITEMS)  2. Patient will demonstrate increased AROM of bilateral hip IR to 40 degrees and hip ER to 45 degrees and good LS mobility, good hip ROM to allow for proper joint functioning as indicated by patients Functional Deficits. [] Progressing: [] Met: [] Not Met: [x] Adjusted  3. Patient will demonstrate an increase in proximal hip and core activation to allow for proper functional mobility as indicated by patients Functional Deficits. [] Progressing: [] Met: [] Not Met: [x] Adjusted  4. Patient will return to bending, lifting, carrying objects less than 25-30 lbs in functional planes with without increased symptoms or restriction. [] Progressing: [] Met: [] Not Met: [x] Adjusted  5. Patient will report decreased lateral thigh pain with sitting and standing for greater than 30-60 minutes. (patient specific functional goal)    [x] Progressing: [] Met: [] Not Met: [] Adjusted    ASSESSMENT:  Patient continues with tightness in t/spine and L/spine with good improvement in mobility with manual therapy. Bilateral hip ER has normalized out more, but still limited in bilateral hip IR. Good improvement in overall hip mobility. Good improvement in all areas with manual therapy. Will follow up again in 3 weeks in order to maintain overall mobility of l/spine and hips to allow patient to have improved pain relief as well as continued improvement in ROM, extensibility of tissues and lumbar/hip mobility. Treatment/Activity Tolerance:  [x] Patient tolerated treatment well [] Patient limited by fatique  [] Patient limited by pain  [] Patient limited by other medical complications  [] Other:     Overall Progression Towards Functional goals/ Treatment Progress Update:  [x] Patient is progressing as expected towards functional goals listed.     [] Progression is slowed due to complexities/Impairments listed. [] Progression has been slowed due to co-morbidities. [] Plan just implemented, too soon to assess goals progression <30days   [] Goals require adjustment due to lack of progress  [] Patient is not progressing as expected and requires additional follow up with physician  [] Other:    Prognosis for POC: [x] Good [] Fair  [] Poor    Patient requires continued skilled intervention: [x] Yes  [] No        PLAN:   [x] Continue per plan of care [] Alter current plan (see comments)  [] Plan of care initiated [] Hold pending MD visit [] Discharge    Electronically signed by: Nikky Kramer PT    Note: If patient does not return for scheduled/recommended follow up visits, this note will serve as a discharge from care along with the most recent update on progress.

## 2023-04-05 ENCOUNTER — HOSPITAL ENCOUNTER (OUTPATIENT)
Dept: PHYSICAL THERAPY | Age: 78
Setting detail: THERAPIES SERIES
Discharge: HOME | End: 2023-04-05
Payer: MEDICARE

## 2023-04-05 PROCEDURE — 97140 MANUAL THERAPY 1/> REGIONS: CPT

## 2023-04-05 NOTE — FLOWSHEET NOTE
mob  0 min    Hip belt mobs bilat hip  8 min bilat   Prone PA lumbar mobs  10 min    STM IT band + IT band mobs  0 min    STM lumbar paraspinals  5 min    Prone PA thoracic spine  10 min          Shoulder posterior and inferior mobs, STM posterior shoulder and biceps  0 bilat                           NMR re-education (50668)   Min:      Mf Activation- re-ed      TrA Re-ed activation      Glute Max re-ed activation      Prone zandra Shane            Therapeutic Activity (36967) Min:                      Therapeutic Exercise and NMR EXR  [x] (66421) Provided verbal/tactile cueing for activities related to strengthening, flexibility, endurance, ROM  for improvements in proximal hip and core control with self care, mobility, lifting and ambulation. [x] (50514) Provided verbal/tactile cueing for activities related to improving balance, coordination, kinesthetic sense, posture, motor skill, proprioception  to assist with core control in self care, mobility, lifting, and ambulation.      Therapeutic Activities:    [] (57249 or 02864) Provided verbal/tactile cueing for activities related to improving balance, coordination, kinesthetic sense, posture, motor skill, proprioception and motor activation to allow for proper function  with self care and ADLs  [] (37136) Provided training and instruction to the patient for proper core and proximal hip recruitment and positioning with ambulation re-education     Home Exercise Program:    [x] (00444) Reviewed/Progressed HEP activities related to strengthening, flexibility, endurance, ROM of core, proximal hip and LE for functional self-care, mobility, lifting and ambulation   [] (07919) Reviewed/Progressed HEP activities related to improving balance, coordination, kinesthetic sense, posture, motor skill, proprioception of core, proximal hip and LE for self care, mobility, lifting, and ambulation      Manual Treatments:  PROM / STM / Oscillations-Mobs:  G-I, II, III, IV

## 2023-04-26 ENCOUNTER — HOSPITAL ENCOUNTER (OUTPATIENT)
Dept: PHYSICAL THERAPY | Age: 78
Setting detail: THERAPIES SERIES
Discharge: HOME | End: 2023-04-26
Payer: MEDICARE

## 2023-04-26 PROCEDURE — 97140 MANUAL THERAPY 1/> REGIONS: CPT

## 2023-04-26 NOTE — FLOWSHEET NOTE
mobilize proximal hip and LS spine soft tissue/joints for the purpose of modulating pain, promoting relaxation,  increasing ROM, reducing/eliminating soft tissue swelling/inflammation/restriction, improving soft tissue extensibility and allowing for proper ROM for normal function with self care, mobility, lifting and ambulation. Modalities:       Charges:  Timed Code Treatment Minutes: 33   Total Treatment Minutes: 35     [] EVAL (LOW) 61601 (typically 20 minutes face-to-face)  [] EVAL (MOD) 33096 (typically 30 minutes face-to-face)  [] EVAL (HIGH) 25267 (typically 45 minutes face-to-face)  [] RE-EVAL     [] PG(88272) x    [] DRY NEEDLE 1 OR 2 MUSCLES  [] NMR (60688) x     [] DRY NEEDLE 3+ MUSCLES  [x] Manual (10080) x  2   [] TA (81105) x     [] Mech Traction (29996)  [] ES(attended) (61626)     [] ES (un) (65369):   [] VASO (12839)  [] Other:    If [facility] Please Indicate Time In/Out  CPT Code Time in Time out                                     GOALS:  Patient stated goal: relieve buttock/leg pain  [x] Progressing: [] Met: [] Not Met: [] Adjusted  Therapist goals for Patient:   Short Term Goals: To be achieved in: 2 weeks  1. Independent in HEP and progression per patient tolerance, in order to prevent re-injury. [] Progressing: [x] Met: [] Not Met: [] Adjusted  2. Patient will have a decrease in pain to facilitate improvement in movement, function, and ADLs as indicated by Functional Deficits. [] Progressing: [x] Met: [] Not Met: [] Adjusted    Long Term Goals: To be achieved in: 8 weeks  1. Disability index score of 62 or better for Foto (shoulder) and 25 or better for Dash score (Foto). Disability index score of 10 or better for RAJESH (Foto). To assist with reaching prior level of function.  [x] Progressing: [] Met: [] Not Met: [] Adjusted (MET BOLDED ITEMS)  2. Patient will demonstrate increased AROM of bilateral hip IR to 40 degrees and hip ER to 45 degrees and good LS mobility, good hip ROM to allow for

## 2023-05-17 ENCOUNTER — HOSPITAL ENCOUNTER (OUTPATIENT)
Dept: PHYSICAL THERAPY | Age: 78
Setting detail: THERAPIES SERIES
Discharge: HOME | End: 2023-05-17
Payer: MEDICARE

## 2023-05-17 PROCEDURE — 97140 MANUAL THERAPY 1/> REGIONS: CPT

## 2023-05-17 NOTE — FLOWSHEET NOTE
ROM to allow for proper joint functioning as indicated by patients Functional Deficits. [] Progressing: [] Met: [] Not Met: [x] Adjusted  3. Patient will demonstrate an increase in proximal hip and core activation to allow for proper functional mobility as indicated by patients Functional Deficits. [] Progressing: [] Met: [] Not Met: [x] Adjusted  4. Patient will return to bending, lifting, carrying objects less than 25-30 lbs in functional planes with without increased symptoms or restriction. [] Progressing: [] Met: [] Not Met: [x] Adjusted  5. Patient will report decreased lateral thigh pain with sitting and standing for greater than 30-60 minutes. (patient specific functional goal)    [x] Progressing: [] Met: [] Not Met: [] Adjusted    ASSESSMENT:  Patient continues with tightness in t/spine and L/spine with good improvement in mobility with manual therapy. Bilateral hip ER has normalized out more, but still limited in bilateral hip IR. Good improvement in overall hip mobility. Good improvement in all areas with manual therapy. Will follow up again in 3 weeks in order to maintain overall mobility of l/spine and hips to allow patient to have improved pain relief as well as continued improvement in ROM, extensibility of tissues and lumbar/hip mobility. MEDICAL NECESSITY DOCUMENTATION     I certify that this patient meets one of the below criteria necessary for medical necessity for care and/or justification of Medicare therapy services:     [] The patient has a condition identified by an ICD-10 code that has a direct and significant impact on the need for therapy. (Significantly impacts the rate of recovery.)                                     [] The patient has a complexity identified by an ICD-10 code that has a direct and significant impact on the need for therapy.   (Significantly impacts the rate of recovery and is associated with a primary condition.)                                 [] The

## 2023-06-07 ENCOUNTER — HOSPITAL ENCOUNTER (OUTPATIENT)
Dept: PHYSICAL THERAPY | Age: 78
Setting detail: THERAPIES SERIES
Discharge: HOME | End: 2023-06-07
Payer: MEDICARE

## 2023-06-07 NOTE — FLOWSHEET NOTE
[facility]    Physical Therapy  Cancellation/No-show Note  Patient Name:  Char Greenwood  :  1945   Date:  2023  Cancelled visits to date: 0  No-shows to date: 1    For today's appointment patient:  []  Cancelled  [x]  Rescheduled appointment  [x]  No-show     Reason given by patient:  []  Patient ill  []  Conflicting appointment  []  No transportation    []  Conflict with work  []  No reason given  [x]  Other:     Comments:  called pt, forgot about appt.  Rescheduled for tomorrow    Electronically signed by:  Av Dsouza, PT, DPT

## 2023-06-08 ENCOUNTER — HOSPITAL ENCOUNTER (OUTPATIENT)
Dept: PHYSICAL THERAPY | Age: 78
Setting detail: THERAPIES SERIES
Discharge: HOME | End: 2023-06-08
Payer: MEDICARE

## 2023-06-08 PROCEDURE — 97140 MANUAL THERAPY 1/> REGIONS: CPT

## 2023-06-08 NOTE — FLOWSHEET NOTE
[facility] [address]  Phone: [phone]   Fax:     [phone]         Physical Therapy Treatment Note/ Progress Report:     Date:  2023      Patient Name:  Agustina Ang    :  1945  MRN: [id number]  Restrictions/Precautions:    Medical/Treatment Diagnosis Information:  Diagnosis: spondylosis without myelopathy, lumbar stenosis, low back pain, L knee pain  Treatment Diagnosis: spondylosis without myleopathy M47.816, lumbar stenosis M48.026, lumbar pain M54.5, L knee pain M25.562, post laminectomy syndrome L16.8  Insurance/Certification information:  PT Insurance Information: Aetna Medicare  Physician Information:    Dr Keron Martinez of care signed (Y/N):     Date of Patient follow up with Physician:      Progress Report: [x]  Yes  []  No     Date Range for reporting period:  Beginnin2023  Ending:     Progress report due (10 Rx/or 30 days whichever is less):      Recertification due (POC duration/ or 90 days whichever is less):     Visit # Insurance Allowable Auth Needed   6  A[employer]na Medicare []Yes    []No     Pain level:  3/10 back and shoulders   Functional Scale: Lumbar Spine (Foto 70 and RAJESH 7.5), Shoulder (Foto 61, Dash 21.7)- 2022    Lumbar spine (Foto 63, RAJESH 14%), Shoulder (Foto 70, Dash 11%)-2022    Lumbar spine (Foto 59, RAJEHS 18%), Shoulder (Foto 60, Dash 24%)- 2023        SUBJECTIVE:  Patient reports that he has been doing a lot of work outside with tractor and having more tightness and pain into upper back and knees. Will be having telehealth with doc next week about next injection for back.                     OBJECTIVE:   Observation:   Test measurements:      ROM   Comments   Lumbar Flex 90 degrees Fingertips to toes   Lumbar Ext 25 degrees        ROM LEFT RIGHT Comments   Lumbar Side Bend 18 18     Lumbar Rotation limited limited     Quadrant negative negative     Hip

## 2023-06-28 ENCOUNTER — HOSPITAL ENCOUNTER (OUTPATIENT)
Dept: PHYSICAL THERAPY | Age: 78
Setting detail: THERAPIES SERIES
Discharge: HOME | End: 2023-06-28
Payer: MEDICARE

## 2023-06-28 PROCEDURE — 97140 MANUAL THERAPY 1/> REGIONS: CPT

## 2023-07-19 ENCOUNTER — HOSPITAL ENCOUNTER (OUTPATIENT)
Dept: PHYSICAL THERAPY | Age: 78
Setting detail: THERAPIES SERIES
Discharge: HOME | End: 2023-07-19
Payer: MEDICARE

## 2023-07-19 PROCEDURE — 97140 MANUAL THERAPY 1/> REGIONS: CPT

## 2023-07-19 NOTE — FLOWSHEET NOTE
[address], [address] [place] [address]  Phone: [phone]   Fax:     [phone]         Physical Therapy Treatment Note/ Progress Report:     Date:  2023      Patient Name:  Lisa Sinha    :  1945  MRN: [id number]  Restrictions/Precautions:    Medical/Treatment Diagnosis Information:  Diagnosis: spondylosis without myelopathy, lumbar stenosis, low back pain, L knee pain  Treatment Diagnosis: spondylosis without myleopathy M47.816, lumbar stenosis M48.026, lumbar pain M54.5, L knee pain M25.562, post laminectomy syndrome C32.7  Insurance/Certification information:  PT Insurance Information: Aetna Medicare  Physician Information:    Dr Libby Carrillo of care signed (Y/N):     Date of Patient follow up with Physician:      Progress Report: [x]  Yes  []  No     Date Range for reporting period:  Beginnin2023  Ending:     Progress report due (10 Rx/or 30 days whichever is less):      Recertification due (POC duration/ or 90 days whichever is less):     Visit # Insurance Allowable Auth Needed   8  Aetna Medicare []Yes    []No     Pain level:  3/10 back and shoulders   Functional Scale: Lumbar Spine (Foto 70 and RAJESH 7.5), Shoulder (Foto 61, Dash 21.7)- 2022    Lumbar spine (Foto 63, RAJESH 14%), Shoulder (Foto 70, Dash 11%)-2022    Lumbar spine (Foto 59, RAEJSH 18%), Shoulder (Foto 60, Dash 24%)- 2023        SUBJECTIVE:  Patient reports that he has been feeling ok. Back and hips are feeling tight. Delayed his genicular block until 8/3.                        OBJECTIVE:   Observation:   Test measurements:      ROM   Comments   Lumbar Flex 90 degrees Fingertips to toes   Lumbar Ext 25 degrees        ROM LEFT RIGHT Comments   Lumbar Side Bend 18 18     Lumbar Rotation limited limited     Quadrant negative negative     Hip Flexion 115 115     Hip Abd 45 45     Hip ER 50 55     Hip IR 20 15     Hip Extension limited

## 2023-08-09 ENCOUNTER — HOSPITAL ENCOUNTER (OUTPATIENT)
Dept: PHYSICAL THERAPY | Age: 78
Setting detail: THERAPIES SERIES
Discharge: HOME | End: 2023-08-09
Payer: MEDICARE

## 2023-08-09 PROCEDURE — 97140 MANUAL THERAPY 1/> REGIONS: CPT

## 2023-08-09 NOTE — FLOWSHEET NOTE
[address], [address] [place] [address]  Phone: [phone]   Fax:     [phone]         Physical Therapy Treatment Note/ Progress Report:     Date:  2023      Patient Name:  Jorge Harrell    :  1945  MRN: [id number]  Restrictions/Precautions:    Medical/Treatment Diagnosis Information:  Diagnosis: spondylosis without myelopathy, lumbar stenosis, low back pain, L knee pain  Treatment Diagnosis: spondylosis without myleopathy M47.816, lumbar stenosis M48.026, lumbar pain M54.5, L knee pain M25.562, post laminectomy syndrome A21.7  Insurance/Certification information:  PT Insurance Information: Aetna Medicare  Physician Information:    Dr Dee Dee Valladares of care signed (Y/N):     Date of Patient follow up with Physician:      Progress Report: [x]  Yes  []  No     Date Range for reporting period:  Beginnin2023  Ending:     Progress report due (10 Rx/or 30 days whichever is less):      Recertification due (POC duration/ or 90 days whichever is less):     Visit # Insurance Allowable Auth Needed   9  A[employer]na Medicare []Yes    []No     Pain level:  3/10 back and shoulders   Functional Scale: Lumbar Spine (Foto 70 and RAJESH 7.5), Shoulder (Foto 61, Dash 21.7)- 2022    Lumbar spine (Foto 63, RAJESH 14%), Shoulder (Foto 70, Dash 11%)-2022    Lumbar spine (Foto 59, RAJESH 18%), Shoulder (Foto 60, Dash 24%)- 2023        SUBJECTIVE:  Patient reports that back and hips have been more tight recently. Had ablation of L knee 1 week ago- notes improvement isn't expected until at least 2-4 weeks after. If this is helpful, they will skip the injections in the R knee and go straight to the ablation. His next f/u with MD is 2023.                        OBJECTIVE:   Observation:   Test measurements:      ROM   Comments   Lumbar Flex 90 degrees Fingertips to toes   Lumbar Ext 25 degrees        ROM LEFT RIGHT Comments   Lumbar Side

## 2023-08-30 ENCOUNTER — APPOINTMENT (OUTPATIENT)
Dept: PHYSICAL THERAPY | Age: 78
End: 2023-08-30
Payer: MEDICARE

## 2023-09-13 ENCOUNTER — HOSPITAL ENCOUNTER (OUTPATIENT)
Dept: PHYSICAL THERAPY | Age: 78
Setting detail: THERAPIES SERIES
Discharge: HOME | End: 2023-09-13
Payer: MEDICARE

## 2023-09-13 PROCEDURE — 97140 MANUAL THERAPY 1/> REGIONS: CPT

## 2023-09-13 NOTE — PLAN OF CARE
Progressing: [] Met: [] Not Met: [] Adjusted  3. Patient will demonstrate an increase in proximal hip and core activation to allow for proper functional mobility as indicated by patients Functional Deficits. [] Progressing: [x] Met: [] Not Met: [] Adjusted  4. Patient will return to bending, lifting, carrying objects less than 25-30 lbs in functional planes with without increased symptoms or restriction. [x] Progressing: [] Met: [] Not Met: [] Adjusted  5. Patient will report decreased lateral thigh pain with sitting and standing for greater than 30-60 minutes. (patient specific functional goal)    [x] Progressing: [] Met: [] Not Met: [] Adjusted    ASSESSMENT:  Patient has been seen for 10 visits to continue to work on improving hip and l/spine ROM/extensibility. Continues with tightness in t/spine and L/spine, as well as with IR bilaterally in hips. Having improvement in L knee pain with RF ablation and will be having the R knee done in Nov. Will continue to follow up every 3 weeks to to maintain overall mobility of l/spine and hips to allow patient to have improved pain relief as well as continued improvement in ROM, extensibility of tissues and lumbar/hip mobility. MEDICAL NECESSITY DOCUMENTATION     I certify that this patient meets one of the below criteria necessary for medical necessity for care and/or justification of Medicare therapy services:     [x] The patient has a condition identified by an ICD-10 code that has a direct and significant impact on the need for therapy. (Significantly impacts the rate of recovery.)                                     [] The patient has a complexity identified by an ICD-10 code that has a direct and significant impact on the need for therapy.   (Significantly impacts the rate of recovery and is associated with a primary condition.)                                 [] The patient has associated variables that influence the amount of treatment to include:

## 2023-10-04 ENCOUNTER — HOSPITAL ENCOUNTER (OUTPATIENT)
Dept: PHYSICAL THERAPY | Age: 78
Setting detail: THERAPIES SERIES
Discharge: HOME | End: 2023-10-04
Payer: MEDICARE

## 2023-10-04 PROCEDURE — 97140 MANUAL THERAPY 1/> REGIONS: CPT

## 2023-10-04 NOTE — FLOWSHEET NOTE
[address], [address] [place] [address]  Phone: [phone]   Fax:     [phone]           Physical Therapy Treatment Note/ Progress Report:     Date:  10/4/2023      Patient Name:  Snow Grady    :  1945  MRN: [id number]  Restrictions/Precautions:    Medical/Treatment Diagnosis Information:  Diagnosis: spondylosis without myelopathy, lumbar stenosis, low back pain, L knee pain  Treatment Diagnosis: spondylosis without myleopathy M47.816, lumbar stenosis M48.026, lumbar pain M54.5, L knee pain M25.562, post laminectomy syndrome Q00.7  Insurance/Certification information:  PT Insurance Information: Aetna Medicare  Physician Information:    Dr Lyubov Briseno of care signed (Y/N):     Date of Patient follow up with Physician:      Progress Report: [x]  Yes  []  No     Date Range for reporting period:  Beginnin2023  Ending:     Progress report due (10 Rx/or 30 days whichever is less):      Recertification due (POC duration/ or 90 days whichever is less):     Visit # Insurance Allowable Auth Needed   11  A[employer]na Medicare []Yes    []No     Pain level:  3/10 back and shoulders   Functional Scale: Lumbar Spine (Foto 70 and RAJESH 7.5), Shoulder (Foto 61, Dash 21.7)- 2022    Lumbar spine (Foto 63, RAJESH 14%), Shoulder (Foto 70, Dash 11%)-2022    Lumbar spine (Foto 59, RAJESH 18%), Shoulder (Foto 60, Dash 24%)- 2023        SUBJECTIVE:  Patient reports that his L knee continues to feel better than his R, so hopeful that his radiofrequency ablation that will be done on his R knee will be helpful as well. Reports that patellar tendon area that he hurt while out of town has been feeling improved as well.                      OBJECTIVE:   Observation:   Test measurements:      ROM   Comments   Lumbar Flex 90 degrees Fingertips to toes   Lumbar Ext 25 degrees        ROM LEFT RIGHT Comments   Lumbar Side Bend 18 18     Lumbar

## 2023-11-02 ENCOUNTER — APPOINTMENT (OUTPATIENT)
Dept: PHYSICAL THERAPY | Age: 78
End: 2023-11-02
Payer: MEDICARE

## 2023-11-08 ENCOUNTER — HOSPITAL ENCOUNTER (OUTPATIENT)
Dept: PHYSICAL THERAPY | Age: 78
Setting detail: THERAPIES SERIES
Discharge: HOME | End: 2023-11-08
Payer: MEDICARE

## 2023-11-08 PROCEDURE — 97140 MANUAL THERAPY 1/> REGIONS: CPT

## 2023-11-08 NOTE — FLOWSHEET NOTE
[address], [address] [place] [address]  Phone: [phone]   Fax:     [phone]           Physical Therapy Treatment Note/ Progress Report:     Date:  2023      Patient Name:  Edward Hernandez    :  1945  MRN: [id number]  Restrictions/Precautions:    Medical/Treatment Diagnosis Information:  Diagnosis: spondylosis without myelopathy, lumbar stenosis, low back pain, L knee pain  Treatment Diagnosis: spondylosis without myleopathy M47.816, lumbar stenosis M48.026, lumbar pain M54.5, L knee pain M25.562, post laminectomy syndrome H22.3  Insurance/Certification information:  PT Insurance Information: Aetna Medicare  Physician Information:    Dr Radha Lao of care signed (Y/N):     Date of Patient follow up with Physician:      Progress Report: [x]  Yes  []  No     Date Range for reporting period:  Beginnin2023  Ending:     Progress report due (10 Rx/or 30 days whichever is less):      Recertification due (POC duration/ or 90 days whichever is less):     Visit # Insurance Allowable Auth Needed   12  A[employer]na Medicare []Yes    []No     Pain level:  3/10 back and shoulders   Functional Scale: Lumbar Spine (Foto 70 and RAJESH 7.5), Shoulder (Foto 61, Dash 21.7)- 2022    Lumbar spine (Foto 63, RAJESH 14%), Shoulder (Foto 70, Dash 11%)-2022    Lumbar spine (Foto 59, RAJESH 18%), Shoulder (Foto 60, Dash 24%)- 2023        SUBJECTIVE:  Patient reports that his L knee continues to feel better than his R, so hopeful that his radiofrequency ablation that will be done on his R knee will be helpful as well. Will be having RFA on . Has a little tightness in hips and upper back and has been trying to work on this at home.                      OBJECTIVE:   Observation:   Test measurements:      ROM   Comments   Lumbar Flex 90 degrees Fingertips to toes   Lumbar Ext 25 degrees        ROM LEFT RIGHT Comments   Lumbar Side Bend

## 2023-11-30 ENCOUNTER — APPOINTMENT (OUTPATIENT)
Dept: PHYSICAL THERAPY | Age: 78
End: 2023-11-30
Payer: MEDICARE

## 2023-12-05 ENCOUNTER — HOSPITAL ENCOUNTER (OUTPATIENT)
Dept: PHYSICAL THERAPY | Age: 78
Setting detail: THERAPIES SERIES
Discharge: HOME | End: 2023-12-05
Payer: MEDICARE

## 2023-12-05 PROCEDURE — 97140 MANUAL THERAPY 1/> REGIONS: CPT

## 2023-12-05 NOTE — PLAN OF CARE
complexities/Impairments listed. [] Progression has been slowed due to co-morbidities. [] Plan just implemented, too soon to assess goals progression <30days   [] Goals require adjustment due to lack of progress  [] Patient is not progressing as expected and requires additional follow up with physician  [] Other:    Prognosis for POC: [x] Good [] Fair  [] Poor    Patient requires continued skilled intervention: [x] Yes  [] No        PLAN: D/c to HEP  [] Continue per plan of care [] Alter current plan (see comments)  [] Plan of care initiated [] Hold pending MD visit [x] Discharge    Electronically signed by: Meredith Ellis PT    Note: If patient does not return for scheduled/recommended follow up visits, this note will serve as a discharge from care along with the most recent update on progress.

## 2024-05-13 ENCOUNTER — HOSPITAL ENCOUNTER (OUTPATIENT)
Dept: PHYSICAL THERAPY | Age: 79
Setting detail: THERAPIES SERIES
Discharge: HOME | End: 2024-05-13
Payer: MEDICARE

## 2024-05-13 PROCEDURE — 97110 THERAPEUTIC EXERCISES: CPT

## 2024-05-13 PROCEDURE — 97161 PT EVAL LOW COMPLEX 20 MIN: CPT

## 2024-05-13 NOTE — PLAN OF CARE
[facility] [address] [place] [address] office: [phone] fax: [phone]     Physical Therapy Initial Evaluation Certification      Dear Perfecto Cao MD,    We had the pleasure of evaluating the following patient for physical therapy services at [facility].  A summary of our findings can be found in the initial assessment below.  This includes our plan of care.  If you have any questions or concerns regarding these findings, please do not hesitate to contact me at the office phone number listed above.  Thank you for the referral.     Physician Signature:_______________________________Date:__________________  By signing above (or electronic signature), therapist’s plan is approved by physician       Physical Therapy: TREATMENT/PROGRESS NOTE   Patient: Naseem Gupta (78 y.o. 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   Referring Physician: Perfecto Cao MD  PCP: Jamie Silva MD     Plan of care signed (Y/N):     Date of Patient follow up with Physician:      Progress Report/POC: EVAL today  POC update due: (10 visits /OR AUTH LIMITS, whichever is less)  2024                                             Precautions/ Contra-indications:           Latex allergy:  NO  Pacemaker:    NO  Contraindications for Manipulation: None and recent surgical history (relative)  Date of Surgery: 2024  Other:    Red

## 2024-05-15 ENCOUNTER — HOSPITAL ENCOUNTER (OUTPATIENT)
Dept: PHYSICAL THERAPY | Age: 79
Setting detail: THERAPIES SERIES
Discharge: HOME | End: 2024-05-15
Payer: MEDICARE

## 2024-05-15 PROCEDURE — 97110 THERAPEUTIC EXERCISES: CPT

## 2024-05-15 PROCEDURE — 97140 MANUAL THERAPY 1/> REGIONS: CPT

## 2024-05-15 NOTE — FLOWSHEET NOTE
allowing for proper ROM for normal function with self care, mobility, lifting and ambulation    GOALS     Patient stated goal: return to functional normal  [] Progressing: [] Met: [] Not Met: [] Adjusted    Therapist goals for Patient:   Short Term Goals: To be achieved in: 2 weeks  1. Independent in HEP and progression per patient tolerance, in order to prevent re-injury.   [] Progressing: [] Met: [] Not Met: [] Adjusted  2. Patient will have a decrease in pain to <0/10 to facilitate improvement in movement, function, and ADLs as indicated by Functional Deficits.  [] Progressing: [] Met: [] Not Met: [] Adjusted    Long Term Goals: To be achieved in: 8 weeks  1. Disability index score of 40% or less for the LEFS to assist with reaching prior level of function with activities such as walking and managing stairs.  [] Progressing: [] Met: [] Not Met: [] Adjusted  2. Patient will demonstrate increased AROM of 0 to 125 without pain to allow for proper joint functioning to enable patient to manage stairs and sit for prolonged periods of time.   [] Progressing: [] Met: [] Not Met: [] Adjusted  3. Patient will demonstrate increased Strength of R knee extensors to at least 5/5 throughout without pain to allow for proper functional mobility to enable patient to return to hiking and gardening.   [] Progressing: [] Met: [] Not Met: [] Adjusted  4. Patient will return to walking on uneven farm ground without increased symptoms or restriction.   [] Progressing: [] Met: [] Not Met: [] Adjusted  5. 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 (patient specific functional goal)    [] Progressing: [] Met: [] Not Met: [] Adjusted     Overall Progression Towards Functional goals/ Treatment Progress Update:  [] Patient is progressing as expected towards functional goals listed.    [] Progression is slowed

## 2024-05-21 ENCOUNTER — HOSPITAL ENCOUNTER (OUTPATIENT)
Dept: PHYSICAL THERAPY | Age: 79
Setting detail: THERAPIES SERIES
Discharge: HOME | End: 2024-05-21
Payer: MEDICARE

## 2024-05-21 PROCEDURE — 97140 MANUAL THERAPY 1/> REGIONS: CPT

## 2024-05-21 PROCEDURE — 97110 THERAPEUTIC EXERCISES: CPT

## 2024-05-21 NOTE — FLOWSHEET NOTE
[facility] - Outpatient Rehabilitation and Therapy [address] [place] [address] office: [phone] fax: [phone]         Physical Therapy: TREATMENT/PROGRESS NOTE   Patient: Naseem Gupta (78 y.o. 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   Referring Physician: Perfecto Cao MD  PCP: Jamie Silva MD     Plan of care signed (Y/N):     Date of Patient follow up with Physician:      Progress Report/POC: EVAL today  POC update due: (10 visits /OR AUTH LIMITS, whichever is less)  2024                                             Precautions/ Contra-indications:           Latex allergy:  NO  Pacemaker:    NO  Contraindications for Manipulation: None and recent surgical history (relative)  Date of Surgery: 2024  Other:    Red Flags:  None    C-SSRS Triggered by Intake questionnaire:   Patient answered 'NO' to both behavioral questions on intake.  No further screening warranted    Preferred Language for Healthcare:   [x] English       [] other:    SUBJECTIVE EXAMINATION     Patient stated complaint: Patient reports that knee is feeling pretty good with short distance walking and driving.  States that he hasn't done any long distance walking or any walking on uneven surfaces. Overall, feeling ok where he is at right now. Was sore after last session which lasted a day.         Test used Initial score  2024   Pain Summary VAS 3-6/10 2-3/10   Functional

## 2024-05-24 ENCOUNTER — HOSPITAL ENCOUNTER (OUTPATIENT)
Dept: PHYSICAL THERAPY | Age: 79
Setting detail: THERAPIES SERIES
Discharge: HOME | End: 2024-05-24
Payer: MEDICARE

## 2024-05-24 PROCEDURE — 97140 MANUAL THERAPY 1/> REGIONS: CPT

## 2024-05-24 PROCEDURE — 97110 THERAPEUTIC EXERCISES: CPT

## 2024-05-24 NOTE — FLOWSHEET NOTE
minutes (Mobilization/manipulation, manual lymphatic drainage, manual traction) for the purpose of modulating pain, promoting relaxation,  increasing ROM, reducing/eliminating soft tissue swelling/inflammation/restriction, improving soft tissue extensibility and allowing for proper ROM for normal function with self care, mobility, lifting and ambulation    GOALS     Patient stated goal: return to functional normal  [] Progressing: [] Met: [] Not Met: [] Adjusted    Therapist goals for Patient:   Short Term Goals: To be achieved in: 2 weeks  1. Independent in HEP and progression per patient tolerance, in order to prevent re-injury.   [] Progressing: [] Met: [] Not Met: [] Adjusted  2. Patient will have a decrease in pain to <0/10 to facilitate improvement in movement, function, and ADLs as indicated by Functional Deficits.  [] Progressing: [] Met: [] Not Met: [] Adjusted    Long Term Goals: To be achieved in: 8 weeks  1. Disability index score of 40% or less for the LEFS to assist with reaching prior level of function with activities such as walking and managing stairs.  [] Progressing: [] Met: [] Not Met: [] Adjusted  2. Patient will demonstrate increased AROM of 0 to 125 without pain to allow for proper joint functioning to enable patient to manage stairs and sit for prolonged periods of time.   [] Progressing: [] Met: [] Not Met: [] Adjusted  3. Patient will demonstrate increased Strength of R knee extensors to at least 5/5 throughout without pain to allow for proper functional mobility to enable patient to return to hiking and gardening.   [] Progressing: [] Met: [] Not Met: [] Adjusted  4. Patient will return to walking on uneven farm ground without increased symptoms or restriction.   [] Progressing: [] Met: [] Not Met: [] Adjusted  5. 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

## 2024-05-28 ENCOUNTER — HOSPITAL ENCOUNTER (OUTPATIENT)
Dept: PHYSICAL THERAPY | Age: 79
Setting detail: THERAPIES SERIES
Discharge: HOME | End: 2024-05-28
Payer: MEDICARE

## 2024-05-28 PROCEDURE — 97110 THERAPEUTIC EXERCISES: CPT

## 2024-05-28 PROCEDURE — 97140 MANUAL THERAPY 1/> REGIONS: CPT

## 2024-05-28 NOTE — FLOWSHEET NOTE
[facility] - Outpatient Rehabilitation and Therapy [address] [place] [address] office: [phone] fax: [phone]         Physical Therapy: TREATMENT/PROGRESS NOTE   Patient: Naseem Gupta (78 y.o. male)   Examination Date: 2024   :  1945 MRN: [id number]   Visit #: 5   Insurance Allowable Auth Needed   Aetna Medicare []Yes    [x]No    Insurance: Payor: AETNA MEDICARE / Plan: AETNA MEDICARE-ADVANTAGE PPO / Product Type: Medicare /   Insurance ID: [id number] - (Medicare Managed)  Secondary Insurance (if applicable):    Treatment Diagnosis: Z96.651 presence of R TKR s/p revision, R knee pain  No diagnosis found.   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   Referring Physician: Perfecto Cao MD  PCP: Jamie Silva MD     Plan of care signed (Y/N):     Date of Patient follow up with Physician:      Progress Report/POC: EVAL today  POC update due: (10 visits /OR AUTH LIMITS, whichever is less)  2024                                             Precautions/ Contra-indications:           Latex allergy:  NO  Pacemaker:    NO  Contraindications for Manipulation: None and recent surgical history (relative)  Date of Surgery: 2024  Other:    Red Flags:  None    C-SSRS Triggered by Intake questionnaire:   Patient answered 'NO' to both behavioral questions on intake.  No further screening warranted    Preferred Language for Healthcare:   [x] English       [] other:    SUBJECTIVE EXAMINATION     Patient stated complaint: Patient reports that knee is feeling pretty good. Reports that [facility] and back, also was able to do some work out in the yard and washing of windows. Still isn't doing the AM animal chores. Knee is feeling pretty good in general. Had imaging for L knee today to see if L knee prosthesis is loose like the R knee.           Test used

## 2024-05-30 ENCOUNTER — HOSPITAL ENCOUNTER (OUTPATIENT)
Dept: PHYSICAL THERAPY | Age: 79
Setting detail: THERAPIES SERIES
Discharge: HOME | End: 2024-05-30
Payer: MEDICARE

## 2024-05-30 PROCEDURE — 97110 THERAPEUTIC EXERCISES: CPT

## 2024-05-30 PROCEDURE — 97140 MANUAL THERAPY 1/> REGIONS: CPT

## 2024-05-30 NOTE — FLOWSHEET NOTE
NA    Prognosis for POC: [x] Good [] Fair  [] Poor    Patient requires continued skilled intervention: [x] Yes  [] No      CHARGE CAPTURE     PT CHARGE GRID   CPT Code (TIMED) minutes # CPT Code (UNTIMED) #     Therex (88025)  25 2  EVAL:LOW (51384 - Typically 20 minutes face-to-face)     Neuromusc. Re-ed (52469)    Re-Eval (02098)     Manual (15589) 15 1  Estim Unattended (64009)     Ther. Act (17218)    Mech. Traction (19533)     Gait (14460)    Dry Needle 1-2 muscle (09913)     Aquatic Therex (16103)    Dry Needle 3+ muscle (20561)     Iontophoresis (22951)    VASO (52189)     Ultrasound (94776)    Group Therapy (03536)     Estim Attended (19320)    Canalith Repositioning (03967)     Other:    Other:    Total Timed Code Tx Minutes 40 3       Total Treatment Minutes 40        Charge Justification:  (88772) THERAPEUTIC EXERCISE - Provided verbal/tactile cueing for activities related to strengthening, flexibility, endurance, ROM performed to prevent loss of range of motion, maintain or improve muscular strength or increase flexibility, following either an injury or surgery.   (71530) HOME EXERCISE PROGRAM - Reviewed/Progressed HEP activities related to strengthening, flexibility, endurance, ROM performed to prevent loss of range of motion, maintain or improve muscular strength or increase flexibility, following either an injury or surgery.  (46569) NEUROMUSCULAR RE-EDUCATION - Therapeutic procedure, 1 or more areas, each 15 minutes; neuromuscular reeducation of movement, balance, coordination, kinesthetic sense, posture, and/or proprioception for sitting and/or standing activities  (75935) THERAPEUTIC ACTIVITY - use of dynamic activities to improve functional performance. (Ex include squatting, ascending/descending stairs, walking, bending, lifting, catching, throwing, pushing, pulling, jumping.)  Direct, one on one contact, billed in 15-minute increments.  (32820) MANUAL THERAPY -  Manual therapy techniques, 1 or

## 2024-06-03 ENCOUNTER — HOSPITAL ENCOUNTER (OUTPATIENT)
Dept: PHYSICAL THERAPY | Age: 79
Setting detail: THERAPIES SERIES
Discharge: HOME | End: 2024-06-03
Payer: MEDICARE

## 2024-06-03 PROCEDURE — 97110 THERAPEUTIC EXERCISES: CPT

## 2024-06-03 PROCEDURE — 97140 MANUAL THERAPY 1/> REGIONS: CPT

## 2024-06-03 NOTE — FLOWSHEET NOTE
[facility] - Outpatient Rehabilitation and Therapy [address] [place] [address] office: [phone] fax: [phone]         Physical Therapy: TREATMENT/PROGRESS NOTE   Patient: Naseem Gupta (78 y.o. 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   Referring Physician: Perfecto Cao MD  PCP: Jamie Silva MD     Plan of care signed (Y/N):     Date of Patient follow up with Physician:      Progress Report/POC: EVAL today  POC update due: (10 visits /OR AUTH LIMITS, whichever is less)  7/3/2024                                             Precautions/ Contra-indications:           Latex allergy:  NO  Pacemaker:    NO  Contraindications for Manipulation: None and recent surgical history (relative)  Date of Surgery: 2024  Other:    Red Flags:  None    C-SSRS Triggered by Intake questionnaire:   Patient answered 'NO' to both behavioral questions on intake.  No further screening warranted    Preferred Language for Healthcare:   [x] English       [] other:    SUBJECTIVE EXAMINATION     Patient stated complaint: Patient reports that knee felt ok after last session. Still having some tenderness along shin- but no pain at patellar tendon after last session. Can tell he still has a bit of a limp when he is walking.           Test used Initial score  2024   Pain Summary VAS 3-6/10 2-3/10   Functional questionnaire LEFS 77.5%    Other:                OBJECTIVE EXAMINATION

## 2024-06-13 ENCOUNTER — HOSPITAL ENCOUNTER (OUTPATIENT)
Dept: PHYSICAL THERAPY | Age: 79
Setting detail: THERAPIES SERIES
Discharge: HOME | End: 2024-06-13
Payer: MEDICARE

## 2024-06-13 PROCEDURE — 97110 THERAPEUTIC EXERCISES: CPT

## 2024-06-13 PROCEDURE — 97140 MANUAL THERAPY 1/> REGIONS: CPT

## 2024-06-13 NOTE — FLOWSHEET NOTE
[facility] - Outpatient Rehabilitation and Therapy [address] [place] [address] office: [phone] fax: [phone]         Physical Therapy: TREATMENT/PROGRESS NOTE   Patient: Naseem Gupta (78 y.o. 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   Referring Physician: Perfecto Cao MD  PCP: Jamie Silva MD     Plan of care signed (Y/N):     Date of Patient follow up with Physician:      Progress Report/POC: EVAL today  POC update due: (10 visits /OR AUTH LIMITS, whichever is less)  2024                                             Precautions/ Contra-indications:           Latex allergy:  NO  Pacemaker:    NO  Contraindications for Manipulation: None and recent surgical history (relative)  Date of Surgery: 2024  Other:    Red Flags:  None    C-SSRS Triggered by Intake questionnaire:   Patient answered 'NO' to both behavioral questions on intake.  No further screening warranted    Preferred Language for Healthcare:   [x] English       [] other:    SUBJECTIVE EXAMINATION     Patient stated complaint: Patient reports that knee felt ok after last session. Still having some tenderness along shin. Did ok with walking while he was OOT.            Test used Initial score  2024   Pain Summary VAS 3-6/10 2-3/10   Functional questionnaire LEFS 77.5%    Other:                OBJECTIVE EXAMINATION     24  ROM/Strength: (Blank cells denote NT)      Mvmt (norm) AROM L

## 2024-06-21 ENCOUNTER — HOSPITAL ENCOUNTER (OUTPATIENT)
Dept: PHYSICAL THERAPY | Age: 79
Setting detail: THERAPIES SERIES
Discharge: HOME | End: 2024-06-21
Payer: MEDICARE

## 2024-06-21 PROCEDURE — 97140 MANUAL THERAPY 1/> REGIONS: CPT

## 2024-06-21 PROCEDURE — 97110 THERAPEUTIC EXERCISES: CPT

## 2024-06-21 NOTE — FLOWSHEET NOTE
the need for therapy.  (Significantly impacts the rate of recovery and is associated with a primary condition.)     Return to Play: NA    Prognosis for POC: [x] Good [] Fair  [] Poor    Patient requires continued skilled intervention: [x] Yes  [] No      CHARGE CAPTURE     PT CHARGE GRID   CPT Code (TIMED) minutes # CPT Code (UNTIMED) #     Therex (92603)  30 2  EVAL:LOW (65989 - Typically 20 minutes face-to-face)     Neuromusc. Re-ed (65930)    Re-Eval (90972)     Manual (13563) 20 1  Estim Unattended (13076)     Ther. Act (38039)    Mech. Traction (14407)     Gait (10674)    Dry Needle 1-2 muscle (99367)     Aquatic Therex (77324)    Dry Needle 3+ muscle (20561)     Iontophoresis (52119)    VASO (37018)     Ultrasound (97260)    Group Therapy (50810)     Estim Attended (85906)    Canalith Repositioning (45750)     Other:    Other:    Total Timed Code Tx Minutes 50 3       Total Treatment Minutes 50        Charge Justification:  (72593) THERAPEUTIC EXERCISE - Provided verbal/tactile cueing for activities related to strengthening, flexibility, endurance, ROM performed to prevent loss of range of motion, maintain or improve muscular strength or increase flexibility, following either an injury or surgery.   (16473) HOME EXERCISE PROGRAM - Reviewed/Progressed HEP activities related to strengthening, flexibility, endurance, ROM performed to prevent loss of range of motion, maintain or improve muscular strength or increase flexibility, following either an injury or surgery.  (43378) NEUROMUSCULAR RE-EDUCATION - Therapeutic procedure, 1 or more areas, each 15 minutes; neuromuscular reeducation of movement, balance, coordination, kinesthetic sense, posture, and/or proprioception for sitting and/or standing activities  (03339) THERAPEUTIC ACTIVITY - use of dynamic activities to improve functional performance. (Ex include squatting, ascending/descending stairs, walking, bending, lifting, catching, throwing, pushing, pulling,

## 2024-06-26 ENCOUNTER — HOSPITAL ENCOUNTER (OUTPATIENT)
Dept: PHYSICAL THERAPY | Age: 79
Setting detail: THERAPIES SERIES
Discharge: HOME | End: 2024-06-26
Payer: MEDICARE

## 2024-06-26 PROCEDURE — 97140 MANUAL THERAPY 1/> REGIONS: CPT

## 2024-06-26 PROCEDURE — 97110 THERAPEUTIC EXERCISES: CPT

## 2024-06-26 NOTE — PLAN OF CARE
1 or more areas, each 15 minutes; neuromuscular reeducation of movement, balance, coordination, kinesthetic sense, posture, and/or proprioception for sitting and/or standing activities  (00193) THERAPEUTIC ACTIVITY - use of dynamic activities to improve functional performance. (Ex include squatting, ascending/descending stairs, walking, bending, lifting, catching, throwing, pushing, pulling, jumping.)  Direct, one on one contact, billed in 15-minute increments.  (03579) MANUAL THERAPY -  Manual therapy techniques, 1 or more regions, each 15 minutes (Mobilization/manipulation, manual lymphatic drainage, manual traction) for the purpose of modulating pain, promoting relaxation,  increasing ROM, reducing/eliminating soft tissue swelling/inflammation/restriction, improving soft tissue extensibility and allowing for proper ROM for normal function with self care, mobility, lifting and ambulation    GOALS     Patient stated goal: return to functional normal  [x] Progressing: [] Met: [] Not Met: [] Adjusted    Therapist goals for Patient:   Short Term Goals: To be achieved in: 2 weeks  1. Independent in HEP and progression per patient tolerance, in order to prevent re-injury.   [] Progressing: [x] Met: [] Not Met: [] Adjusted  2. Patient will have a decrease in pain to <0/10 to facilitate improvement in movement, function, and ADLs as indicated by Functional Deficits.  [x] Progressing: [] Met: [] Not Met: [] Adjusted    Long Term Goals: To be achieved in: 8 weeks  1. Disability index score of 40% or less for the LEFS to assist with reaching prior level of function with activities such as walking and managing stairs.  [] Progressing: [x] Met: [] Not Met: [] Adjusted  2. Patient will demonstrate increased AROM of 0 to 125 without pain to allow for proper joint functioning to enable patient to manage stairs and sit for prolonged periods of time.   [] Progressing: [x] Met: [] Not Met: [] Adjusted  3. Patient will demonstrate

## 2024-07-03 ENCOUNTER — HOSPITAL ENCOUNTER (OUTPATIENT)
Dept: PHYSICAL THERAPY | Age: 79
Setting detail: THERAPIES SERIES
Discharge: HOME | End: 2024-07-03
Payer: MEDICARE

## 2024-07-03 PROCEDURE — 97110 THERAPEUTIC EXERCISES: CPT

## 2024-07-03 PROCEDURE — 97140 MANUAL THERAPY 1/> REGIONS: CPT

## 2024-07-03 NOTE — FLOWSHEET NOTE
activities to improve functional performance. (Ex include squatting, ascending/descending stairs, walking, bending, lifting, catching, throwing, pushing, pulling, jumping.)  Direct, one on one contact, billed in 15-minute increments.  (50778) MANUAL THERAPY -  Manual therapy techniques, 1 or more regions, each 15 minutes (Mobilization/manipulation, manual lymphatic drainage, manual traction) for the purpose of modulating pain, promoting relaxation,  increasing ROM, reducing/eliminating soft tissue swelling/inflammation/restriction, improving soft tissue extensibility and allowing for proper ROM for normal function with self care, mobility, lifting and ambulation    GOALS     Patient stated goal: return to functional normal  [x] Progressing: [] Met: [] Not Met: [] Adjusted    Therapist goals for Patient:   Short Term Goals: To be achieved in: 2 weeks  1. Independent in HEP and progression per patient tolerance, in order to prevent re-injury.   [] Progressing: [x] Met: [] Not Met: [] Adjusted  2. Patient will have a decrease in pain to <0/10 to facilitate improvement in movement, function, and ADLs as indicated by Functional Deficits.  [x] Progressing: [] Met: [] Not Met: [] Adjusted    Long Term Goals: To be achieved in: 8 weeks  1. Disability index score of 40% or less for the LEFS to assist with reaching prior level of function with activities such as walking and managing stairs.  [] Progressing: [x] Met: [] Not Met: [] Adjusted  2. Patient will demonstrate increased AROM of 0 to 125 without pain to allow for proper joint functioning to enable patient to manage stairs and sit for prolonged periods of time.   [] Progressing: [x] Met: [] Not Met: [] Adjusted  3. Patient will demonstrate increased Strength of R knee extensors to at least 5/5 throughout without pain to allow for proper functional mobility to enable patient to return to hiking and gardening.   [x] Progressing: [] Met: [] Not Met: [] Adjusted  4. Patient

## 2024-07-10 ENCOUNTER — HOSPITAL ENCOUNTER (OUTPATIENT)
Dept: PHYSICAL THERAPY | Age: 79
Setting detail: THERAPIES SERIES
Discharge: HOME | End: 2024-07-10
Payer: MEDICARE

## 2024-07-10 PROCEDURE — 97110 THERAPEUTIC EXERCISES: CPT

## 2024-07-10 PROCEDURE — 97140 MANUAL THERAPY 1/> REGIONS: CPT

## 2024-07-10 NOTE — FLOWSHEET NOTE
improve functional performance. (Ex include squatting, ascending/descending stairs, walking, bending, lifting, catching, throwing, pushing, pulling, jumping.)  Direct, one on one contact, billed in 15-minute increments.  (83210) MANUAL THERAPY -  Manual therapy techniques, 1 or more regions, each 15 minutes (Mobilization/manipulation, manual lymphatic drainage, manual traction) for the purpose of modulating pain, promoting relaxation,  increasing ROM, reducing/eliminating soft tissue swelling/inflammation/restriction, improving soft tissue extensibility and allowing for proper ROM for normal function with self care, mobility, lifting and ambulation    GOALS     Patient stated goal: return to functional normal  [x] Progressing: [] Met: [] Not Met: [] Adjusted    Therapist goals for Patient:   Short Term Goals: To be achieved in: 2 weeks  1. Independent in HEP and progression per patient tolerance, in order to prevent re-injury.   [] Progressing: [x] Met: [] Not Met: [] Adjusted  2. Patient will have a decrease in pain to <0/10 to facilitate improvement in movement, function, and ADLs as indicated by Functional Deficits.  [x] Progressing: [] Met: [] Not Met: [] Adjusted    Long Term Goals: To be achieved in: 8 weeks  1. Disability index score of 40% or less for the LEFS to assist with reaching prior level of function with activities such as walking and managing stairs.  [] Progressing: [x] Met: [] Not Met: [] Adjusted  2. Patient will demonstrate increased AROM of 0 to 125 without pain to allow for proper joint functioning to enable patient to manage stairs and sit for prolonged periods of time.   [] Progressing: [x] Met: [] Not Met: [] Adjusted  3. Patient will demonstrate increased Strength of R knee extensors to at least 5/5 throughout without pain to allow for proper functional mobility to enable patient to return to hiking and gardening.   [x] Progressing: [] Met: [] Not Met: [] Adjusted  4. Patient will return

## 2024-07-17 ENCOUNTER — HOSPITAL ENCOUNTER (OUTPATIENT)
Dept: PHYSICAL THERAPY | Age: 79
Setting detail: THERAPIES SERIES
Discharge: HOME | End: 2024-07-17
Payer: MEDICARE

## 2024-07-17 PROCEDURE — 97140 MANUAL THERAPY 1/> REGIONS: CPT

## 2024-07-17 PROCEDURE — 97110 THERAPEUTIC EXERCISES: CPT

## 2024-07-17 NOTE — FLOWSHEET NOTE
balance     Electronically Signed by Jeanie Levin, PT  Date: 07/17/2024     Note: Portions of this note have been templated and/or copied from initial evaluation, reassessments and prior notes for documentation efficiency.    Note: If patient does not return for scheduled/recommended follow up visits, this note will serve as a discharge from care along with the most recent update on progress.

## 2024-08-05 ENCOUNTER — HOSPITAL ENCOUNTER (OUTPATIENT)
Dept: PHYSICAL THERAPY | Age: 79
Setting detail: THERAPIES SERIES
Discharge: HOME | End: 2024-08-05
Payer: MEDICARE

## 2024-08-05 PROCEDURE — 97110 THERAPEUTIC EXERCISES: CPT

## 2024-08-05 PROCEDURE — 97140 MANUAL THERAPY 1/> REGIONS: CPT

## 2024-08-05 NOTE — FLOWSHEET NOTE
[facility] - Outpatient Rehabilitation and Therapy [address] [place] [address] office: [phone] fax: [phone]          Physical Therapy: TREATMENT/PROGRESS NOTE   Patient: Naseem Gupta (78 y.o. 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   Referring Physician: Perfecto Cao MD  PCP: Jamie Silva MD     Plan of care signed (Y/N):     Date of Patient follow up with Physician:      Progress Report/POC: YES, Date Range for this report: 2024 to 7/10/2024  POC update due: (10 visits /OR AUTH LIMITS, whichever is less)  2024                                             Precautions/ Contra-indications:           Latex allergy:  NO  Pacemaker:    NO  Contraindications for Manipulation: None and recent surgical history (relative)  Date of Surgery: 2024  Other:    Red Flags:  None    C-SSRS Triggered by Intake questionnaire:   Patient answered 'NO' to both behavioral questions on intake.  No further screening warranted    Preferred Language for Healthcare:   [x] English       [] other:    SUBJECTIVE EXAMINATION     Patient stated complaint: Patient reports that he went to [place] and was able to do some hiking with his cane and get in/out of canoe. Notes that he is still having pain in his shin. Did see doc and had imaging done- no real conclusion as to why he is having discomfort there. Feels like it is a little better and is having some decrease in swelling.             Test

## 2024-08-13 ENCOUNTER — HOSPITAL ENCOUNTER (OUTPATIENT)
Dept: PHYSICAL THERAPY | Age: 79
Setting detail: THERAPIES SERIES
Discharge: HOME | End: 2024-08-13
Payer: MEDICARE

## 2024-08-13 PROCEDURE — 97140 MANUAL THERAPY 1/> REGIONS: CPT

## 2024-08-13 PROCEDURE — 97110 THERAPEUTIC EXERCISES: CPT

## 2024-08-13 NOTE — FLOWSHEET NOTE
activities  (20921) THERAPEUTIC ACTIVITY - use of dynamic activities to improve functional performance. (Ex include squatting, ascending/descending stairs, walking, bending, lifting, catching, throwing, pushing, pulling, jumping.)  Direct, one on one contact, billed in 15-minute increments.  (72020) MANUAL THERAPY -  Manual therapy techniques, 1 or more regions, each 15 minutes (Mobilization/manipulation, manual lymphatic drainage, manual traction) for the purpose of modulating pain, promoting relaxation,  increasing ROM, reducing/eliminating soft tissue swelling/inflammation/restriction, improving soft tissue extensibility and allowing for proper ROM for normal function with self care, mobility, lifting and ambulation    GOALS     Patient stated goal: return to functional normal  [x] Progressing: [] Met: [] Not Met: [] Adjusted    Therapist goals for Patient:   Short Term Goals: To be achieved in: 2 weeks  1. Independent in HEP and progression per patient tolerance, in order to prevent re-injury.   [] Progressing: [x] Met: [] Not Met: [] Adjusted  2. Patient will have a decrease in pain to <0/10 to facilitate improvement in movement, function, and ADLs as indicated by Functional Deficits.  [x] Progressing: [] Met: [] Not Met: [] Adjusted    Long Term Goals: To be achieved in: 8 weeks  1. Disability index score of 40% or less for the LEFS to assist with reaching prior level of function with activities such as walking and managing stairs.  [] Progressing: [x] Met: [] Not Met: [] Adjusted  2. Patient will demonstrate increased AROM of 0 to 125 without pain to allow for proper joint functioning to enable patient to manage stairs and sit for prolonged periods of time.   [] Progressing: [x] Met: [] Not Met: [] Adjusted  3. Patient will demonstrate increased Strength of R knee extensors to at least 5/5 throughout without pain to allow for proper functional mobility to enable patient to return to hiking and gardening.

## 2024-08-21 ENCOUNTER — HOSPITAL ENCOUNTER (OUTPATIENT)
Dept: PHYSICAL THERAPY | Age: 79
Setting detail: THERAPIES SERIES
Discharge: HOME | End: 2024-08-21
Payer: MEDICARE

## 2024-08-21 PROCEDURE — 97140 MANUAL THERAPY 1/> REGIONS: CPT

## 2024-08-21 PROCEDURE — 97110 THERAPEUTIC EXERCISES: CPT

## 2024-08-21 NOTE — FLOWSHEET NOTE
[facility] - Outpatient Rehabilitation and Therapy [address] [place] [address] office: [phone] fax: [phone]          Physical Therapy: TREATMENT/PROGRESS NOTE   Patient: Naseem Gupta (78 y.o. 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   Referring Physician: Perfecto Cao MD  PCP: Jamie Silva MD     Plan of care signed (Y/N):     Date of Patient follow up with Physician:      Progress Report/POC: YES, Date Range for this report: 2024 to 7/10/2024  POC update due: (10 visits /OR AUTH LIMITS, whichever is less)  2024                                             Precautions/ Contra-indications:           Latex allergy:  NO  Pacemaker:    NO  Contraindications for Manipulation: None and recent surgical history (relative)  Date of Surgery: 2024  Other:    Red Flags:  None    C-SSRS Triggered by Intake questionnaire:   Patient answered 'NO' to both behavioral questions on intake.  No further screening warranted    Preferred Language for Healthcare:   [x] English       [] other:    SUBJECTIVE EXAMINATION     Patient stated complaint: Patient reports that he continues to have discomfort into lower leg when he is walking for extended periods. Did put on orthotics and is close to having them in his shoes for entire day. States that he doesn't feel much improvement with the orthotics at this time. Brought his prior images and most recent images to session today to

## 2024-08-28 ENCOUNTER — HOSPITAL ENCOUNTER (OUTPATIENT)
Dept: PHYSICAL THERAPY | Age: 79
Setting detail: THERAPIES SERIES
Discharge: HOME | End: 2024-08-28
Payer: MEDICARE

## 2024-08-28 PROCEDURE — 97110 THERAPEUTIC EXERCISES: CPT

## 2024-08-28 PROCEDURE — 97140 MANUAL THERAPY 1/> REGIONS: CPT

## 2024-08-28 NOTE — FLOWSHEET NOTE
[facility] - Outpatient Rehabilitation and Therapy [address] [place] [address] office: [phone] fax: [phone]          Physical Therapy: TREATMENT/PROGRESS NOTE   Patient: Naseem Gupta (78 y.o. male)   Examination Date: 2024   :  1945 MRN: [id number]   Visit #: 17   Insurance Allowable Auth Needed   Aetna Medicare []Yes    [x]No    Insurance: Payor: AETNA MEDICARE / Plan: AETNA MEDICARE-ADVANTAGE PPO / Product Type: Medicare /   Insurance ID: [id number] - (Medicare Managed)  Secondary Insurance (if applicable):    Treatment Diagnosis: Z96.651 presence of R TKR s/p revision, R knee pain  No diagnosis found.   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   Referring Physician: Perfecto Cao MD  PCP: Jamie Silva MD     Plan of care signed (Y/N):     Date of Patient follow up with Physician: 9/3/2024     Progress Report/POC: YES, Date Range for this report: 2024 to 7/10/2024  POC update due: (10 visits /OR AUTH LIMITS, whichever is less)  2024                                             Precautions/ Contra-indications:           Latex allergy:  NO  Pacemaker:    NO  Contraindications for Manipulation: None and recent surgical history (relative)  Date of Surgery: 2024  Other:    Red Flags:  None    C-SSRS Triggered by Intake questionnaire:   Patient answered 'NO' to both behavioral questions on intake.  No further screening warranted    Preferred Language for Healthcare:   [x] English       [] other:    SUBJECTIVE EXAMINATION     Patient stated complaint: Patient reports that he continues to have discomfort along shin and some in knee. Can't tell if orthotics are helping much.               Test used Initial score  2024   Pain Summary VAS 3-6/10 2-310   Functional questionnaire LEFS 77.5% 35%   Other:                OBJECTIVE EXAMINATION  (Mobilization/manipulation, manual lymphatic drainage, manual traction) for the purpose of modulating pain, promoting relaxation,  increasing ROM, reducing/eliminating soft tissue swelling/inflammation/restriction, improving soft tissue extensibility and allowing for proper ROM for normal function with self care, mobility, lifting and ambulation    GOALS     Patient stated goal: return to functional normal  [x] Progressing: [] Met: [] Not Met: [] Adjusted    Therapist goals for Patient:   Short Term Goals: To be achieved in: 2 weeks  1. Independent in HEP and progression per patient tolerance, in order to prevent re-injury.   [] Progressing: [x] Met: [] Not Met: [] Adjusted  2. Patient will have a decrease in pain to <0/10 to facilitate improvement in movement, function, and ADLs as indicated by Functional Deficits.  [x] Progressing: [] Met: [] Not Met: [] Adjusted    Long Term Goals: To be achieved in: 8 weeks  1. Disability index score of 40% or less for the LEFS to assist with reaching prior level of function with activities such as walking and managing stairs.  [] Progressing: [x] Met: [] Not Met: [] Adjusted  2. Patient will demonstrate increased AROM of 0 to 125 without pain to allow for proper joint functioning to enable patient to manage stairs and sit for prolonged periods of time.   [] Progressing: [x] Met: [] Not Met: [] Adjusted  3. Patient will demonstrate increased Strength of R knee extensors to at least 5/5 throughout without pain to allow for proper functional mobility to enable patient to return to hiking and gardening.   [x] Progressing: [] Met: [] Not Met: [] Adjusted  4. Patient will return to walking on uneven farm ground without increased symptoms or restriction.   [x] Progressing: [] Met: [] Not Met: [] Adjusted  5. 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

## 2024-09-04 ENCOUNTER — HOSPITAL ENCOUNTER (OUTPATIENT)
Dept: PHYSICAL THERAPY | Age: 79
Setting detail: THERAPIES SERIES
Discharge: HOME | End: 2024-09-04
Payer: MEDICARE

## 2024-09-04 PROCEDURE — 97140 MANUAL THERAPY 1/> REGIONS: CPT

## 2024-09-04 PROCEDURE — 97110 THERAPEUTIC EXERCISES: CPT

## 2024-09-04 NOTE — FLOWSHEET NOTE
[] Not Met: [] Adjusted  5. 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 (patient specific functional goal)    [x] Progressing: [] Met: [] Not Met: [] Adjusted     Overall Progression Towards Functional goals/ Treatment Progress Update:  [x] Patient is progressing as expected towards functional goals listed.    [] Progression is slowed due to complexities/Impairments listed.  [] Progression has been slowed due to co-morbidities.  [] Plan just implemented, too soon (<30days) to assess goals progression   [] Goals require adjustment due to lack of progress  [] Patient is not progressing as expected and requires additional follow up with physician  [] Other:     TREATMENT PLAN     Frequency/Duration: 2x/week for 8-10 weeks for the following treatment interventions:    Interventions:  Therapeutic Exercise (36514) including: strength training, ROM, and functional mobility  Therapeutic Activities (87738) including: functional mobility training and education.  Neuromuscular Re-education (56393) activation and proprioception, including postural re-education.    Gait Training (20254) for normalization of ambulation patterns and AD training.   Manual Therapy (19603) as indicated to include: Passive Range of Motion, Gr I-IV mobilizations, and Soft Tissue Mobilization  Modalities as needed that may include: Cryotherapy    Plan: Cont POC- Continue emphasis/focus on exercise progression, improving proper muscle recruitment and activation/motor control patterns, improving soft tissue extensibility, and static and dynamic balance. Next visit plan to progress weights, progress reps, add new exercises, and progress balance     Electronically Signed by Jeanie Levin PT  Date: 09/04/2024     Note: Portions of this note have been templated and/or copied from initial evaluation, reassessments and prior

## 2024-09-11 ENCOUNTER — HOSPITAL ENCOUNTER (OUTPATIENT)
Dept: PHYSICAL THERAPY | Age: 79
Setting detail: THERAPIES SERIES
Discharge: HOME | End: 2024-09-11
Payer: MEDICARE

## 2024-09-11 PROCEDURE — 97110 THERAPEUTIC EXERCISES: CPT

## 2024-09-11 PROCEDURE — 97140 MANUAL THERAPY 1/> REGIONS: CPT

## 2024-09-23 ENCOUNTER — HOSPITAL ENCOUNTER (OUTPATIENT)
Dept: PHYSICAL THERAPY | Age: 79
Setting detail: THERAPIES SERIES
Discharge: HOME | End: 2024-09-23
Payer: MEDICARE

## 2024-09-23 PROCEDURE — 97110 THERAPEUTIC EXERCISES: CPT

## 2024-09-23 PROCEDURE — 97140 MANUAL THERAPY 1/> REGIONS: CPT

## 2024-10-02 ENCOUNTER — HOSPITAL ENCOUNTER (OUTPATIENT)
Dept: PHYSICAL THERAPY | Age: 79
Setting detail: THERAPIES SERIES
Discharge: HOME | End: 2024-10-02
Payer: MEDICARE

## 2024-10-02 PROCEDURE — 97110 THERAPEUTIC EXERCISES: CPT

## 2024-10-02 PROCEDURE — 97140 MANUAL THERAPY 1/> REGIONS: CPT

## 2024-10-02 NOTE — PLAN OF CARE
[facility] - Outpatient Rehabilitation and Therapy [address] [place] [address] office: [phone] fax: [phone]           Physical Therapy Re-Certification Plan of Care/MD UPDATE      Dear Dr Cao  ,    We had the pleasure of treating the following patient for physical therapy services at [facility].  A summary of our findings can be found in the updated assessment below.  This includes our plan of care.  If you have any questions or concerns regarding these findings, please do not hesitate to contact me at the office phone number checked above.  Thank you for the referral.     Physician Signature:________________________________Date:__________________  By signing above (or electronic signature), therapist’s plan is approved by physician      Current Functional Status:   Naseem Gupta 1945 continues to present with functional deficits in strength symmetry, endurance of strength, and eccentric control  limiting ability with walking on even ground, walking on uneven ground, and managing community ambulation .  During therapy this date, patient required progression of exercises and program for exercise progression and improving proper muscle recruitment and activation/motor control patterns. Patient will continue to benefit from ongoing evaluation and advanced clinical decision from a Physical Therapist to improve muscle strength, neuromuscular control, and endurance to safely return to PLOF without symptoms or restrictions.    Overall Response to Treatment:   [x]Patient is responding well to treatment and improvement is noted with regards to goals   []Patient should continue to improve in reasonable time if they continue HEP   []Patient has plateaued and is no longer responding to skilled PT intervention    []Patient is getting worse and would benefit from return to referring MD   []Patient unable to adhere to initial POC   []Other:       Total Visits: 21

## 2024-10-15 ENCOUNTER — HOSPITAL ENCOUNTER (OUTPATIENT)
Dept: PHYSICAL THERAPY | Age: 79
Setting detail: THERAPIES SERIES
Discharge: HOME | End: 2024-10-15
Payer: MEDICARE

## 2024-10-15 PROCEDURE — 97110 THERAPEUTIC EXERCISES: CPT

## 2024-10-15 PROCEDURE — 97140 MANUAL THERAPY 1/> REGIONS: CPT

## 2024-10-15 NOTE — FLOWSHEET NOTE
[facility] - Outpatient Rehabilitation and Therapy [address] [place] [address] office: [phone] fax: [phone]            Physical Therapy: TREATMENT/PROGRESS NOTE   Patient: Naseem Gupta (78 y.o. male)   Examination Date: 10/15/2024   :  1945 MRN: [id number]   Visit #: 22   Insurance Allowable Auth Needed   Aetna Medicare []Yes    [x]No    Insurance: Payor: AETNA MEDICARE / Plan: AETNA MEDICARE-ADVANTAGE PPO / Product Type: Medicare /   Insurance ID: [id number] - (Medicare Managed)  Secondary Insurance (if applicable):    Treatment Diagnosis: Z96.651 presence of R TKR s/p revision, R knee pain  No diagnosis found.   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   Referring Physician: Perfecto Cao MD  PCP: Jamie Silva MD     Plan of care signed (Y/N):     Date of Patient follow up with Physician: 9/3/2024     Progress Report/POC: YES, Date Range for this report: 7/10/2024 to 10/2/2024  POC update due: (10 visits /OR AUTH LIMITS, whichever is less)  2024                                             Precautions/ Contra-indications:           Latex allergy:  NO  Pacemaker:    NO  Contraindications for Manipulation: None and recent surgical history (relative)  Date of Surgery: 2024  Other:    Red Flags:  None    C-SSRS Triggered by Intake questionnaire:   Patient answered 'NO' to both behavioral questions on intake.  No further screening warranted    Preferred Language for Healthcare:   [x] English       [] other:    SUBJECTIVE EXAMINATION     Patient stated complaint: Patient reports that he has been working on ankle exercises. Both lower legs have been more sore/fatigued. Would like some additional information about working with bike to improve cardio while he is at gym.                MRI R tibia/fibula 10/1/24  No osseous lesion. Moderate diffuse muscular atrophy of soleus and

## 2024-10-21 ENCOUNTER — HOSPITAL ENCOUNTER (OUTPATIENT)
Dept: PHYSICAL THERAPY | Age: 79
Setting detail: THERAPIES SERIES
Discharge: HOME | End: 2024-10-21
Payer: MEDICARE

## 2024-10-21 PROCEDURE — 97110 THERAPEUTIC EXERCISES: CPT

## 2024-10-21 PROCEDURE — 97140 MANUAL THERAPY 1/> REGIONS: CPT

## 2024-10-21 NOTE — FLOWSHEET NOTE
[facility] - Outpatient Rehabilitation and Therapy [address] [place] [address] office: [phone] fax: [phone]            Physical Therapy: TREATMENT/PROGRESS NOTE   Patient: Naseem Gupta (78 y.o. 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   Referring Physician: Perfecto Cao MD  PCP: Jamie Silva MD     Plan of care signed (Y/N):     Date of Patient follow up with Physician: 9/3/2024     Progress Report/POC: YES, Date Range for this report: 7/10/2024 to 10/2/2024  POC update due: (10 visits /OR AUTH LIMITS, whichever is less)  2024                                             Precautions/ Contra-indications:           Latex allergy:  NO  Pacemaker:    NO  Contraindications for Manipulation: None and recent surgical history (relative)  Date of Surgery: 2024  Other:    Red Flags:  None    C-SSRS Triggered by Intake questionnaire:   Patient answered 'NO' to both behavioral questions on intake.  No further screening warranted    Preferred Language for Healthcare:   [x] English       [] other:    SUBJECTIVE EXAMINATION     Patient stated complaint: Patient reports that his lower leg has been feeling a little better. Does report that when he gets up first thing in the morning, he has some nervy pain in and around the knee that causes knee to feel like it wants to buckle. Once he loosens up and meloxicam takes affect then he feels better. Did start at gym with working

## 2024-10-30 ENCOUNTER — HOSPITAL ENCOUNTER (OUTPATIENT)
Dept: PHYSICAL THERAPY | Age: 79
Setting detail: THERAPIES SERIES
Discharge: HOME | End: 2024-10-30
Payer: MEDICARE

## 2024-10-30 PROCEDURE — 97140 MANUAL THERAPY 1/> REGIONS: CPT

## 2024-10-30 PROCEDURE — 97110 THERAPEUTIC EXERCISES: CPT

## 2024-11-07 ENCOUNTER — HOSPITAL ENCOUNTER (OUTPATIENT)
Dept: PHYSICAL THERAPY | Age: 79
Setting detail: THERAPIES SERIES
Discharge: HOME | End: 2024-11-07
Payer: MEDICARE

## 2024-11-07 PROCEDURE — 97140 MANUAL THERAPY 1/> REGIONS: CPT

## 2024-11-07 PROCEDURE — 97110 THERAPEUTIC EXERCISES: CPT

## 2024-11-07 NOTE — FLOWSHEET NOTE
to assist with reaching prior level of function with activities such as walking and managing stairs.  [] Progressing: [x] Met: [] Not Met: [] Adjusted  2. Patient will demonstrate increased AROM of 0 to 125 without pain to allow for proper joint functioning to enable patient to manage stairs and sit for prolonged periods of time.   [] Progressing: [x] Met: [] Not Met: [] Adjusted  3. Patient will demonstrate increased Strength of R knee extensors to at least 5/5 throughout without pain to allow for proper functional mobility to enable patient to return to hiking and gardening.   [x] Progressing: [] Met: [] Not Met: [] Adjusted  4. Patient will return to walking on uneven farm ground without increased symptoms or restriction.   [x] Progressing: [] Met: [] Not Met: [] Adjusted  5. 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 (patient specific functional goal)    [x] Progressing: [] Met: [] Not Met: [] Adjusted     Overall Progression Towards Functional goals/ Treatment Progress Update:  [x] Patient is progressing as expected towards functional goals listed.    [] Progression is slowed due to complexities/Impairments listed.  [] Progression has been slowed due to co-morbidities.  [] Plan just implemented, too soon (<30days) to assess goals progression   [] Goals require adjustment due to lack of progress  [] Patient is not progressing as expected and requires additional follow up with physician  [] Other:     TREATMENT PLAN     Frequency/Duration: 2x/week for 8-10 weeks for the following treatment interventions:    Interventions:  Therapeutic Exercise (15928) including: strength training, ROM, and functional mobility  Therapeutic Activities (95576) including: functional mobility training and education.  Neuromuscular Re-education (74008) activation and proprioception, including postural

## 2024-11-13 ENCOUNTER — HOSPITAL ENCOUNTER (OUTPATIENT)
Dept: PHYSICAL THERAPY | Age: 79
Setting detail: THERAPIES SERIES
Discharge: HOME | End: 2024-11-13
Payer: MEDICARE

## 2024-11-13 PROCEDURE — 97140 MANUAL THERAPY 1/> REGIONS: CPT

## 2024-11-13 PROCEDURE — 97110 THERAPEUTIC EXERCISES: CPT

## 2024-11-13 NOTE — FLOWSHEET NOTE
[facility] - Outpatient Rehabilitation and Therapy [address] [place] [address] office: [phone] fax: [phone]            Physical Therapy: TREATMENT/PROGRESS NOTE   Patient: Naseem Gupta (79 y.o. male)   Examination Date: 2024   :  1945 MRN: [id number]   Visit #: 26   Insurance Allowable Auth Needed   Aetna Medicare []Yes    [x]No    Insurance: Payor: AETNA MEDICARE / Plan: AETNA MEDICARE-ADVANTAGE PPO / Product Type: Medicare /   Insurance ID: [id number] - (Medicare Managed)  Secondary Insurance (if applicable):    Treatment Diagnosis: Z96.651 presence of R TKR s/p revision, R knee pain  No diagnosis found.   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   Referring Physician: Perfecto Cao MD  PCP: Jamie Silva MD     Plan of care signed (Y/N):     Date of Patient follow up with Physician: 9/3/2024     Progress Report/POC: YES, Date Range for this report: 7/10/2024 to 10/2/2024  POC update due: (10 visits /OR AUTH LIMITS, whichever is less)  2024                                             Precautions/ Contra-indications:           Latex allergy:  NO  Pacemaker:    NO  Contraindications for Manipulation: None and recent surgical history (relative)  Date of Surgery: 2024  Other:    Red Flags:  None    C-SSRS Triggered by Intake questionnaire:   Patient answered 'NO' to both behavioral questions on intake.  No further screening warranted    Preferred Language for Healthcare:   [x] English       [] other:    SUBJECTIVE EXAMINATION     Patient stated complaint: Patient reports that his lower leg is feeling a little better. Notes that he feels like he has had less swelling in his lower leg in the past week. Also having less overall discomfort in his lower leg with walking. Has been working on more of the lower leg strengthening exercises.                   MRI R tibia/fibula

## 2024-11-18 ENCOUNTER — HOSPITAL ENCOUNTER (OUTPATIENT)
Dept: PHYSICAL THERAPY | Age: 79
Setting detail: THERAPIES SERIES
Discharge: HOME | End: 2024-11-18
Payer: MEDICARE

## 2024-11-18 PROCEDURE — 97110 THERAPEUTIC EXERCISES: CPT

## 2024-11-18 PROCEDURE — 97140 MANUAL THERAPY 1/> REGIONS: CPT

## 2024-11-18 NOTE — FLOWSHEET NOTE
[facility] - Outpatient Rehabilitation and Therapy [address] [place] [address] office: [phone] fax: [phone]            Physical Therapy: TREATMENT/PROGRESS NOTE   Patient: Naseem Gupta (79 y.o. male)   Examination Date: 2024   :  1945 MRN: [id number]   Visit #: 27   Insurance Allowable Auth Needed   Aetna Medicare []Yes    [x]No    Insurance: Payor: AETNA MEDICARE / Plan: AETNA MEDICARE-ADVANTAGE PPO / Product Type: Medicare /   Insurance ID: [id number] - (Medicare Managed)  Secondary Insurance (if applicable):    Treatment Diagnosis: Z96.651 presence of R TKR s/p revision, R knee pain  No diagnosis found.   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   Referring Physician: Perfecto Cao MD  PCP: Jamie Silva MD     Plan of care signed (Y/N):     Date of Patient follow up with Physician: 9/3/2024     Progress Report/POC: YES, Date Range for this report: 7/10/2024 to 10/2/2024  POC update due: (10 visits /OR AUTH LIMITS, whichever is less)  2024                                             Precautions/ Contra-indications:           Latex allergy:  NO  Pacemaker:    NO  Contraindications for Manipulation: None and recent surgical history (relative)  Date of Surgery: 2024  Other:    Red Flags:  None    C-SSRS Triggered by Intake questionnaire:   Patient answered 'NO' to both behavioral questions on intake.  No further screening warranted    Preferred Language for Healthcare:   [x] English       [] other:    SUBJECTIVE EXAMINATION     Patient stated complaint: Patient reports that he was sore after last session. 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 States that he continues to feel better with his walk, but does note he will limp towards later in the day and with increased

## 2024-11-26 ENCOUNTER — HOSPITAL ENCOUNTER (OUTPATIENT)
Dept: PHYSICAL THERAPY | Age: 79
Setting detail: THERAPIES SERIES
Discharge: HOME | End: 2024-11-26
Payer: MEDICARE

## 2024-11-26 PROCEDURE — 97140 MANUAL THERAPY 1/> REGIONS: CPT

## 2024-11-26 PROCEDURE — 97110 THERAPEUTIC EXERCISES: CPT

## 2024-11-26 NOTE — FLOWSHEET NOTE
Motion, Gr I-IV mobilizations, and Soft Tissue Mobilization  Modalities as needed that may include: Cryotherapy    Plan: Cont POC- Continue emphasis/focus on exercise progression, improving proper muscle recruitment and activation/motor control patterns, improving soft tissue extensibility, and static and dynamic balance. Next visit plan to progress weights, progress reps, add new exercises, and progress balance     Electronically Signed by Jeanie Levin, PT  Date: 11/26/2024     Note: Portions of this note have been templated and/or copied from initial evaluation, reassessments and prior notes for documentation efficiency.    Note: If patient does not return for scheduled/recommended follow up visits, this note will serve as a discharge from care along with the most recent update on progress.

## 2024-12-04 ENCOUNTER — HOSPITAL ENCOUNTER (OUTPATIENT)
Dept: PHYSICAL THERAPY | Age: 79
Setting detail: THERAPIES SERIES
Discharge: HOME | End: 2024-12-04
Payer: MEDICARE

## 2024-12-04 PROCEDURE — 97140 MANUAL THERAPY 1/> REGIONS: CPT

## 2024-12-04 PROCEDURE — 97110 THERAPEUTIC EXERCISES: CPT

## 2024-12-04 NOTE — FLOWSHEET NOTE
[facility] - Outpatient Rehabilitation and Therapy [address] [place] [address] office: [phone] fax: [phone]            Physical Therapy: TREATMENT/PROGRESS NOTE   Patient: Naseem Gputa (79 y.o. male)   Examination Date: 2024   :  1945 MRN: [id number]   Visit #: 29   Insurance Allowable Auth Needed   Aetna Medicare []Yes    [x]No    Insurance: Payor: AETNA MEDICARE / Plan: AETNA MEDICARE-ADVANTAGE PPO / Product Type: Medicare /   Insurance ID: [id number] - (Medicare Managed)  Secondary Insurance (if applicable):    Treatment Diagnosis: Z96.651 presence of R TKR s/p revision, R knee pain  No diagnosis found.   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   Referring Physician: Perfecto Cao MD  PCP: Jamie Silva MD     Plan of care signed (Y/N):     Date of Patient follow up with Physician: 9/3/2024     Progress Report/POC: YES, Date Range for this report: 7/10/2024 to 10/2/2024  POC update due: (10 visits /OR AUTH LIMITS, whichever is less)  1/3/2025                                             Precautions/ Contra-indications:           Latex allergy:  NO  Pacemaker:    NO  Contraindications for Manipulation: None and recent surgical history (relative)  Date of Surgery: 2024  Other:    Red Flags:  None    C-SSRS Triggered by Intake questionnaire:   Patient answered 'NO' to both behavioral questions on intake.  No further screening warranted    Preferred Language for Healthcare:   [x] English       [] other:    SUBJECTIVE EXAMINATION     Patient stated complaint: Patient reports that he wasn't too sore after last session. Having a little less discomfort along front of shin. Did a lot of walking on uneven ground and his ankles are having a bit more discomfort from this.                     MRI R tibia/fibula 10/1/24  No osseous lesion. Moderate diffuse muscular atrophy of soleus and

## 2024-12-11 ENCOUNTER — HOSPITAL ENCOUNTER (OUTPATIENT)
Dept: PHYSICAL THERAPY | Age: 79
Setting detail: THERAPIES SERIES
Discharge: HOME | End: 2024-12-11
Payer: MEDICARE

## 2024-12-11 PROCEDURE — 97140 MANUAL THERAPY 1/> REGIONS: CPT

## 2024-12-11 PROCEDURE — 97110 THERAPEUTIC EXERCISES: CPT

## 2024-12-11 NOTE — FLOWSHEET NOTE
postural re-education.    Gait Training (90794) for normalization of ambulation patterns and AD training.   Manual Therapy (36028) as indicated to include: Passive Range of Motion, Gr I-IV mobilizations, and Soft Tissue Mobilization  Modalities as needed that may include: Cryotherapy    Plan: Cont POC- Continue emphasis/focus on exercise progression, improving proper muscle recruitment and activation/motor control patterns, improving soft tissue extensibility, and static and dynamic balance. Next visit plan to progress weights, progress reps, add new exercises, and progress balance     Electronically Signed by Jeanie Levin PT  Date: 12/11/2024     Note: Portions of this note have been templated and/or copied from initial evaluation, reassessments and prior notes for documentation efficiency.    Note: If patient does not return for scheduled/recommended follow up visits, this note will serve as a discharge from care along with the most recent update on progress.

## 2024-12-19 ENCOUNTER — HOSPITAL ENCOUNTER (OUTPATIENT)
Dept: PHYSICAL THERAPY | Age: 79
Setting detail: THERAPIES SERIES
Discharge: HOME | End: 2024-12-19
Payer: MEDICARE

## 2024-12-19 PROCEDURE — 97110 THERAPEUTIC EXERCISES: CPT

## 2024-12-19 PROCEDURE — 97140 MANUAL THERAPY 1/> REGIONS: CPT

## 2024-12-19 NOTE — FLOWSHEET NOTE
[facility] - Outpatient Rehabilitation and Therapy [address] [place] [address] office: [phone] fax: [phone]            Physical Therapy: TREATMENT/PROGRESS NOTE   Patient: Naseem Gupta (79 y.o. 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   Referring Physician: Perfecto Cao MD  PCP: Jamie Silva MD     Plan of care signed (Y/N):     Date of Patient follow up with Physician: 9/3/2024     Progress Report/POC: YES, Date Range for this report: 7/10/2024 to 10/2/2024  POC update due: (10 visits /OR AUTH LIMITS, whichever is less)  2025                                             Precautions/ Contra-indications:           Latex allergy:  NO  Pacemaker:    NO  Contraindications for Manipulation: None and recent surgical history (relative)  Date of Surgery: 2024  Other:    Red Flags:  None    C-SSRS Triggered by Intake questionnaire:   Patient answered 'NO' to both behavioral questions on intake.  No further screening warranted    Preferred Language for Healthcare:   [x] English       [] other:    SUBJECTIVE EXAMINATION     Patient stated complaint: Patient reports that he wasn't too sore after last session. Feels like he is making good progress. Both ankles have some soreness - not sure if it is due to walking on uneven surface in barn or from diet. Was a little sore after last session, but didn't last.                  MRI R tibia/fibula 10/1/24  No osseous lesion.

## 2024-12-31 ENCOUNTER — APPOINTMENT (OUTPATIENT)
Dept: PHYSICAL THERAPY | Age: 79
End: 2024-12-31
Payer: MEDICARE

## 2025-01-14 ENCOUNTER — HOSPITAL ENCOUNTER (OUTPATIENT)
Dept: PHYSICAL THERAPY | Age: 80
Setting detail: THERAPIES SERIES
Discharge: HOME | End: 2025-01-14
Payer: MEDICARE

## 2025-01-14 PROCEDURE — 97140 MANUAL THERAPY 1/> REGIONS: CPT

## 2025-01-14 PROCEDURE — 97110 THERAPEUTIC EXERCISES: CPT

## 2025-01-14 NOTE — PLAN OF CARE
[facility] - Outpatient Rehabilitation and Therapy [address] [place] [address] office: [phone] fax: [phone]         Physical Therapy Re-Certification Plan of Care/MD UPDATE      Dear Dr Cao  ,    We had the pleasure of treating the following patient for physical therapy services at [facility].  A summary of our findings can be found in the updated assessment below.  This includes our plan of care.  If you have any questions or concerns regarding these findings, please do not hesitate to contact me at the office phone number checked above.  Thank you for the referral.     Physician Signature:________________________________Date:__________________  By signing above (or electronic signature), therapist’s plan is approved by physician      Current Functional Status:   Naseem Gupta 1945 continues to present with functional deficits in strength symmetry and endurance of strength  limiting ability with walking up/down stairs, heavy home activity, and yardwork .  During therapy this date, patient required progression of exercises and program and manual interventions for exercise progression and improving proper muscle recruitment and activation/motor control patterns. Patient will continue to benefit from ongoing evaluation and advanced clinical decision from a Physical Therapist to improve muscle strength, neuromuscular control, endurance, balance and proprioception, and high level IADLs to safely return to PLOF without symptoms or restrictions.    Overall Response to Treatment:   [x]Patient is responding well to treatment and improvement is noted with regards to goals   []Patient should continue to improve in reasonable time if they continue HEP   []Patient has plateaued and is no longer responding to skilled PT intervention    []Patient is getting worse and would benefit from return to referring MD   []Patient unable to adhere to initial POC   []Other:

## 2025-02-05 ENCOUNTER — HOSPITAL ENCOUNTER (OUTPATIENT)
Dept: PHYSICAL THERAPY | Age: 80
Setting detail: THERAPIES SERIES
Discharge: HOME | End: 2025-02-05
Payer: MEDICARE

## 2025-02-05 PROCEDURE — 97140 MANUAL THERAPY 1/> REGIONS: CPT

## 2025-02-05 PROCEDURE — 97110 THERAPEUTIC EXERCISES: CPT

## 2025-02-05 NOTE — FLOWSHEET NOTE
[facility] - Outpatient Rehabilitation and Therapy [address] [place] [address] office: [phone] fax: [phone]           Physical Therapy: TREATMENT/PROGRESS NOTE   Patient: Naseem Gupta (79 y.o. 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   Referring Physician: Perfecto Cao MD  PCP: Jamie Silva MD     Plan of care signed (Y/N):     Date of Patient follow up with Physician: 9/3/2024     Progress Report/POC: YES, Date Range for this report: 10/[address]89 to 2025  POC update due: (10 visits /OR AUTH LIMITS, whichever is less)  3/7/2025                                             Precautions/ Contra-indications:           Latex allergy:  NO  Pacemaker:    NO  Contraindications for Manipulation: None and recent surgical history (relative)  Date of Surgery: 2024  Other:    Red Flags:  None    C-SSRS Triggered by Intake questionnaire:   Patient answered 'NO' to both behavioral questions on intake.  No further screening warranted    Preferred Language for Healthcare:   [x] English       [] other:    SUBJECTIVE EXAMINATION     Patient stated complaint: Patient reports that he wasn't too sore after last session. Feels like he is making good progress. Notes that with all the recent snow and bad weather he has been having a bit more soreness along his L shin and ankle- feels like he is having a hard time pushing off with his big toe on the L. Was able to do more walking over the

## 2025-02-19 ENCOUNTER — HOSPITAL ENCOUNTER (OUTPATIENT)
Dept: PHYSICAL THERAPY | Age: 80
Setting detail: THERAPIES SERIES
Discharge: HOME | End: 2025-02-19
Payer: MEDICARE

## 2025-02-19 PROCEDURE — 97110 THERAPEUTIC EXERCISES: CPT

## 2025-02-19 PROCEDURE — 97140 MANUAL THERAPY 1/> REGIONS: CPT

## 2025-02-19 NOTE — FLOWSHEET NOTE
[facility] - Outpatient Rehabilitation and Therapy [address] [place] [address] office: [phone] fax: [phone]           Physical Therapy: TREATMENT/PROGRESS NOTE   Patient: Naseem Gupta (79 y.o. 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   Referring Physician: Perfecto Cao MD  PCP: Jamie Silva MD     Plan of care signed (Y/N):     Date of Patient follow up with Physician: 9/3/2024     Progress Report/POC: YES, Date Range for this report: 10/48101 to 2025  POC update due: (10 visits /OR AUTH LIMITS, whichever is less)  3/21/2025                                             Precautions/ Contra-indications:           Latex allergy:  NO  Pacemaker:    NO  Contraindications for Manipulation: None and recent surgical history (relative)  Date of Surgery: 2024  Other:    Red Flags:  None    C-SSRS Triggered by Intake questionnaire:   Patient answered 'NO' to both behavioral questions on intake.  No further screening warranted    Preferred Language for Healthcare:   [x] English       [] other:    SUBJECTIVE EXAMINATION     Patient stated complaint: Patient reports that he wasn't too sore after last session. Continues to feel like he is making good progress. Having less issue overall with walking. Less soreness along L ankle/shin as well. Continues to walk 1 mile daily, weather permitting.                   MRI R tibia/fibula 10/1/24  No osseous lesion. Moderate diffuse

## 2025-03-12 ENCOUNTER — HOSPITAL ENCOUNTER (OUTPATIENT)
Dept: PHYSICAL THERAPY | Age: 80
Setting detail: THERAPIES SERIES
Discharge: HOME | End: 2025-03-12
Payer: MEDICARE

## 2025-03-12 PROCEDURE — 97110 THERAPEUTIC EXERCISES: CPT

## 2025-03-12 PROCEDURE — 97140 MANUAL THERAPY 1/> REGIONS: CPT

## 2025-03-12 NOTE — FLOWSHEET NOTE
[facility] - Outpatient Rehabilitation and Therapy [address] [place] [address] office: [phone] fax: [phone]           Physical Therapy: TREATMENT/PROGRESS NOTE   Patient: Naseem Gupta (79 y.o. 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   Referring Physician: Perfecto Cao MD  PCP: Jamie Silva MD     Plan of care signed (Y/N):     Date of Patient follow up with Physician: 9/3/2024     Progress Report/POC: YES, Date Range for this report: 10/94909 to 2025  POC update due: (10 visits /OR AUTH LIMITS, whichever is less)  2025                                             Precautions/ Contra-indications:           Latex allergy:  NO  Pacemaker:    NO  Contraindications for Manipulation: None and recent surgical history (relative)  Date of Surgery: 2024  Other:    Red Flags:  None    C-SSRS Triggered by Intake questionnaire:   Patient answered 'NO' to both behavioral questions on intake.  No further screening warranted    Preferred Language for Healthcare:   [x] English       [] other:    SUBJECTIVE EXAMINATION     Patient stated complaint: Patient reports that he wasn't too sore after last session. Reports that he is a little sore in his shins- but doing pretty good overall with walking. Notes that when he is walking he is having more soreness along his L hamstring- which has limited him some. Not sure why this happened. Has been using the percussion massager

## 2025-04-02 ENCOUNTER — HOSPITAL ENCOUNTER (OUTPATIENT)
Dept: PHYSICAL THERAPY | Age: 80
Setting detail: THERAPIES SERIES
Discharge: HOME | End: 2025-04-02
Payer: MEDICARE

## 2025-04-02 PROCEDURE — 97110 THERAPEUTIC EXERCISES: CPT

## 2025-04-02 PROCEDURE — 97140 MANUAL THERAPY 1/> REGIONS: CPT

## 2025-04-02 NOTE — PLAN OF CARE
[facility] - Outpatient Rehabilitation and Therapy [address] [place] [address] office: [phone] fax: [phone]      [facility] [address] [address]  Phone: [phone] Fax: [phone]     Physical Therapy Discharge Summary    Dear Dr Cao  ,    We had the pleasure of treating the following patient for physical therapy services at [facility].  A summary of our findings can be found in the discharge summary below.  If you have any questions or concerns regarding these findings, please do not hesitate to contact me at the office phone number above.  Thank you for the referral.     Physician Signature:________________________________Date:__________________  By signing above (or electronic signature), therapist’s plan is approved by physician      Overall Response to Treatment:   []Patient is responding well to treatment and improvement is noted with regards  to goals   [x]Patient should continue to improve in reasonable time if they continue HEP   []Patient has plateaued and is no longer responding to skilled PT intervention    []Patient is getting worse and would benefit from return to referring MD   []Patient unable to adhere to initial POC   []Other:     Date range of Visits: 2024 thru 2025  Total Visits: 36           Physical Therapy: TREATMENT/PROGRESS NOTE   Patient: Naseem Gupta (79 y.o. 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   Medical Diagnosis: Z96.651 presence of R TKR s/p